# Patient Record
Sex: MALE | Race: WHITE | NOT HISPANIC OR LATINO | Employment: FULL TIME | URBAN - NONMETROPOLITAN AREA
[De-identification: names, ages, dates, MRNs, and addresses within clinical notes are randomized per-mention and may not be internally consistent; named-entity substitution may affect disease eponyms.]

---

## 2018-10-05 ENCOUNTER — HOSPITAL ENCOUNTER (OUTPATIENT)
Dept: ULTRASOUND IMAGING | Facility: HOSPITAL | Age: 48
Discharge: HOME | End: 2018-10-05
Admitting: FAMILY MEDICINE

## 2018-10-05 ENCOUNTER — TRANSCRIBE ORDERS (OUTPATIENT)
Dept: ADMINISTRATIVE | Facility: HOSPITAL | Age: 48
End: 2018-10-05

## 2018-10-05 DIAGNOSIS — R10.11 RUQ PAIN: Primary | ICD-10-CM

## 2018-10-05 DIAGNOSIS — R10.11 RUQ PAIN: ICD-10-CM

## 2018-10-05 PROCEDURE — 76700 US EXAM ABDOM COMPLETE: CPT

## 2019-07-01 ENCOUNTER — HOSPITAL ENCOUNTER (EMERGENCY)
Facility: HOSPITAL | Age: 49
Discharge: HOME | End: 2019-07-01
Attending: EMERGENCY MEDICINE | Admitting: EMERGENCY MEDICINE

## 2019-07-01 ENCOUNTER — APPOINTMENT (OUTPATIENT)
Dept: GENERAL RADIOLOGY | Facility: HOSPITAL | Age: 49
End: 2019-07-01

## 2019-07-01 VITALS
SYSTOLIC BLOOD PRESSURE: 124 MMHG | WEIGHT: 260 LBS | RESPIRATION RATE: 18 BRPM | OXYGEN SATURATION: 95 % | HEIGHT: 74 IN | BODY MASS INDEX: 33.37 KG/M2 | HEART RATE: 54 BPM | DIASTOLIC BLOOD PRESSURE: 81 MMHG | TEMPERATURE: 98.9 F

## 2019-07-01 DIAGNOSIS — R07.9 CHEST PAIN, UNSPECIFIED TYPE: Primary | ICD-10-CM

## 2019-07-01 LAB
ALBUMIN SERPL-MCNC: 4.5 G/DL (ref 3.5–5)
ALBUMIN/GLOB SERPL: 1.6 G/DL (ref 1–2)
ALP SERPL-CCNC: 101 U/L (ref 38–126)
ALT SERPL W P-5'-P-CCNC: 68 U/L (ref 13–69)
ANION GAP SERPL CALCULATED.3IONS-SCNC: 14.6 MMOL/L (ref 10–20)
AST SERPL-CCNC: 38 U/L (ref 15–46)
BASOPHILS # BLD AUTO: 0.06 10*3/MM3 (ref 0–0.2)
BASOPHILS NFR BLD AUTO: 0.7 % (ref 0–1.5)
BILIRUB SERPL-MCNC: 0.6 MG/DL (ref 0.2–1.3)
BUN BLD-MCNC: 9 MG/DL (ref 7–20)
BUN/CREAT SERPL: 9 (ref 6.3–21.9)
CALCIUM SPEC-SCNC: 8.7 MG/DL (ref 8.4–10.2)
CHLORIDE SERPL-SCNC: 99 MMOL/L (ref 98–107)
CO2 SERPL-SCNC: 28 MMOL/L (ref 26–30)
CREAT BLD-MCNC: 1 MG/DL (ref 0.6–1.3)
DEPRECATED RDW RBC AUTO: 42.9 FL (ref 37–54)
EOSINOPHIL # BLD AUTO: 0.17 10*3/MM3 (ref 0–0.4)
EOSINOPHIL NFR BLD AUTO: 1.9 % (ref 0.3–6.2)
ERYTHROCYTE [DISTWIDTH] IN BLOOD BY AUTOMATED COUNT: 12.4 % (ref 12.3–15.4)
GFR SERPL CREATININE-BSD FRML MDRD: 79 ML/MIN/1.73
GLOBULIN UR ELPH-MCNC: 2.8 GM/DL
GLUCOSE BLD-MCNC: 122 MG/DL (ref 74–98)
HCT VFR BLD AUTO: 47.9 % (ref 37.5–51)
HGB BLD-MCNC: 15.7 G/DL (ref 13–17.7)
HOLD SPECIMEN: NORMAL
IMM GRANULOCYTES # BLD AUTO: 0.02 10*3/MM3 (ref 0–0.05)
IMM GRANULOCYTES NFR BLD AUTO: 0.2 % (ref 0–0.5)
LYMPHOCYTES # BLD AUTO: 1.46 10*3/MM3 (ref 0.7–3.1)
LYMPHOCYTES NFR BLD AUTO: 15.9 % (ref 19.6–45.3)
MCH RBC QN AUTO: 31 PG (ref 26.6–33)
MCHC RBC AUTO-ENTMCNC: 32.8 G/DL (ref 31.5–35.7)
MCV RBC AUTO: 94.5 FL (ref 79–97)
MONOCYTES # BLD AUTO: 0.76 10*3/MM3 (ref 0.1–0.9)
MONOCYTES NFR BLD AUTO: 8.3 % (ref 5–12)
NEUTROPHILS # BLD AUTO: 6.69 10*3/MM3 (ref 1.7–7)
NEUTROPHILS NFR BLD AUTO: 73 % (ref 42.7–76)
NRBC BLD AUTO-RTO: 0 /100 WBC (ref 0–0.2)
PLATELET # BLD AUTO: 257 10*3/MM3 (ref 140–450)
PMV BLD AUTO: 9.7 FL (ref 6–12)
POTASSIUM BLD-SCNC: 3.6 MMOL/L (ref 3.5–5.1)
PROT SERPL-MCNC: 7.3 G/DL (ref 6.3–8.2)
RBC # BLD AUTO: 5.07 10*6/MM3 (ref 4.14–5.8)
SODIUM BLD-SCNC: 138 MMOL/L (ref 137–145)
TROPONIN I SERPL-MCNC: 0 NG/ML (ref 0–0.05)
TROPONIN I SERPL-MCNC: <0.012 NG/ML (ref 0–0.03)
TROPONIN I SERPL-MCNC: <0.012 NG/ML (ref 0–0.03)
WBC NRBC COR # BLD: 9.16 10*3/MM3 (ref 3.4–10.8)
WHOLE BLOOD HOLD SPECIMEN: NORMAL
WHOLE BLOOD HOLD SPECIMEN: NORMAL

## 2019-07-01 PROCEDURE — 84484 ASSAY OF TROPONIN QUANT: CPT

## 2019-07-01 PROCEDURE — 84484 ASSAY OF TROPONIN QUANT: CPT | Performed by: EMERGENCY MEDICINE

## 2019-07-01 PROCEDURE — 71045 X-RAY EXAM CHEST 1 VIEW: CPT

## 2019-07-01 PROCEDURE — 99284 EMERGENCY DEPT VISIT MOD MDM: CPT

## 2019-07-01 PROCEDURE — 93005 ELECTROCARDIOGRAM TRACING: CPT

## 2019-07-01 PROCEDURE — 85025 COMPLETE CBC W/AUTO DIFF WBC: CPT

## 2019-07-01 PROCEDURE — 80053 COMPREHEN METABOLIC PANEL: CPT

## 2019-07-01 RX ORDER — SODIUM CHLORIDE 0.9 % (FLUSH) 0.9 %
10 SYRINGE (ML) INJECTION AS NEEDED
Status: DISCONTINUED | OUTPATIENT
Start: 2019-07-01 | End: 2019-07-01 | Stop reason: HOSPADM

## 2019-07-01 RX ORDER — FLUOXETINE HYDROCHLORIDE 40 MG/1
40 CAPSULE ORAL DAILY
COMMUNITY

## 2019-07-01 RX ORDER — ASPIRIN 325 MG
325 TABLET ORAL ONCE
Status: COMPLETED | OUTPATIENT
Start: 2019-07-01 | End: 2019-07-01

## 2019-07-01 RX ORDER — HYDROXYZINE PAMOATE 50 MG/1
50 CAPSULE ORAL 3 TIMES DAILY PRN
Qty: 20 CAPSULE | Refills: 0 | Status: SHIPPED | OUTPATIENT
Start: 2019-07-01

## 2019-07-01 RX ADMIN — ASPIRIN 325 MG ORAL TABLET 325 MG: 325 PILL ORAL at 14:47

## 2019-07-01 NOTE — ED PROVIDER NOTES
"Subjective   49-year-old male presenting with lightheadedness.  He states that 2 hours ago he was at work, began to feel lightheaded, had some mild chest discomfort, tingling in his arms, felt like he may pass out.  He was very stressed at the time.  He feels better at this time.  He denies any nausea, vomiting, shortness of breath.  He notes a history of a \"mild heart attack\", this was about 15 years ago.  10 years ago he had left heart cath that was normal.  He does not smoke.  No family history of coronary disease.  He does note being under a significant amount of stress and thinks that may be the cause of his symptoms.            Review of Systems   Constitutional: Negative.    HENT: Negative.    Eyes: Negative.    Respiratory: Negative.    Cardiovascular: Positive for chest pain. Negative for palpitations.   Gastrointestinal: Negative.    Genitourinary: Negative.    Musculoskeletal: Negative.    Skin: Negative.    Neurological: Positive for light-headedness. Negative for weakness, numbness and headaches.   Psychiatric/Behavioral: Negative.        Past Medical History:   Diagnosis Date   • Myocardial infarction (CMS/[facility])        Allergies   Allergen Reactions   • Codeine Other (See Comments)     headache       History reviewed. No pertinent surgical history.    History reviewed. No pertinent family history.    Social History     Socioeconomic History   • Marital status: Single     Spouse name: Not on file   • Number of children: Not on file   • Years of education: Not on file   • Highest education level: Not on file   Tobacco Use   • Smoking status: Never Smoker   • Smokeless tobacco: Never Used   Substance and Sexual Activity   • Alcohol use: Yes     Comment: occasionally   • Drug use: No           Objective   Physical Exam   Constitutional: He is oriented to person, place, and time. He appears well-developed and well-nourished. No distress.   HENT:   Head: Normocephalic and atraumatic.   Right Ear: External ear " normal.   Left Ear: External ear normal.   Nose: Nose normal.   Mouth/Throat: Oropharynx is clear and moist.   Eyes: Conjunctivae and EOM are normal. Pupils are equal, round, and reactive to light.   Neck: Normal range of motion. Neck supple.   Cardiovascular: Normal rate, regular rhythm, normal heart sounds and intact distal pulses.   Pulmonary/Chest: Effort normal and breath sounds normal. No respiratory distress.   Abdominal: Soft. Bowel sounds are normal. He exhibits no distension. There is no tenderness. There is no rebound and no guarding.   Musculoskeletal: Normal range of motion. He exhibits no edema, tenderness or deformity.   Neurological: He is alert and oriented to person, place, and time.   Skin: Skin is warm and dry. No rash noted.   Psychiatric: He has a normal mood and affect. His behavior is normal.   Nursing note and vitals reviewed.      Procedures           ED Course                  MDM  Number of Diagnoses or Management Options  Chest pain, unspecified type:   Diagnosis management comments: 49-year-old male with lightheadedness, chest pain.  Well-developed, well-nourished man in no distress with exam as above.  He is normal vital signs and a largely unremarkable exam.  Will check labs, EKG and chest x-ray.  Will give aspirin.  Disposition pending work-up.    DDX: Anxiety, ACS, musculoskeletal pain    EKG interpreted by me: Sinus rhythm, normal rate, no acute ST/T changes, poor R wave progression, this is an abnormal EKG, no previous for comparison    Lab work is unremarkable, serial enzymes are negative.  Will discharge home with outpatient follow-up.       Amount and/or Complexity of Data Reviewed  Clinical lab tests: reviewed  Tests in the radiology section of CPT®: reviewed          Final diagnoses:   Chest pain, unspecified type            Marcos Pollack MD  07/01/19 2254

## 2019-08-07 ENCOUNTER — TELEPHONE (OUTPATIENT)
Dept: SURGERY | Facility: CLINIC | Age: 49
End: 2019-08-07

## 2019-08-07 NOTE — TELEPHONE ENCOUNTER
PRESCREENING FOR OPEN ACCESS SCHEDULING    Yamil Solitario, 1970  [id number]    08/07/19    If, the patient answers yes to any of the following questions the provider will be informed prior to scheduling open access for approval and documented in the chart.    [x]  Yes  [x] No    1. Have you ever had a colonoscopy in the past?      When:        Where:       Polyps or other:     [x]  Yes  [] No    2. Family history of colon cancer?      Relation: UNCLE    Age of onset:       Do you currently have any of the following?    []  Yes  [x] No  Rectal bleeding, if so, how long?     []  Yes  [x] No  Abdominal pain, if so, how long?    []  Yes  [x] No  Constipation, if so, how long?    []  Yes  [x] No  Diarrhea, if so, how long?    []  Yes  [x] No  Weight loss, is so, how much?    [] Yes  [x] No  Small caliber stool, if so, how long?      Have you ever had any of the following conditions?    [] Yes  [x] No  Heart attack?      When?       Last cardiac workup?     Blood thinners?    [] Yes  [x] No   Lung problems, asthma or COPD?  [] Yes  [x] No  Oxygen required?       [] Yes  [x] No  Stroke?     [] Yes  [x] No  Have you ever had a reaction to anesthesia?      BH 09/13/19  COLONOSCOPY

## 2019-08-16 ENCOUNTER — PREP FOR SURGERY (OUTPATIENT)
Dept: OTHER | Facility: HOSPITAL | Age: 49
End: 2019-08-16

## 2019-08-16 DIAGNOSIS — Z12.11 COLON CANCER SCREENING: Primary | ICD-10-CM

## 2019-08-20 PROBLEM — Z12.11 COLON CANCER SCREENING: Status: ACTIVE | Noted: 2019-08-20

## 2019-09-03 ENCOUNTER — TELEPHONE (OUTPATIENT)
Dept: SURGERY | Facility: CLINIC | Age: 49
End: 2019-09-03

## 2019-11-07 ENCOUNTER — TELEPHONE (OUTPATIENT)
Dept: SURGERY | Facility: CLINIC | Age: 49
End: 2019-11-07

## 2019-11-07 NOTE — TELEPHONE ENCOUNTER
Patient called saying he needs to cancel his colonoscopy that is scheduled 11/15/19 with Dr Gupta. Stated he will call back and reschedule at a later date.

## 2022-02-11 ENCOUNTER — APPOINTMENT (OUTPATIENT)
Dept: GENERAL RADIOLOGY | Facility: HOSPITAL | Age: 52
End: 2022-02-11

## 2022-02-11 ENCOUNTER — HOSPITAL ENCOUNTER (EMERGENCY)
Facility: HOSPITAL | Age: 52
Discharge: HOME | End: 2022-02-11
Attending: EMERGENCY MEDICINE | Admitting: EMERGENCY MEDICINE

## 2022-02-11 VITALS
OXYGEN SATURATION: 98 % | HEIGHT: 74 IN | BODY MASS INDEX: 35.29 KG/M2 | RESPIRATION RATE: 16 BRPM | DIASTOLIC BLOOD PRESSURE: 76 MMHG | SYSTOLIC BLOOD PRESSURE: 133 MMHG | HEART RATE: 60 BPM | WEIGHT: 275 LBS | TEMPERATURE: 98 F

## 2022-02-11 DIAGNOSIS — Z86.79 HISTORY OF HYPERTENSION: ICD-10-CM

## 2022-02-11 DIAGNOSIS — Z86.39 HISTORY OF DIABETES MELLITUS, TYPE II: ICD-10-CM

## 2022-02-11 DIAGNOSIS — R07.9 CHEST PAIN, UNSPECIFIED TYPE: Primary | ICD-10-CM

## 2022-02-11 LAB
ALBUMIN SERPL-MCNC: 4.3 G/DL (ref 3.5–5.2)
ALBUMIN/GLOB SERPL: 1.8 G/DL
ALP SERPL-CCNC: 89 U/L (ref 39–117)
ALT SERPL W P-5'-P-CCNC: 56 U/L (ref 1–41)
ANION GAP SERPL CALCULATED.3IONS-SCNC: 11.4 MMOL/L (ref 5–15)
AST SERPL-CCNC: 40 U/L (ref 1–40)
BASOPHILS # BLD AUTO: 0.03 10*3/MM3 (ref 0–0.2)
BASOPHILS NFR BLD AUTO: 0.3 % (ref 0–1.5)
BILIRUB SERPL-MCNC: 0.3 MG/DL (ref 0–1.2)
BUN SERPL-MCNC: 8 MG/DL (ref 6–20)
BUN/CREAT SERPL: 7 (ref 7–25)
CALCIUM SPEC-SCNC: 8.9 MG/DL (ref 8.6–10.5)
CHLORIDE SERPL-SCNC: 103 MMOL/L (ref 98–107)
CO2 SERPL-SCNC: 23.6 MMOL/L (ref 22–29)
CREAT SERPL-MCNC: 1.15 MG/DL (ref 0.76–1.27)
D DIMER PPP FEU-MCNC: 0.2 MCGFEU/ML (ref 0–0.57)
DEPRECATED RDW RBC AUTO: 44.7 FL (ref 37–54)
EOSINOPHIL # BLD AUTO: 0.1 10*3/MM3 (ref 0–0.4)
EOSINOPHIL NFR BLD AUTO: 1.1 % (ref 0.3–6.2)
ERYTHROCYTE [DISTWIDTH] IN BLOOD BY AUTOMATED COUNT: 13.2 % (ref 12.3–15.4)
GFR SERPL CREATININE-BSD FRML MDRD: 67 ML/MIN/1.73
GLOBULIN UR ELPH-MCNC: 2.4 GM/DL
GLUCOSE SERPL-MCNC: 125 MG/DL (ref 65–99)
HCT VFR BLD AUTO: 44.9 % (ref 37.5–51)
HGB BLD-MCNC: 15.1 G/DL (ref 13–17.7)
HOLD SPECIMEN: NORMAL
HOLD SPECIMEN: NORMAL
IMM GRANULOCYTES # BLD AUTO: 0.03 10*3/MM3 (ref 0–0.05)
IMM GRANULOCYTES NFR BLD AUTO: 0.3 % (ref 0–0.5)
LYMPHOCYTES # BLD AUTO: 1.63 10*3/MM3 (ref 0.7–3.1)
LYMPHOCYTES NFR BLD AUTO: 18.5 % (ref 19.6–45.3)
MCH RBC QN AUTO: 31.3 PG (ref 26.6–33)
MCHC RBC AUTO-ENTMCNC: 33.6 G/DL (ref 31.5–35.7)
MCV RBC AUTO: 93.2 FL (ref 79–97)
MONOCYTES # BLD AUTO: 0.73 10*3/MM3 (ref 0.1–0.9)
MONOCYTES NFR BLD AUTO: 8.3 % (ref 5–12)
NEUTROPHILS NFR BLD AUTO: 6.3 10*3/MM3 (ref 1.7–7)
NEUTROPHILS NFR BLD AUTO: 71.5 % (ref 42.7–76)
NRBC BLD AUTO-RTO: 0 /100 WBC (ref 0–0.2)
NT-PROBNP SERPL-MCNC: 20.6 PG/ML (ref 0–900)
PLATELET # BLD AUTO: 210 10*3/MM3 (ref 140–450)
PMV BLD AUTO: 10 FL (ref 6–12)
POTASSIUM SERPL-SCNC: 3.7 MMOL/L (ref 3.5–5.2)
PROT SERPL-MCNC: 6.7 G/DL (ref 6–8.5)
RBC # BLD AUTO: 4.82 10*6/MM3 (ref 4.14–5.8)
SODIUM SERPL-SCNC: 138 MMOL/L (ref 136–145)
TROPONIN T SERPL-MCNC: <0.01 NG/ML (ref 0–0.03)
TROPONIN T SERPL-MCNC: <0.01 NG/ML (ref 0–0.03)
WBC NRBC COR # BLD: 8.82 10*3/MM3 (ref 3.4–10.8)
WHOLE BLOOD HOLD SPECIMEN: NORMAL
WHOLE BLOOD HOLD SPECIMEN: NORMAL

## 2022-02-11 PROCEDURE — 83880 ASSAY OF NATRIURETIC PEPTIDE: CPT | Performed by: NURSE PRACTITIONER

## 2022-02-11 PROCEDURE — 84484 ASSAY OF TROPONIN QUANT: CPT | Performed by: NURSE PRACTITIONER

## 2022-02-11 PROCEDURE — 93005 ELECTROCARDIOGRAM TRACING: CPT | Performed by: EMERGENCY MEDICINE

## 2022-02-11 PROCEDURE — 71045 X-RAY EXAM CHEST 1 VIEW: CPT

## 2022-02-11 PROCEDURE — 99283 EMERGENCY DEPT VISIT LOW MDM: CPT

## 2022-02-11 PROCEDURE — 36415 COLL VENOUS BLD VENIPUNCTURE: CPT

## 2022-02-11 PROCEDURE — 85025 COMPLETE CBC W/AUTO DIFF WBC: CPT | Performed by: EMERGENCY MEDICINE

## 2022-02-11 PROCEDURE — 84484 ASSAY OF TROPONIN QUANT: CPT | Performed by: EMERGENCY MEDICINE

## 2022-02-11 PROCEDURE — 80053 COMPREHEN METABOLIC PANEL: CPT | Performed by: EMERGENCY MEDICINE

## 2022-02-11 PROCEDURE — 85379 FIBRIN DEGRADATION QUANT: CPT | Performed by: NURSE PRACTITIONER

## 2022-02-11 RX ORDER — SODIUM CHLORIDE 0.9 % (FLUSH) 0.9 %
10 SYRINGE (ML) INJECTION AS NEEDED
Status: DISCONTINUED | OUTPATIENT
Start: 2022-02-11 | End: 2022-02-11 | Stop reason: HOSPADM

## 2022-02-11 NOTE — ED PROVIDER NOTES
EMERGENCY DEPARTMENT ENCOUNTER    Pt Name: Dwayne Denver Short  MRN: [id number]  Pt :   1970  Room Number:    Date of encounter:  2022  PCP: Taniya Moise DO  ED Provider: AGUSTIN Manning    Historian: patient    HPI:  Chief Complaint: chest pain        Context: Dwayne Denver Short is a 51 y.o. male who presents to the ED c/o chest pain intermittently for 1 week and today, was associated with a brief pain in his right jaw.  Mr. Colunga states that he had a myocardial infarction 15 years ago.  He had a heart cath that was normal, to his understanding, about 8 years ago.  Today's pain is currently absent.  He states his highest pain today has been rated a 3 out of 10.  He experienced a moment of sweating without shortness of breath or nausea or syncope.  He can recount no variables that made his pain better nor worse.  His pain is not reproduced with cough or deep breathing.    He has a past medical history of hypertension without history of hyperlipidemia.  He is a type II diabetic who is a non-smoker.  No history of CVA.    Review of systems is negative for fever or chills or recent illness.  Negative for cough or shortness of breath or exertional dyspnea.  GI and  systems are negative.  No profound weakness, dizziness or syncope.  No neurosensory complaints or focal weakness      PAST MEDICAL HISTORY  Past Medical History:   Diagnosis Date   • Myocardial infarction (CMS/HCC)          PAST SURGICAL HISTORY  No past surgical history on file.      FAMILY HISTORY  No family history on file.      SOCIAL HISTORY  Social History     Socioeconomic History   • Marital status: Single   Tobacco Use   • Smoking status: Never Smoker   • Smokeless tobacco: Never Used   Substance and Sexual Activity   • Alcohol use: Yes     Comment: occasionally   • Drug use: No         ALLERGIES  Codeine        REVIEW OF SYSTEMS  Review of Systems     All systems reviewed and negative except for those discussed  Family notified: Dr. Mena to update pt's family in room.   in HPI.       PHYSICAL EXAM    I have reviewed the triage vital signs and nursing notes.    ED Triage Vitals [02/11/22 1515]   Temp Heart Rate Resp BP SpO2   98 °F (36.7 °C) 75 20 148/79 97 %      Temp src Heart Rate Source Patient Position BP Location FiO2 (%)   Oral Monitor Sitting Left arm --       Physical Exam  GENERAL:   Appears in no acute distress.  He is chest pain-free.  His vital signs are normal  HENT: Nares patent.  EYES: No scleral icterus.  CV: Regular rhythm, regular rate.  No murmur.  No peripheral edema.  RESPIRATORY: Normal effort.  No audible wheezes, rales or rhonchi.  ABDOMEN: Soft, nontender  MUSCULOSKELETAL: No deformities.   NEURO: Alert, moves all extremities, follows commands.  SKIN: Warm, dry, no rash visualized.        LAB RESULTS  No results found for this or any previous visit (from the past 24 hour(s)).    If labs were ordered, I independently reviewed the results.        RADIOLOGY  No Radiology Exams Resulted Within Past 24 Hours        PROCEDURES    Procedures    ECG 12 Lead    (Results Pending)       MEDICATIONS GIVEN IN ER    Medications - No data to display        ED Course as of 02/21/22 0739   Fri Feb 11, 2022   1536 EKG interpreted by me: Sinus rhythm, normal rate, right bundle branch block, no acute ST/T changes, this is an abnormal EKG [MP]   0808 I have conferred with Dr. Sood.  Patient has a heart score of 5.  He has had no chest pain throughout his ED course.  His vital signs have been normal.  He has had 2 normal troponin and normal D-dimer.  Dr. Sood requests him call the office on Monday for follow-up next week.  Mr. Solitario and I discussed in detail parameters for concern that would warrant return to the emergency department.  Patient understands and concurs with his outpatient plan of care and close follow-up.   [MS]      ED Course User Index  [MP] Marcos Pollack MD  [MS] Valentine Mullen, AGUSTIN             AS OF 07:39 EST VITALS:    BP - 133/76  HR -  60  TEMP - 98 °F (36.7 °C) (Oral)  O2 SATS - 98%                  DIAGNOSIS  Final diagnoses:   Chest pain, unspecified type   History of hypertension   History of diabetes mellitus, type II         DISPOSITION    DISCHARGE    Patient discharged in stable condition.    Reviewed implications of results, diagnosis, meds, responsibility to follow up, warning signs and symptoms of possible worsening, potential complications and reasons to return to ER.    Patient/Family voiced understanding of above instructions.    Discussed plan for discharge, as there is no emergent indication for admission.  Pt/family is agreeable and understands need for follow up and possible repeat testing.  Pt/family is aware that discharge does not mean that nothing is wrong but that it indicates no emergency is currently present that requires admission and they must continue care with follow-up as given below or with a physician of their choice.     FOLLOW-UP  Kyle Sood MD  [address] [address] [phone]               Medication List      No changes were made to your prescriptions during this visit.                  Valentine Mullen APRN  02/11/22 1949       Valentine Mullen APRN  02/21/22 0721

## 2022-02-11 NOTE — DISCHARGE INSTRUCTIONS
Home to rest.  Maintain your very best hydration and nutrition.  Continue daily aspirin.  Call the office of Dr. Sood on Monday.  He is expecting you and wants to see you next week.  In the meantime, return to the emergency department as needed for worsening symptoms or concerns.  Thank you

## 2022-07-23 ENCOUNTER — HOSPITAL ENCOUNTER (EMERGENCY)
Facility: HOSPITAL | Age: 52
Discharge: HOME | End: 2022-07-24
Attending: EMERGENCY MEDICINE | Admitting: EMERGENCY MEDICINE

## 2022-07-23 ENCOUNTER — APPOINTMENT (OUTPATIENT)
Dept: CT IMAGING | Facility: HOSPITAL | Age: 52
End: 2022-07-23

## 2022-07-23 VITALS
WEIGHT: 278.9 LBS | BODY MASS INDEX: 35.79 KG/M2 | TEMPERATURE: 99 F | RESPIRATION RATE: 18 BRPM | HEART RATE: 90 BPM | DIASTOLIC BLOOD PRESSURE: 78 MMHG | HEIGHT: 74 IN | SYSTOLIC BLOOD PRESSURE: 138 MMHG | OXYGEN SATURATION: 96 %

## 2022-07-23 DIAGNOSIS — Z12.11 COLON CANCER SCREENING: Primary | ICD-10-CM

## 2022-07-23 LAB
ALBUMIN SERPL-MCNC: 4.3 G/DL (ref 3.5–5.2)
ALBUMIN/GLOB SERPL: 2 G/DL
ALP SERPL-CCNC: 76 U/L (ref 39–117)
ALT SERPL W P-5'-P-CCNC: 36 U/L (ref 1–41)
ANION GAP SERPL CALCULATED.3IONS-SCNC: 12.5 MMOL/L (ref 5–15)
AST SERPL-CCNC: 18 U/L (ref 1–40)
BACTERIA UR QL AUTO: ABNORMAL /HPF
BASOPHILS # BLD AUTO: 0.05 10*3/MM3 (ref 0–0.2)
BASOPHILS NFR BLD AUTO: 0.4 % (ref 0–1.5)
BILIRUB SERPL-MCNC: 0.5 MG/DL (ref 0–1.2)
BILIRUB UR QL STRIP: NEGATIVE
BUN SERPL-MCNC: 9 MG/DL (ref 6–20)
BUN/CREAT SERPL: 8.5 (ref 7–25)
CALCIUM SPEC-SCNC: 9.2 MG/DL (ref 8.6–10.5)
CHLORIDE SERPL-SCNC: 99 MMOL/L (ref 98–107)
CLARITY UR: CLEAR
CO2 SERPL-SCNC: 24.5 MMOL/L (ref 22–29)
COLOR UR: YELLOW
CREAT SERPL-MCNC: 1.06 MG/DL (ref 0.76–1.27)
DEPRECATED RDW RBC AUTO: 40.4 FL (ref 37–54)
EGFRCR SERPLBLD CKD-EPI 2021: 83.9 ML/MIN/1.73
EOSINOPHIL # BLD AUTO: 0.13 10*3/MM3 (ref 0–0.4)
EOSINOPHIL NFR BLD AUTO: 0.9 % (ref 0.3–6.2)
ERYTHROCYTE [DISTWIDTH] IN BLOOD BY AUTOMATED COUNT: 12.3 % (ref 12.3–15.4)
GLOBULIN UR ELPH-MCNC: 2.1 GM/DL
GLUCOSE SERPL-MCNC: 155 MG/DL (ref 65–99)
GLUCOSE UR STRIP-MCNC: NEGATIVE MG/DL
HCT VFR BLD AUTO: 45.8 % (ref 37.5–51)
HGB BLD-MCNC: 15.7 G/DL (ref 13–17.7)
HGB UR QL STRIP.AUTO: NEGATIVE
HOLD SPECIMEN: NORMAL
HOLD SPECIMEN: NORMAL
HYALINE CASTS UR QL AUTO: ABNORMAL /LPF
IMM GRANULOCYTES # BLD AUTO: 0.04 10*3/MM3 (ref 0–0.05)
IMM GRANULOCYTES NFR BLD AUTO: 0.3 % (ref 0–0.5)
KETONES UR QL STRIP: NEGATIVE
LEUKOCYTE ESTERASE UR QL STRIP.AUTO: ABNORMAL
LIPASE SERPL-CCNC: 14 U/L (ref 13–60)
LYMPHOCYTES # BLD AUTO: 2.02 10*3/MM3 (ref 0.7–3.1)
LYMPHOCYTES NFR BLD AUTO: 14.7 % (ref 19.6–45.3)
MCH RBC QN AUTO: 30.9 PG (ref 26.6–33)
MCHC RBC AUTO-ENTMCNC: 34.3 G/DL (ref 31.5–35.7)
MCV RBC AUTO: 90.2 FL (ref 79–97)
MONOCYTES # BLD AUTO: 1.13 10*3/MM3 (ref 0.1–0.9)
MONOCYTES NFR BLD AUTO: 8.2 % (ref 5–12)
MUCOUS THREADS URNS QL MICRO: ABNORMAL /HPF
NEUTROPHILS NFR BLD AUTO: 10.36 10*3/MM3 (ref 1.7–7)
NEUTROPHILS NFR BLD AUTO: 75.5 % (ref 42.7–76)
NITRITE UR QL STRIP: NEGATIVE
NRBC BLD AUTO-RTO: 0 /100 WBC (ref 0–0.2)
PH UR STRIP.AUTO: 5.5 [PH] (ref 5–8)
PLATELET # BLD AUTO: 226 10*3/MM3 (ref 140–450)
PMV BLD AUTO: 9.8 FL (ref 6–12)
POTASSIUM SERPL-SCNC: 3.8 MMOL/L (ref 3.5–5.2)
PROT SERPL-MCNC: 6.4 G/DL (ref 6–8.5)
PROT UR QL STRIP: NEGATIVE
RBC # BLD AUTO: 5.08 10*6/MM3 (ref 4.14–5.8)
RBC # UR STRIP: ABNORMAL /HPF
REF LAB TEST METHOD: ABNORMAL
SODIUM SERPL-SCNC: 136 MMOL/L (ref 136–145)
SP GR UR STRIP: 1.01 (ref 1–1.03)
SQUAMOUS #/AREA URNS HPF: ABNORMAL /HPF
UROBILINOGEN UR QL STRIP: ABNORMAL
WBC # UR STRIP: ABNORMAL /HPF
WBC NRBC COR # BLD: 13.73 10*3/MM3 (ref 3.4–10.8)
WHOLE BLOOD HOLD COAG: NORMAL
WHOLE BLOOD HOLD SPECIMEN: NORMAL

## 2022-07-23 PROCEDURE — 80053 COMPREHEN METABOLIC PANEL: CPT | Performed by: EMERGENCY MEDICINE

## 2022-07-23 PROCEDURE — 83690 ASSAY OF LIPASE: CPT | Performed by: EMERGENCY MEDICINE

## 2022-07-23 PROCEDURE — 85025 COMPLETE CBC W/AUTO DIFF WBC: CPT | Performed by: EMERGENCY MEDICINE

## 2022-07-23 PROCEDURE — 74176 CT ABD & PELVIS W/O CONTRAST: CPT

## 2022-07-23 PROCEDURE — 81001 URINALYSIS AUTO W/SCOPE: CPT | Performed by: EMERGENCY MEDICINE

## 2022-07-23 PROCEDURE — 99283 EMERGENCY DEPT VISIT LOW MDM: CPT

## 2022-07-23 RX ORDER — SODIUM CHLORIDE 0.9 % (FLUSH) 0.9 %
10 SYRINGE (ML) INJECTION AS NEEDED
Status: DISCONTINUED | OUTPATIENT
Start: 2022-07-23 | End: 2022-07-24 | Stop reason: HOSPADM

## 2022-07-24 PROCEDURE — 25010000002 KETOROLAC TROMETHAMINE PER 15 MG: Performed by: EMERGENCY MEDICINE

## 2022-07-24 PROCEDURE — 96374 THER/PROPH/DIAG INJ IV PUSH: CPT

## 2022-07-24 RX ORDER — AMOXICILLIN AND CLAVULANATE POTASSIUM 875; 125 MG/1; MG/1
1 TABLET, FILM COATED ORAL ONCE
Status: COMPLETED | OUTPATIENT
Start: 2022-07-24 | End: 2022-07-24

## 2022-07-24 RX ORDER — TRAMADOL HYDROCHLORIDE 50 MG/1
50 TABLET ORAL EVERY 6 HOURS PRN
Qty: 12 TABLET | Refills: 0 | Status: SHIPPED | OUTPATIENT
Start: 2022-07-24

## 2022-07-24 RX ORDER — AMOXICILLIN AND CLAVULANATE POTASSIUM 875; 125 MG/1; MG/1
1 TABLET, FILM COATED ORAL 2 TIMES DAILY
Qty: 14 TABLET | Refills: 0 | Status: SHIPPED | OUTPATIENT
Start: 2022-07-24

## 2022-07-24 RX ORDER — DICYCLOMINE HYDROCHLORIDE 10 MG/1
20 CAPSULE ORAL ONCE
Status: COMPLETED | OUTPATIENT
Start: 2022-07-24 | End: 2022-07-24

## 2022-07-24 RX ORDER — KETOROLAC TROMETHAMINE 30 MG/ML
30 INJECTION, SOLUTION INTRAMUSCULAR; INTRAVENOUS ONCE
Status: COMPLETED | OUTPATIENT
Start: 2022-07-24 | End: 2022-07-24

## 2022-07-24 RX ADMIN — KETOROLAC TROMETHAMINE 30 MG: 30 INJECTION, SOLUTION INTRAMUSCULAR; INTRAVENOUS at 00:26

## 2022-07-24 RX ADMIN — DICYCLOMINE HYDROCHLORIDE 20 MG: 10 CAPSULE ORAL at 00:26

## 2022-07-24 RX ADMIN — AMOXICILLIN AND CLAVULANATE POTASSIUM 1 TABLET: 875; 125 TABLET, FILM COATED ORAL at 00:27

## 2022-07-24 NOTE — ED PROVIDER NOTES
"Subjective   History of Present Illness    Chief Complaint: Abdominal pain  History of Present Illness: 52-year-old male presents with left lower quadrant abdominal pain radiating into his groin.  And gradually over the last day or 2.  Worse tonight.  No history of same.  No GI bleeding no mucus in the stools, no diarrhea no travel  Onset: See above timeline  Duration: Persistent  Exacerbating / Alleviating factors: None  Associated symptoms: None      Nurses Notes reviewed and agree, including vitals, allergies, social history and prior medical history.     REVIEW OF SYSTEMS: All systems reviewed and not pertinent unless noted.    Positive for: Left lower quadrant abdominal pain    Negative for: Fever chills vomiting diarrhea GI bleeding constipation hematuria  Review of Systems    Past Medical History:   Diagnosis Date   • Myocardial infarction (HCC)        Allergies   Allergen Reactions   • Codeine Other (See Comments)     headache       History reviewed. No pertinent surgical history.    History reviewed. No pertinent family history.    Social History     Socioeconomic History   • Marital status: Single   Tobacco Use   • Smoking status: Never Smoker   • Smokeless tobacco: Never Used   Substance and Sexual Activity   • Alcohol use: Yes     Comment: occasionally   • Drug use: No           Objective   Physical Exam  /78   Pulse 90   Temp 99 °F (37.2 °C) (Oral)   Resp 18   Ht 188 cm (74\")   Wt 127 kg (278 lb 14.4 oz)   SpO2 96%   BMI 35.81 kg/m²     CONSTITUTIONAL: Well developed, nontoxic healthy-appearing 52-year-old  male,  in no acute distress.  VITAL SIGNS: per nursing, reviewed and noted  SKIN: exposed skin with no rashes, ulcerations or petechiae  EYES: Grossly EOMI, no icterus  ENT: Normal voice.  Patient maintained wearing a mask throughout patient encounter due to coronavirus pandemic  RESPIRATORY:  No increased work of breathing. No retractions.   CARDIOVASCULAR:  regular rate and " rhythm, no murmurs.  Good Peripheral pulses. Good cap refill to extremities.   GI: Abdomen soft, reproducible left lower quadrants palpation without rebound tenderness or guarding, normal bowel sounds. No hernia. No ascites.  MUSCULOSKELETAL:  No tenderness. Full ROM. Strength and tone grossly normal.  no spasms. no neck or back tenderness or spasm.   NEUROLOGIC: Alert, oriented x 3. No gross deficits. GCS 15.   PSYCH: appropriate affect.  : no bladder tenderness or distention, no CVA tenderness      Procedures     No attending physician procedures were performed on this patient.    ED Course      Lab Results (last 24 hours)     Procedure Component Value Units Date/Time    CBC & Differential [[id number]]  (Abnormal) Collected: 07/23/22 2144    Specimen: Blood Updated: 07/23/22 2150    Narrative:      The following orders were created for panel order CBC & Differential.  Procedure                               Abnormality         Status                     ---------                               -----------         ------                     CBC Auto Differential[[id number]]        Abnormal            Final result                 Please view results for these tests on the individual orders.    Comprehensive Metabolic Panel [[id number]]  (Abnormal) Collected: 07/23/22 2144    Specimen: Blood Updated: 07/23/22 2205     Glucose 155 mg/dL      BUN 9 mg/dL      Creatinine 1.06 mg/dL      Sodium 136 mmol/L      Potassium 3.8 mmol/L      Chloride 99 mmol/L      CO2 24.5 mmol/L      Calcium 9.2 mg/dL      Total Protein 6.4 g/dL      Albumin 4.30 g/dL      ALT (SGPT) 36 U/L      AST (SGOT) 18 U/L      Alkaline Phosphatase 76 U/L      Total Bilirubin 0.5 mg/dL      Globulin 2.1 gm/dL      A/G Ratio 2.0 g/dL      BUN/Creatinine Ratio 8.5     Anion Gap 12.5 mmol/L      eGFR 83.9 mL/min/1.73      Comment: National Kidney Foundation and American Society of Nephrology (ASN) Task Force recommended calculation based on the Chronic  Kidney Disease Epidemiology Collaboration (CKD-EPI) equation refit without adjustment for race.       Narrative:      GFR Normal >60  Chronic Kidney Disease <60  Kidney Failure <15      Lipase [[id number]]  (Normal) Collected: 07/23/22 2144    Specimen: Blood Updated: 07/23/22 2205     Lipase 14 U/L     CBC Auto Differential [[id number]]  (Abnormal) Collected: 07/23/22 2144    Specimen: Blood Updated: 07/23/22 2150     WBC 13.73 10*3/mm3      RBC 5.08 10*6/mm3      Hemoglobin 15.7 g/dL      Hematocrit 45.8 %      MCV 90.2 fL      MCH 30.9 pg      MCHC 34.3 g/dL      RDW 12.3 %      RDW-SD 40.4 fl      MPV 9.8 fL      Platelets 226 10*3/mm3      Neutrophil % 75.5 %      Lymphocyte % 14.7 %      Monocyte % 8.2 %      Eosinophil % 0.9 %      Basophil % 0.4 %      Immature Grans % 0.3 %      Neutrophils, Absolute 10.36 10*3/mm3      Lymphocytes, Absolute 2.02 10*3/mm3      Monocytes, Absolute 1.13 10*3/mm3      Eosinophils, Absolute 0.13 10*3/mm3      Basophils, Absolute 0.05 10*3/mm3      Immature Grans, Absolute 0.04 10*3/mm3      nRBC 0.0 /100 WBC     Urinalysis With Microscopic If Indicated (No Culture) - Urine, Clean Catch [[id number]]  (Abnormal) Collected: 07/23/22 2145    Specimen: Urine, Clean Catch Updated: 07/23/22 2203     Color, UA Yellow     Appearance, UA Clear     pH, UA 5.5     Specific Gravity, UA 1.012     Glucose, UA Negative     Ketones, UA Negative     Bilirubin, UA Negative     Blood, UA Negative     Protein, UA Negative     Leuk Esterase, UA Trace     Nitrite, UA Negative     Urobilinogen, UA 0.2 E.U./dL    Urinalysis, Microscopic Only - Urine, Clean Catch [[id number]]  (Abnormal) Collected: 07/23/22 2145    Specimen: Urine, Clean Catch Updated: 07/23/22 2203     RBC, UA None Seen /HPF      WBC, UA 0-2 /HPF      Bacteria, UA Trace /HPF      Squamous Epithelial Cells, UA 0-2 /HPF      Hyaline Casts, UA None Seen /LPF      Mucus, UA Trace /HPF      Methodology Manual Light Microscopy        CT Abdomen  Pelvis Without Contrast    Result Date: 7/23/2022  FINAL REPORT TECHNIQUE: Axial CT images were performed from the lung bases through the symphysis pubis without IV contrast.  This study was performed with techniques to keep radiation doses as low as reasonably achievable (ALARA). Individualized dose reduction techniques using automated exposure control or adjustment of mA and/or kV according to the patient's size were employed. CLINICAL HISTORY: LLQ pain FINDINGS: Lack of intravenous contrast limits evaluation of solid abdominal and pelvic organs.  LOWER CHEST: The heart is normal size.  The lung bases are clear.  ABDOMEN/PELVIS:  Liver, gallbladder and bile ducts: There is hepatic steatosis.  The gallbladder is unremarkable.  There is no definite biliary duct dilatation.  Adrenal glands: The adrenal glands are morphologically unremarkable without suspicious lesion. Kidneys, ureter and urinary bladder: No nephrolithiasis.  No hydronephrosis.  Urinary bladder is unremarkable.  Spleen: The spleen is normal size.  Pancreas: The pancreas is grossly unremarkable.  GI systems and mesentery: No evidence of bowel obstruction.  The appendix is visualized and unremarkable in appearance.  No significant mesenteric inflammation.  There is colonic diverticulosis with focal wall thickening and pericolonic inflammation involving the sigmoid colon.  Lymph nodes: No definite pathologically enlarged abdominal or pelvic lymph nodes present within the limits of this noncontrast enhanced exam.  Vessels: The abdominal aorta is normal in caliber.  The inferior vena cava is unremarkable.  Peritoneum: No free intraperitoneal fluid or pneumoperitoneum.  Pelvic viscera: No acute findings.  Body wall: No body wall contusion. Bones: No acute fracture.     Impression: Findings are compatible with acute uncomplicated sigmoid diverticulitis. Authenticated and Electronically Signed by Mateus Joseph MD on 07/23/2022 10:52:59 PM                                  ALBA reviewed by Tee Peoples,        MDM  52-year-old male presented with left lower quadrant pain.  Work-up consistent with acute uncomplicated sigmoid diverticulitis per CT results.  Hemodynamically stable.  Nontoxic.  Will discharge with Augmentin, tramadol, advised dietary restrictions, supportive care, outpatient follow return precaution discussed  Final diagnoses:   Colon cancer screening       ED Disposition  ED Disposition     ED Disposition   Discharge    Condition   Stable    Comment   --             Taniya Moise DO  [address] [facility] [id number] [phone]          [facility] Emergency Department  [address] [facility] [address] [phone]    As needed, If symptoms worsen         Medication List      New Prescriptions    amoxicillin-clavulanate 875-125 MG per tablet  Commonly known as: AUGMENTIN  Take 1 tablet by mouth 2 (Two) Times a Day.     traMADol 50 MG tablet  Commonly known as: ULTRAM  Take 1 tablet by mouth Every 6 (Six) Hours As Needed for Severe Pain .           Where to Get Your Medications      These medications were sent to [facility] [address] [place] [address] - [address] [facility] - [phone]  - [phone]   [address] [facility] [address]    Phone: [phone]   · amoxicillin-clavulanate 875-125 MG per tablet  · traMADol 50 MG tablet          Tee Peoples DO  07/24/22 0022

## 2022-11-28 NOTE — PROGRESS NOTES
Patient: Dwayne Denver Short    YOB: 1970    Date: 11/30/2022    Primary Care Provider: Taniya Moise DO    Chief Complaint   Patient presents with   • Colonoscopy     evaluation       SUBJECTIVE:    History of present illness:  I saw the patient in the office today as a consultation for evaluation and treatment of abdominal pain, diarrhea, and bloating.  He went to the ER in July and had a ct scan that showed acute uncomplicated sigmoid diverticulitis.  He has recently seen his PCP and has been treated with Flagyl and Cipro.  He states that he has ongoing digestive discomfort.  Patient developed a second episode recently, placed on antibiotics again.  He has been on probiotic Greek yogurt daily, has considerable diarrhea and bloating upper abdominal pain radiating to shoulder.    The following portions of the patient's history were reviewed and updated as appropriate: allergies, current medications, past family history, past medical history, past social history, past surgical history and problem list.    Review of Systems   Constitutional: Negative for appetite change, chills, diaphoresis, fatigue, fever and unexpected weight change.   HENT: Negative for congestion, dental problem, drooling, ear discharge, hearing loss, nosebleeds, postnasal drip, trouble swallowing and voice change.    Eyes: Negative for photophobia and visual disturbance.   Respiratory: Negative for apnea, cough, choking, chest tightness, shortness of breath and wheezing.    Cardiovascular: Negative for chest pain, palpitations and leg swelling.   Gastrointestinal: Positive for abdominal distention, abdominal pain and diarrhea. Negative for anal bleeding, blood in stool, constipation, nausea, rectal pain and vomiting.   Endocrine: Negative for cold intolerance and heat intolerance.   Genitourinary: Negative for difficulty urinating, dysuria, flank pain, frequency, hematuria, scrotal swelling and testicular pain.    Musculoskeletal: Negative for arthralgias, back pain, gait problem and joint swelling.   Skin: Negative for color change, rash and wound.   Neurological: Negative for dizziness, seizures, syncope, speech difficulty, weakness, light-headedness, numbness and headaches.   Hematological: Negative for adenopathy. Does not bruise/bleed easily.   Psychiatric/Behavioral: Negative for agitation, behavioral problems, confusion and hallucinations. The patient is not nervous/anxious.        History:  Past Medical History:   Diagnosis Date   • Myocardial infarction (HCC)        History reviewed. No pertinent surgical history.    No family history on file.    Social History     Tobacco Use   • Smoking status: Never   • Smokeless tobacco: Never   Substance Use Topics   • Alcohol use: Yes     Comment: occasionally   • Drug use: No       Medications:   Current Outpatient Medications:   •  amoxicillin-clavulanate (AUGMENTIN) 875-125 MG per tablet, Take 1 tablet by mouth 2 (Two) Times a Day., Disp: 14 tablet, Rfl: 0  •  FLUoxetine (PROzac) 40 MG capsule, Take 40 mg by mouth Daily., Disp: , Rfl:   •  glipizide (GLUCOTROL) 5 MG tablet, glipizide 5 mg tablet  TAKE 1/2 TABLET BY MOUTH TWO TIMES A DAY WITH MEALS FOR 90 DAYS, Disp: , Rfl:   •  hydrOXYzine pamoate (VISTARIL) 50 MG capsule, Take 1 capsule by mouth 3 (Three) Times a Day As Needed for Anxiety., Disp: 20 capsule, Rfl: 0  •  Lactobacillus Rhamnosus, GG, ( Probiotic Digestive Care) capsule, Culturelle 10 billion cell capsule  Take 1 capsule every day by oral route for 30 days., Disp: , Rfl:   •  losartan (COZAAR) 50 MG tablet, losartan 50 mg tablet  Take 1 tablet every day by oral route for 90 days., Disp: , Rfl:   •  traMADol (ULTRAM) 50 MG tablet, Take 1 tablet by mouth Every 6 (Six) Hours As Needed for Severe Pain ., Disp: 12 tablet, Rfl: 0       Allergies:   Allergies   Allergen Reactions   • Codeine Other (See Comments)     headache       OBJECTIVE:    Vital  "Signs:  Vitals:    11/30/22 0855   BP: (!) 108/7   Pulse: 76   Resp: 16   Temp: 97 °F (36.1 °C)   SpO2: 99%   Weight: 119 kg (263 lb)   Height: 188 cm (74\")       Physical Exam:   General Appearance:    Alert, cooperative, in no acute distress   Head:    Normocephalic, without obvious abnormality, atraumatic   Eyes:            Lids and lashes normal, conjunctivae and sclerae normal, no   icterus, no pallor, corneas clear, PERRL   Ears:    Ears appear intact with no abnormalities noted   Throat:   No oral lesions, no thrush, oral mucosa moist   Neck:   No adenopathy, supple, trachea midline, no thyromegaly,  no JVD   Lungs:     Clear to auscultation,respirations regular, even and                  unlabored    Heart:    Regular rhythm and normal rate, normal S1 and S2, no            murmur   Abdomen:     no masses, no organomegaly, soft non-tender, non-distended, no guarding.  Tender right upper quadrant with mild guarding and rebound.  Also tender left lower quadrant.  No abdominal wall hernias or masses.   Extremities:   Moves all extremities well, no edema, no cyanosis, no             redness   Pulses:   Pulses palpable and equal bilaterally   Skin:   No bleeding, bruising or rash   Lymph nodes:   No palpable adenopathy   Neurologic:   Cranial nerves 2 - 12 grossly intact, sensation intact  Psychiatric: No evidence of depression or anxiety   Results Review:   I reviewed the patient's new clinical results.    Review of Systems was reviewed and confirmed as accurate as documented by the MA.    ASSESSMENT/PLAN:    1. Pain of upper abdomen    2. Left lower quadrant abdominal pain    3. History of colon polyps    4. Diverticulitis        I recommend a colonoscopy for further evaluation. The procedure was explained as well as the risks which include but are not limited to bleeding, infection, perforation, abdominal pain etc. The patient understands these risks and the procedure and wishes to proceed.  Continue probiotic " Greek yogurt daily, avoid fatty foods.  Gallbladder ultrasound today was negative for gallstones.  Continue probiotics daily, avoid fatty foods.     Electronically signed by Vee Gupta MD  11/30/22  11:05 EST

## 2022-11-30 ENCOUNTER — OFFICE VISIT (OUTPATIENT)
Dept: SURGERY | Facility: CLINIC | Age: 52
End: 2022-11-30

## 2022-11-30 VITALS
HEIGHT: 74 IN | RESPIRATION RATE: 16 BRPM | OXYGEN SATURATION: 99 % | WEIGHT: 263 LBS | TEMPERATURE: 97 F | BODY MASS INDEX: 33.75 KG/M2 | DIASTOLIC BLOOD PRESSURE: 7 MMHG | HEART RATE: 76 BPM | SYSTOLIC BLOOD PRESSURE: 108 MMHG

## 2022-11-30 DIAGNOSIS — R10.32 LEFT LOWER QUADRANT ABDOMINAL PAIN: ICD-10-CM

## 2022-11-30 DIAGNOSIS — K57.92 DIVERTICULITIS: ICD-10-CM

## 2022-11-30 DIAGNOSIS — Z86.010 HISTORY OF COLON POLYPS: ICD-10-CM

## 2022-11-30 DIAGNOSIS — R10.10 PAIN OF UPPER ABDOMEN: Primary | ICD-10-CM

## 2022-11-30 PROCEDURE — 99204 OFFICE O/P NEW MOD 45 MIN: CPT | Performed by: SURGERY

## 2022-11-30 RX ORDER — LOSARTAN POTASSIUM 50 MG/1
TABLET ORAL
COMMUNITY

## 2022-11-30 RX ORDER — ALUMINUM HYDROXIDE, MAGNESIUM HYDROXIDE, DIMETHICONE 400; 400; 40 MG/5ML; MG/5ML; MG/5ML
LIQUID ORAL
COMMUNITY

## 2022-11-30 RX ORDER — GLIPIZIDE 5 MG/1
TABLET ORAL
COMMUNITY

## 2022-11-30 RX ORDER — POLYETHYLENE GLYCOL 3350 17 G/17G
POWDER, FOR SOLUTION ORAL
Qty: 238 G | Refills: 0 | Status: SHIPPED | OUTPATIENT
Start: 2022-11-30

## 2022-11-30 RX ORDER — BISACODYL 5 MG
TABLET, DELAYED RELEASE (ENTERIC COATED) ORAL
Qty: 4 TABLET | Refills: 0 | Status: SHIPPED | OUTPATIENT
Start: 2022-11-30

## 2022-12-01 ENCOUNTER — TELEPHONE (OUTPATIENT)
Dept: SURGERY | Facility: CLINIC | Age: 52
End: 2022-12-01

## 2022-12-06 ENCOUNTER — OUTSIDE FACILITY SERVICE (OUTPATIENT)
Dept: SURGERY | Facility: CLINIC | Age: 52
End: 2022-12-06

## 2022-12-06 PROCEDURE — 45384 COLONOSCOPY W/LESION REMOVAL: CPT | Performed by: SURGERY

## 2024-05-17 ENCOUNTER — APPOINTMENT (OUTPATIENT)
Dept: CT IMAGING | Facility: HOSPITAL | Age: 54
End: 2024-05-17
Payer: COMMERCIAL

## 2024-05-17 ENCOUNTER — HOSPITAL ENCOUNTER (EMERGENCY)
Facility: HOSPITAL | Age: 54
Discharge: HOME | End: 2024-05-17
Attending: STUDENT IN AN ORGANIZED HEALTH CARE EDUCATION/TRAINING PROGRAM
Payer: COMMERCIAL

## 2024-05-17 VITALS
HEART RATE: 67 BPM | WEIGHT: 265 LBS | BODY MASS INDEX: 35.12 KG/M2 | HEIGHT: 73 IN | SYSTOLIC BLOOD PRESSURE: 134 MMHG | TEMPERATURE: 97.7 F | DIASTOLIC BLOOD PRESSURE: 79 MMHG | RESPIRATION RATE: 14 BRPM | OXYGEN SATURATION: 96 %

## 2024-05-17 DIAGNOSIS — R10.9 ABDOMINAL PAIN, UNSPECIFIED ABDOMINAL LOCATION: Primary | ICD-10-CM

## 2024-05-17 LAB
ALBUMIN SERPL-MCNC: 4.2 G/DL (ref 3.5–5.2)
ALBUMIN/GLOB SERPL: 2 G/DL
ALP SERPL-CCNC: 74 U/L (ref 39–117)
ALT SERPL W P-5'-P-CCNC: 37 U/L (ref 1–41)
ANION GAP SERPL CALCULATED.3IONS-SCNC: 10.3 MMOL/L (ref 5–15)
AST SERPL-CCNC: 23 U/L (ref 1–40)
BASOPHILS # BLD AUTO: 0.04 10*3/MM3 (ref 0–0.2)
BASOPHILS NFR BLD AUTO: 0.5 % (ref 0–1.5)
BILIRUB SERPL-MCNC: 0.5 MG/DL (ref 0–1.2)
BILIRUB UR QL STRIP: NEGATIVE
BUN SERPL-MCNC: 9 MG/DL (ref 6–20)
BUN/CREAT SERPL: 8.6 (ref 7–25)
CALCIUM SPEC-SCNC: 9.1 MG/DL (ref 8.6–10.5)
CHLORIDE SERPL-SCNC: 104 MMOL/L (ref 98–107)
CLARITY UR: CLEAR
CO2 SERPL-SCNC: 23.7 MMOL/L (ref 22–29)
COLOR UR: YELLOW
CREAT SERPL-MCNC: 1.05 MG/DL (ref 0.76–1.27)
DEPRECATED RDW RBC AUTO: 40.8 FL (ref 37–54)
EGFRCR SERPLBLD CKD-EPI 2021: 83.8 ML/MIN/1.73
EOSINOPHIL # BLD AUTO: 0.14 10*3/MM3 (ref 0–0.4)
EOSINOPHIL NFR BLD AUTO: 1.8 % (ref 0.3–6.2)
ERYTHROCYTE [DISTWIDTH] IN BLOOD BY AUTOMATED COUNT: 12.5 % (ref 12.3–15.4)
GLOBULIN UR ELPH-MCNC: 2.1 GM/DL
GLUCOSE SERPL-MCNC: 212 MG/DL (ref 65–99)
GLUCOSE UR STRIP-MCNC: ABNORMAL MG/DL
HCT VFR BLD AUTO: 45.1 % (ref 37.5–51)
HGB BLD-MCNC: 15.4 G/DL (ref 13–17.7)
HGB UR QL STRIP.AUTO: NEGATIVE
HOLD SPECIMEN: NORMAL
HOLD SPECIMEN: NORMAL
IMM GRANULOCYTES # BLD AUTO: 0.03 10*3/MM3 (ref 0–0.05)
IMM GRANULOCYTES NFR BLD AUTO: 0.4 % (ref 0–0.5)
KETONES UR QL STRIP: NEGATIVE
LEUKOCYTE ESTERASE UR QL STRIP.AUTO: NEGATIVE
LIPASE SERPL-CCNC: 36 U/L (ref 13–60)
LYMPHOCYTES # BLD AUTO: 1.42 10*3/MM3 (ref 0.7–3.1)
LYMPHOCYTES NFR BLD AUTO: 18.7 % (ref 19.6–45.3)
MCH RBC QN AUTO: 30.6 PG (ref 26.6–33)
MCHC RBC AUTO-ENTMCNC: 34.1 G/DL (ref 31.5–35.7)
MCV RBC AUTO: 89.7 FL (ref 79–97)
MONOCYTES # BLD AUTO: 0.45 10*3/MM3 (ref 0.1–0.9)
MONOCYTES NFR BLD AUTO: 5.9 % (ref 5–12)
NEUTROPHILS NFR BLD AUTO: 5.5 10*3/MM3 (ref 1.7–7)
NEUTROPHILS NFR BLD AUTO: 72.7 % (ref 42.7–76)
NITRITE UR QL STRIP: NEGATIVE
NRBC BLD AUTO-RTO: 0 /100 WBC (ref 0–0.2)
PH UR STRIP.AUTO: 6.5 [PH] (ref 5–8)
PLATELET # BLD AUTO: 203 10*3/MM3 (ref 140–450)
PMV BLD AUTO: 10.3 FL (ref 6–12)
POTASSIUM SERPL-SCNC: 3.8 MMOL/L (ref 3.5–5.2)
PROT SERPL-MCNC: 6.3 G/DL (ref 6–8.5)
PROT UR QL STRIP: NEGATIVE
RBC # BLD AUTO: 5.03 10*6/MM3 (ref 4.14–5.8)
SODIUM SERPL-SCNC: 138 MMOL/L (ref 136–145)
SP GR UR STRIP: >=1.03 (ref 1–1.03)
TROPONIN T SERPL HS-MCNC: 6 NG/L
UROBILINOGEN UR QL STRIP: ABNORMAL
WBC NRBC COR # BLD AUTO: 7.58 10*3/MM3 (ref 3.4–10.8)
WHOLE BLOOD HOLD COAG: NORMAL
WHOLE BLOOD HOLD SPECIMEN: NORMAL

## 2024-05-17 PROCEDURE — 85025 COMPLETE CBC W/AUTO DIFF WBC: CPT | Performed by: STUDENT IN AN ORGANIZED HEALTH CARE EDUCATION/TRAINING PROGRAM

## 2024-05-17 PROCEDURE — 25810000003 SODIUM CHLORIDE 0.9 % SOLUTION: Performed by: NURSE PRACTITIONER

## 2024-05-17 PROCEDURE — 80053 COMPREHEN METABOLIC PANEL: CPT | Performed by: STUDENT IN AN ORGANIZED HEALTH CARE EDUCATION/TRAINING PROGRAM

## 2024-05-17 PROCEDURE — 74177 CT ABD & PELVIS W/CONTRAST: CPT

## 2024-05-17 PROCEDURE — 81003 URINALYSIS AUTO W/O SCOPE: CPT | Performed by: STUDENT IN AN ORGANIZED HEALTH CARE EDUCATION/TRAINING PROGRAM

## 2024-05-17 PROCEDURE — 93005 ELECTROCARDIOGRAM TRACING: CPT | Performed by: STUDENT IN AN ORGANIZED HEALTH CARE EDUCATION/TRAINING PROGRAM

## 2024-05-17 PROCEDURE — 99285 EMERGENCY DEPT VISIT HI MDM: CPT

## 2024-05-17 PROCEDURE — 25510000001 IOPAMIDOL 61 % SOLUTION: Performed by: STUDENT IN AN ORGANIZED HEALTH CARE EDUCATION/TRAINING PROGRAM

## 2024-05-17 PROCEDURE — 84484 ASSAY OF TROPONIN QUANT: CPT | Performed by: STUDENT IN AN ORGANIZED HEALTH CARE EDUCATION/TRAINING PROGRAM

## 2024-05-17 PROCEDURE — 83690 ASSAY OF LIPASE: CPT | Performed by: STUDENT IN AN ORGANIZED HEALTH CARE EDUCATION/TRAINING PROGRAM

## 2024-05-17 RX ORDER — SODIUM CHLORIDE 0.9 % (FLUSH) 0.9 %
10 SYRINGE (ML) INJECTION AS NEEDED
Status: DISCONTINUED | OUTPATIENT
Start: 2024-05-17 | End: 2024-05-17 | Stop reason: HOSPADM

## 2024-05-17 RX ADMIN — IOPAMIDOL 100 ML: 612 INJECTION, SOLUTION INTRAVENOUS at 09:20

## 2024-05-17 RX ADMIN — SODIUM CHLORIDE 1000 ML: 9 INJECTION, SOLUTION INTRAVENOUS at 10:01

## 2024-05-17 NOTE — ED PROVIDER NOTES
Pt Name: Dwayne Denver Short  MRN: [id number]  : 1970  Date of Encounter: 2024    PCP: Taniya Moise DO      Subjective    History of Present Illness:    Chief Complaint: Abdominal pain    History of Present Illness: Dwayne Denver Short is a 54 y.o. male who presents to the ER complaining of left upper quadrant abdominal pain patient states he has a history of diverticulitis, diverticulosis and has started having left upper quadrant abdominal pain, epigastric pain that started around 1 AM this morning is progressively worsened over the interval.  Patient states for over the last 4 months he has had some numbness in his extremities but states he has been seen by neurologist Dr. Waldron at [facility] and is being worked up for numbness and paresthesia.        Nurses Notes reviewed and agree, including vitals, allergies, social history and prior medical history.       Allergies:    Codeine    Past Medical History:   Diagnosis Date    Myocardial infarction        History reviewed. No pertinent surgical history.    Social History     Socioeconomic History    Marital status: Single   Tobacco Use    Smoking status: Never    Smokeless tobacco: Never   Substance and Sexual Activity    Alcohol use: Yes     Comment: occasionally    Drug use: No       History reviewed. No pertinent family history.    REVIEW OF SYSTEMS:     All systems reviewed and not pertinent unless noted.    Review of Systems   Gastrointestinal:  Positive for abdominal pain and nausea.   Neurological:  Positive for numbness.   All other systems reviewed and are negative.      Objective    Physical Exam  Vitals and nursing note reviewed.   Constitutional:       Appearance: Normal appearance.   HENT:      Head: Normocephalic and atraumatic.   Eyes:      Extraocular Movements: Extraocular movements intact.      Pupils: Pupils are equal, round, and reactive to light.   Cardiovascular:      Rate and Rhythm: Normal rate and regular rhythm.       Pulses: Normal pulses.      Heart sounds: Normal heart sounds.   Pulmonary:      Effort: Pulmonary effort is normal.      Breath sounds: Normal breath sounds.   Abdominal:      General: Bowel sounds are normal.      Palpations: Abdomen is soft.      Tenderness:  in the epigastric area, left upper quadrant and left lower quadrant   Musculoskeletal:         General: Normal range of motion.      Cervical back: Normal range of motion and neck supple.   Skin:     General: Skin is warm and dry.      Capillary Refill: Capillary refill takes less than 2 seconds.   Neurological:      Mental Status: He is alert.      GCS: GCS eye subscore is 4. GCS verbal subscore is 5. GCS motor subscore is 6.      Sensory: Sensation is intact.      Motor: Motor function is intact.   Psychiatric:         Attention and Perception: Attention and perception normal.         Mood and Affect: Mood and affect normal.         Speech: Speech normal.               Procedures    ED Course:         LAB Results:    Lab Results (last 24 hours)       Procedure Component Value Units Date/Time    CBC & Differential [[id number]]  (Abnormal) Collected: 05/17/24 0900    Specimen: Blood Updated: 05/17/24 0907    Narrative:      The following orders were created for panel order CBC & Differential.  Procedure                               Abnormality         Status                     ---------                               -----------         ------                     CBC Auto Differential[[id number]]        Abnormal            Final result                 Please view results for these tests on the individual orders.    Comprehensive Metabolic Panel [[id number]]  (Abnormal) Collected: 05/17/24 0900    Specimen: Blood Updated: 05/17/24 0933     Glucose 212 mg/dL      Comment: Glucose >180, Hemoglobin A1C recommended.        BUN 9 mg/dL      Creatinine 1.05 mg/dL      Sodium 138 mmol/L      Potassium 3.8 mmol/L      Chloride 104 mmol/L      CO2 23.7 mmol/L       Calcium 9.1 mg/dL      Total Protein 6.3 g/dL      Albumin 4.2 g/dL      ALT (SGPT) 37 U/L      AST (SGOT) 23 U/L      Alkaline Phosphatase 74 U/L      Total Bilirubin 0.5 mg/dL      Globulin 2.1 gm/dL      A/G Ratio 2.0 g/dL      BUN/Creatinine Ratio 8.6     Anion Gap 10.3 mmol/L      eGFR 83.8 mL/min/1.73     Narrative:      GFR Normal >60  Chronic Kidney Disease <60  Kidney Failure <15      Lipase [[id number]]  (Normal) Collected: 05/17/24 0900    Specimen: Blood Updated: 05/17/24 0933     Lipase 36 U/L     Single High Sensitivity Troponin T [[id number]]  (Normal) Collected: 05/17/24 0900    Specimen: Blood Updated: 05/17/24 0933     HS Troponin T 6 ng/L     Narrative:      High Sensitive Troponin T Reference Range:  <14.0 ng/L- Negative Female for AMI  <22.0 ng/L- Negative Male for AMI  >=14 - Abnormal Female indicating possible myocardial injury.  >=22 - Abnormal Male indicating possible myocardial injury.   Clinicians would have to utilize clinical acumen, EKG, Troponin, and serial changes to determine if it is an Acute Myocardial Infarction or myocardial injury due to an underlying chronic condition.         CBC Auto Differential [[id number]]  (Abnormal) Collected: 05/17/24 0900    Specimen: Blood Updated: 05/17/24 0907     WBC 7.58 10*3/mm3      RBC 5.03 10*6/mm3      Hemoglobin 15.4 g/dL      Hematocrit 45.1 %      MCV 89.7 fL      MCH 30.6 pg      MCHC 34.1 g/dL      RDW 12.5 %      RDW-SD 40.8 fl      MPV 10.3 fL      Platelets 203 10*3/mm3      Neutrophil % 72.7 %      Lymphocyte % 18.7 %      Monocyte % 5.9 %      Eosinophil % 1.8 %      Basophil % 0.5 %      Immature Grans % 0.4 %      Neutrophils, Absolute 5.50 10*3/mm3      Lymphocytes, Absolute 1.42 10*3/mm3      Monocytes, Absolute 0.45 10*3/mm3      Eosinophils, Absolute 0.14 10*3/mm3      Basophils, Absolute 0.04 10*3/mm3      Immature Grans, Absolute 0.03 10*3/mm3      nRBC 0.0 /100 WBC     Urinalysis With Microscopic If Indicated (No Culture) -  Urine, Clean Catch [[id number]]  (Abnormal) Collected: 05/17/24 0942    Specimen: Urine, Clean Catch Updated: 05/17/24 0957     Color, UA Yellow     Appearance, UA Clear     pH, UA 6.5     Specific Gravity, UA >=1.030     Glucose,  mg/dL (Trace)     Ketones, UA Negative     Bilirubin, UA Negative     Blood, UA Negative     Protein, UA Negative     Leuk Esterase, UA Negative     Nitrite, UA Negative     Urobilinogen, UA 0.2 E.U./dL    Narrative:      Urine microscopic not indicated.             If labs were ordered, I have independently reviewed the results and considered them in the diagnosis and treatment plan for the patient    RADIOLOGY    CT Abdomen Pelvis With Contrast    Result Date: 5/17/2024  PROCEDURE: CT ABDOMEN PELVIS W CONTRAST-  HISTORY:  Abdominal pain, left upper, epigastric  COMPARISON: June 2023.  TECHNIQUE: Multiple axial CT images were obtained from the lung bases through the pubic symphysis following the administration of Isovue 300 contrast.  FINDINGS:  ABDOMEN: The lung bases are clear. The heart is normal in size. The liver is fatty infiltrated. The spleen is unremarkable. No adrenal mass is present.  The pancreas is normal. The kidneys are normal. The aorta is normal in caliber. There is no free fluid or adenopathy. The abdominal portions of the GI tract are unremarkable with no evidence of obstruction.  PELVIS: The appendix is normal.  The urinary bladder is unremarkable. There is no significant free fluid or adenopathy.      Impression: No acute findings.   CTDI: 11.8 mGy DLP: 638.58 mGy.cm   This study was performed with techniques to keep radiation doses as low as reasonably achievable (ALARA). Individualized dose reduction techniques using automated exposure control or adjustment of mA and/or kV according to the patient size were employed.      Images were reviewed, interpreted, and dictated by Dr. William Tam MD Transcribed by Libby Maynard PA-C.        If I have ordered, I  have independently reviewed the above noted radiographic studies.  Please see the radiologist dictation for the official interpretation    Medications given to patient in the ER    Medications   sodium chloride 0.9 % flush 10 mL (has no administration in time range)   sodium chloride 0.9 % flush 10 mL (has no administration in time range)   sodium chloride 0.9 % bolus 1,000 mL (1,000 mL Intravenous New Bag 5/17/24 1001)   iopamidol (ISOVUE-300) 61 % injection 100 mL (100 mL Intravenous Given 5/17/24 0920)                 Shared Decision Making: After my consideration the clinical presentation and laboratory/radiology studies obtained, I discussed the findings with the patient/patient representative who is in agreement with the treatment plan and final disposition. Risks and benefits of discharge and/or observation admission were discussed.  Final disposition of the patient will be discharged home request patient follow-up with primary care provider in the next 7 days for reevaluation.  Patient also requested to continue his follow-up with his neurologist for the numbness and paresthesias that he has been experiencing.    Medications prescribed: No new medications were prescribed during this ER visit       Medical Decision Making  Dwayne Denver Short is a 54 y.o. male who presents to the ER complaining of left upper quadrant abdominal pain patient states he has a history of diverticulitis, diverticulosis and has started having left upper quadrant abdominal pain, epigastric pain that started around 1 AM this morning is progressively worsened over the interval.  Patient states for over the last 4 months he has had some numbness in his extremities but states he has been seen by neurologist Dr. Waldron at [facility] and is being worked up for numbness and paresthesia.    DDX: includes but is not limited to: Cholecystitis, appendicitis, diverticulitis, diverticulosis, colitis, gastroenteritis, other    Amount and/or  Complexity of Data Reviewed  External Data Reviewed:      Details: I have personally reviewed labs, radiology EKG and notes from patient's chart  Labs: ordered. Decision-making details documented in ED Course.     Details: I have personally reviewed and documented all results  Radiology: ordered. Decision-making details documented in ED Course.     Details: I have personally reviewed and documented all results  ECG/medicine tests: ordered. Decision-making details documented in ED Course.     Details: I have personally reviewed and documented all results  Discussion of management or test interpretation with external provider(s): Discussed assessment, treatment and plan with ER attending    Risk  Prescription drug management.  Risk Details: I have discussed with patient the finding of the test preformed today. Patient has been diagnosed with abdominal pain and will be discharged home.  Patient requested to follow-up with primary care provider within the next 7 days for reevaluation. Strict return precautions have been given and patient verbalizes understanding          Final diagnoses:   Abdominal pain, unspecified abdominal location         Please note that portions of this document were completed using voice recognition dictation software.       Last Heck, APRN  05/17/24 1018

## 2025-01-29 ENCOUNTER — HOSPITAL ENCOUNTER (INPATIENT)
Facility: HOSPITAL | Age: 55
LOS: 1 days | Discharge: HOME | End: 2025-01-30
Attending: STUDENT IN AN ORGANIZED HEALTH CARE EDUCATION/TRAINING PROGRAM | Admitting: INTERNAL MEDICINE
Payer: COMMERCIAL

## 2025-01-29 DIAGNOSIS — N17.9 ACUTE KIDNEY INJURY: Primary | ICD-10-CM

## 2025-01-29 DIAGNOSIS — K56.609 SMALL BOWEL OBSTRUCTION: ICD-10-CM

## 2025-01-29 DIAGNOSIS — R73.9 HYPERGLYCEMIA: ICD-10-CM

## 2025-01-29 PROCEDURE — 84484 ASSAY OF TROPONIN QUANT: CPT

## 2025-01-29 PROCEDURE — 99285 EMERGENCY DEPT VISIT HI MDM: CPT | Performed by: STUDENT IN AN ORGANIZED HEALTH CARE EDUCATION/TRAINING PROGRAM

## 2025-01-29 PROCEDURE — 86140 C-REACTIVE PROTEIN: CPT

## 2025-01-29 PROCEDURE — 93005 ELECTROCARDIOGRAM TRACING: CPT

## 2025-01-29 PROCEDURE — 83605 ASSAY OF LACTIC ACID: CPT | Performed by: STUDENT IN AN ORGANIZED HEALTH CARE EDUCATION/TRAINING PROGRAM

## 2025-01-29 PROCEDURE — 80053 COMPREHEN METABOLIC PANEL: CPT | Performed by: STUDENT IN AN ORGANIZED HEALTH CARE EDUCATION/TRAINING PROGRAM

## 2025-01-29 PROCEDURE — 36415 COLL VENOUS BLD VENIPUNCTURE: CPT

## 2025-01-29 PROCEDURE — 83690 ASSAY OF LIPASE: CPT | Performed by: STUDENT IN AN ORGANIZED HEALTH CARE EDUCATION/TRAINING PROGRAM

## 2025-01-29 PROCEDURE — 85025 COMPLETE CBC W/AUTO DIFF WBC: CPT | Performed by: STUDENT IN AN ORGANIZED HEALTH CARE EDUCATION/TRAINING PROGRAM

## 2025-01-29 RX ORDER — SODIUM CHLORIDE 0.9 % (FLUSH) 0.9 %
10 SYRINGE (ML) INJECTION AS NEEDED
Status: DISCONTINUED | OUTPATIENT
Start: 2025-01-29 | End: 2025-01-30 | Stop reason: HOSPADM

## 2025-01-29 RX ORDER — ONDANSETRON 2 MG/ML
4 INJECTION INTRAMUSCULAR; INTRAVENOUS ONCE
Status: COMPLETED | OUTPATIENT
Start: 2025-01-30 | End: 2025-01-30

## 2025-01-30 ENCOUNTER — APPOINTMENT (OUTPATIENT)
Dept: CT IMAGING | Facility: HOSPITAL | Age: 55
End: 2025-01-30
Payer: COMMERCIAL

## 2025-01-30 ENCOUNTER — APPOINTMENT (OUTPATIENT)
Dept: GENERAL RADIOLOGY | Facility: HOSPITAL | Age: 55
End: 2025-01-30
Payer: COMMERCIAL

## 2025-01-30 ENCOUNTER — APPOINTMENT (OUTPATIENT)
Dept: CT IMAGING | Facility: HOSPITAL | Age: 55
DRG: 390 | End: 2025-01-30
Payer: COMMERCIAL

## 2025-01-30 ENCOUNTER — HOSPITAL ENCOUNTER (INPATIENT)
Facility: HOSPITAL | Age: 55
LOS: 3 days | Discharge: HOME | DRG: 390 | End: 2025-02-03
Attending: EMERGENCY MEDICINE | Admitting: INTERNAL MEDICINE
Payer: COMMERCIAL

## 2025-01-30 VITALS
TEMPERATURE: 98.9 F | RESPIRATION RATE: 13 BRPM | HEIGHT: 73 IN | WEIGHT: 270 LBS | BODY MASS INDEX: 35.78 KG/M2 | OXYGEN SATURATION: 94 % | DIASTOLIC BLOOD PRESSURE: 73 MMHG | SYSTOLIC BLOOD PRESSURE: 130 MMHG | HEART RATE: 66 BPM

## 2025-01-30 DIAGNOSIS — K56.609 SMALL BOWEL OBSTRUCTION: Primary | ICD-10-CM

## 2025-01-30 PROBLEM — R79.89 PRERENAL AZOTEMIA: Status: ACTIVE | Noted: 2025-01-30

## 2025-01-30 PROBLEM — R65.10 SIRS (SYSTEMIC INFLAMMATORY RESPONSE SYNDROME): Status: ACTIVE | Noted: 2025-01-30

## 2025-01-30 PROBLEM — K56.600 PARTIAL SMALL BOWEL OBSTRUCTION: Status: ACTIVE | Noted: 2025-01-30

## 2025-01-30 LAB
ALBUMIN SERPL-MCNC: 4 G/DL (ref 3.5–5.2)
ALBUMIN SERPL-MCNC: 4.5 G/DL (ref 3.5–5.2)
ALBUMIN SERPL-MCNC: 4.5 G/DL (ref 3.5–5.2)
ALBUMIN/GLOB SERPL: 1.7 G/DL
ALBUMIN/GLOB SERPL: 1.7 G/DL
ALBUMIN/GLOB SERPL: 1.8 G/DL
ALP SERPL-CCNC: 70 U/L (ref 39–117)
ALP SERPL-CCNC: 76 U/L (ref 39–117)
ALP SERPL-CCNC: 78 U/L (ref 39–117)
ALT SERPL W P-5'-P-CCNC: 36 U/L (ref 1–41)
ALT SERPL W P-5'-P-CCNC: 37 U/L (ref 1–41)
ALT SERPL W P-5'-P-CCNC: 47 U/L (ref 1–41)
ANION GAP SERPL CALCULATED.3IONS-SCNC: 13.9 MMOL/L (ref 5–15)
ANION GAP SERPL CALCULATED.3IONS-SCNC: 14 MMOL/L (ref 5–15)
ANION GAP SERPL CALCULATED.3IONS-SCNC: 9.2 MMOL/L (ref 5–15)
AST SERPL-CCNC: 22 U/L (ref 1–40)
AST SERPL-CCNC: 22 U/L (ref 1–40)
AST SERPL-CCNC: 32 U/L (ref 1–40)
BASOPHILS # BLD AUTO: 0.04 10*3/MM3 (ref 0–0.2)
BASOPHILS # BLD AUTO: 0.06 10*3/MM3 (ref 0–0.2)
BASOPHILS NFR BLD AUTO: 0.3 % (ref 0–1.5)
BASOPHILS NFR BLD AUTO: 0.5 % (ref 0–1.5)
BILIRUB SERPL-MCNC: 0.5 MG/DL (ref 0–1.2)
BILIRUB SERPL-MCNC: 0.7 MG/DL (ref 0–1.2)
BILIRUB SERPL-MCNC: 0.9 MG/DL (ref 0–1.2)
BILIRUB UR QL STRIP: NEGATIVE
BUN SERPL-MCNC: 6 MG/DL (ref 6–20)
BUN SERPL-MCNC: 8 MG/DL (ref 6–20)
BUN SERPL-MCNC: 9 MG/DL (ref 6–20)
BUN/CREAT SERPL: 4.8 (ref 7–25)
BUN/CREAT SERPL: 6.4 (ref 7–25)
BUN/CREAT SERPL: 8.2 (ref 7–25)
CALCIUM SPEC-SCNC: 8.4 MG/DL (ref 8.6–10.5)
CALCIUM SPEC-SCNC: 9.6 MG/DL (ref 8.6–10.5)
CALCIUM SPEC-SCNC: 9.8 MG/DL (ref 8.6–10.5)
CHLORIDE SERPL-SCNC: 102 MMOL/L (ref 98–107)
CHLORIDE SERPL-SCNC: 104 MMOL/L (ref 98–107)
CHLORIDE SERPL-SCNC: 94 MMOL/L (ref 98–107)
CLARITY UR: CLEAR
CO2 SERPL-SCNC: 24.8 MMOL/L (ref 22–29)
CO2 SERPL-SCNC: 25.1 MMOL/L (ref 22–29)
CO2 SERPL-SCNC: 26 MMOL/L (ref 22–29)
COLOR UR: YELLOW
CREAT SERPL-MCNC: 0.97 MG/DL (ref 0.76–1.27)
CREAT SERPL-MCNC: 1.25 MG/DL (ref 0.76–1.27)
CREAT SERPL-MCNC: 1.4 MG/DL (ref 0.76–1.27)
CRP SERPL-MCNC: 0.5 MG/DL (ref 0–0.5)
D-LACTATE SERPL-SCNC: 1.2 MMOL/L (ref 0.5–2)
D-LACTATE SERPL-SCNC: 1.4 MMOL/L (ref 0.5–2)
D-LACTATE SERPL-SCNC: 2.6 MMOL/L (ref 0.5–2)
DEPRECATED RDW RBC AUTO: 40.9 FL (ref 37–54)
DEPRECATED RDW RBC AUTO: 40.9 FL (ref 37–54)
DEPRECATED RDW RBC AUTO: 42.5 FL (ref 37–54)
EGFRCR SERPLBLD CKD-EPI 2021: 59.4 ML/MIN/1.73
EGFRCR SERPLBLD CKD-EPI 2021: 68 ML/MIN/1.73
EGFRCR SERPLBLD CKD-EPI 2021: 92.2 ML/MIN/1.73
EOSINOPHIL # BLD AUTO: 0.03 10*3/MM3 (ref 0–0.4)
EOSINOPHIL # BLD AUTO: 0.12 10*3/MM3 (ref 0–0.4)
EOSINOPHIL NFR BLD AUTO: 0.2 % (ref 0.3–6.2)
EOSINOPHIL NFR BLD AUTO: 0.9 % (ref 0.3–6.2)
ERYTHROCYTE [DISTWIDTH] IN BLOOD BY AUTOMATED COUNT: 12.2 % (ref 12.3–15.4)
ERYTHROCYTE [DISTWIDTH] IN BLOOD BY AUTOMATED COUNT: 12.5 % (ref 12.3–15.4)
ERYTHROCYTE [DISTWIDTH] IN BLOOD BY AUTOMATED COUNT: 12.6 % (ref 12.3–15.4)
GEN 5 1HR TROPONIN T REFLEX: 7 NG/L
GLOBULIN UR ELPH-MCNC: 2.2 GM/DL
GLOBULIN UR ELPH-MCNC: 2.6 GM/DL
GLOBULIN UR ELPH-MCNC: 2.7 GM/DL
GLUCOSE BLDC GLUCOMTR-MCNC: 142 MG/DL (ref 70–130)
GLUCOSE BLDC GLUCOMTR-MCNC: 142 MG/DL (ref 70–130)
GLUCOSE SERPL-MCNC: 150 MG/DL (ref 65–99)
GLUCOSE SERPL-MCNC: 151 MG/DL (ref 65–99)
GLUCOSE SERPL-MCNC: 226 MG/DL (ref 65–99)
GLUCOSE UR STRIP-MCNC: ABNORMAL MG/DL
HCT VFR BLD AUTO: 43.9 % (ref 37.5–51)
HCT VFR BLD AUTO: 48.4 % (ref 37.5–51)
HCT VFR BLD AUTO: 49.7 % (ref 37.5–51)
HGB BLD-MCNC: 15.2 G/DL (ref 13–17.7)
HGB BLD-MCNC: 16.5 G/DL (ref 13–17.7)
HGB BLD-MCNC: 17 G/DL (ref 13–17.7)
HGB UR QL STRIP.AUTO: NEGATIVE
HOLD SPECIMEN: NORMAL
IMM GRANULOCYTES # BLD AUTO: 0.04 10*3/MM3 (ref 0–0.05)
IMM GRANULOCYTES # BLD AUTO: 0.05 10*3/MM3 (ref 0–0.05)
IMM GRANULOCYTES NFR BLD AUTO: 0.3 % (ref 0–0.5)
IMM GRANULOCYTES NFR BLD AUTO: 0.3 % (ref 0–0.5)
KETONES UR QL STRIP: ABNORMAL
LEUKOCYTE ESTERASE UR QL STRIP.AUTO: NEGATIVE
LIPASE SERPL-CCNC: 14 U/L (ref 13–60)
LIPASE SERPL-CCNC: 17 U/L (ref 13–60)
LYMPHOCYTES # BLD AUTO: 1.46 10*3/MM3 (ref 0.7–3.1)
LYMPHOCYTES # BLD AUTO: 1.98 10*3/MM3 (ref 0.7–3.1)
LYMPHOCYTES NFR BLD AUTO: 10 % (ref 19.6–45.3)
LYMPHOCYTES NFR BLD AUTO: 15.1 % (ref 19.6–45.3)
MCH RBC QN AUTO: 30.8 PG (ref 26.6–33)
MCH RBC QN AUTO: 31.4 PG (ref 26.6–33)
MCH RBC QN AUTO: 31.5 PG (ref 26.6–33)
MCHC RBC AUTO-ENTMCNC: 34.1 G/DL (ref 31.5–35.7)
MCHC RBC AUTO-ENTMCNC: 34.2 G/DL (ref 31.5–35.7)
MCHC RBC AUTO-ENTMCNC: 34.6 G/DL (ref 31.5–35.7)
MCV RBC AUTO: 90.3 FL (ref 79–97)
MCV RBC AUTO: 91.1 FL (ref 79–97)
MCV RBC AUTO: 91.9 FL (ref 79–97)
MONOCYTES # BLD AUTO: 0.86 10*3/MM3 (ref 0.1–0.9)
MONOCYTES # BLD AUTO: 0.89 10*3/MM3 (ref 0.1–0.9)
MONOCYTES NFR BLD AUTO: 6.1 % (ref 5–12)
MONOCYTES NFR BLD AUTO: 6.5 % (ref 5–12)
NEUTROPHILS NFR BLD AUTO: 10.07 10*3/MM3 (ref 1.7–7)
NEUTROPHILS NFR BLD AUTO: 12.06 10*3/MM3 (ref 1.7–7)
NEUTROPHILS NFR BLD AUTO: 76.7 % (ref 42.7–76)
NEUTROPHILS NFR BLD AUTO: 83.1 % (ref 42.7–76)
NITRITE UR QL STRIP: NEGATIVE
NRBC BLD AUTO-RTO: 0 /100 WBC (ref 0–0.2)
NRBC BLD AUTO-RTO: 0 /100 WBC (ref 0–0.2)
PH UR STRIP.AUTO: 6 [PH] (ref 5–8)
PLATELET # BLD AUTO: 216 10*3/MM3 (ref 140–450)
PLATELET # BLD AUTO: 252 10*3/MM3 (ref 140–450)
PLATELET # BLD AUTO: 259 10*3/MM3 (ref 140–450)
PMV BLD AUTO: 10.2 FL (ref 6–12)
PMV BLD AUTO: 10.2 FL (ref 6–12)
PMV BLD AUTO: 10.6 FL (ref 6–12)
POTASSIUM SERPL-SCNC: 3.4 MMOL/L (ref 3.5–5.2)
POTASSIUM SERPL-SCNC: 3.9 MMOL/L (ref 3.5–5.2)
POTASSIUM SERPL-SCNC: 3.9 MMOL/L (ref 3.5–5.2)
PROT SERPL-MCNC: 6.2 G/DL (ref 6–8.5)
PROT SERPL-MCNC: 7.1 G/DL (ref 6–8.5)
PROT SERPL-MCNC: 7.2 G/DL (ref 6–8.5)
PROT UR QL STRIP: NEGATIVE
RBC # BLD AUTO: 4.82 10*6/MM3 (ref 4.14–5.8)
RBC # BLD AUTO: 5.36 10*6/MM3 (ref 4.14–5.8)
RBC # BLD AUTO: 5.41 10*6/MM3 (ref 4.14–5.8)
SODIUM SERPL-SCNC: 133 MMOL/L (ref 136–145)
SODIUM SERPL-SCNC: 138 MMOL/L (ref 136–145)
SODIUM SERPL-SCNC: 142 MMOL/L (ref 136–145)
SP GR UR STRIP: >=1.03 (ref 1–1.03)
TROPONIN T NUMERIC DELTA: 0 NG/L
TROPONIN T SERPL HS-MCNC: 7 NG/L
UROBILINOGEN UR QL STRIP: ABNORMAL
WBC NRBC COR # BLD AUTO: 12.51 10*3/MM3 (ref 3.4–10.8)
WBC NRBC COR # BLD AUTO: 13.13 10*3/MM3 (ref 3.4–10.8)
WBC NRBC COR # BLD AUTO: 14.53 10*3/MM3 (ref 3.4–10.8)
WHOLE BLOOD HOLD COAG: NORMAL
WHOLE BLOOD HOLD COAG: NORMAL
WHOLE BLOOD HOLD SPECIMEN: NORMAL
WHOLE BLOOD HOLD SPECIMEN: NORMAL

## 2025-01-30 PROCEDURE — 83605 ASSAY OF LACTIC ACID: CPT | Performed by: STUDENT IN AN ORGANIZED HEALTH CARE EDUCATION/TRAINING PROGRAM

## 2025-01-30 PROCEDURE — 74177 CT ABD & PELVIS W/CONTRAST: CPT

## 2025-01-30 PROCEDURE — 25810000003 SODIUM CHLORIDE 0.9 % SOLUTION

## 2025-01-30 PROCEDURE — 25010000002 ENOXAPARIN PER 10 MG: Performed by: INTERNAL MEDICINE

## 2025-01-30 PROCEDURE — 85025 COMPLETE CBC W/AUTO DIFF WBC: CPT | Performed by: EMERGENCY MEDICINE

## 2025-01-30 PROCEDURE — 74018 RADEX ABDOMEN 1 VIEW: CPT

## 2025-01-30 PROCEDURE — 99285 EMERGENCY DEPT VISIT HI MDM: CPT | Performed by: EMERGENCY MEDICINE

## 2025-01-30 PROCEDURE — 25010000002 METRONIDAZOLE 500 MG/100ML SOLUTION: Performed by: INTERNAL MEDICINE

## 2025-01-30 PROCEDURE — 99222 1ST HOSP IP/OBS MODERATE 55: CPT | Performed by: SURGERY

## 2025-01-30 PROCEDURE — 85027 COMPLETE CBC AUTOMATED: CPT | Performed by: INTERNAL MEDICINE

## 2025-01-30 PROCEDURE — 82948 REAGENT STRIP/BLOOD GLUCOSE: CPT

## 2025-01-30 PROCEDURE — 80053 COMPREHEN METABOLIC PANEL: CPT | Performed by: INTERNAL MEDICINE

## 2025-01-30 PROCEDURE — 25810000003 SODIUM CHLORIDE 0.9 % SOLUTION: Performed by: EMERGENCY MEDICINE

## 2025-01-30 PROCEDURE — 99223 1ST HOSP IP/OBS HIGH 75: CPT | Performed by: INTERNAL MEDICINE

## 2025-01-30 PROCEDURE — 25010000002 HYDROMORPHONE 1 MG/ML SOLUTION: Performed by: STUDENT IN AN ORGANIZED HEALTH CARE EDUCATION/TRAINING PROGRAM

## 2025-01-30 PROCEDURE — 25010000002 MORPHINE PER 10 MG: Performed by: INTERNAL MEDICINE

## 2025-01-30 PROCEDURE — 81003 URINALYSIS AUTO W/O SCOPE: CPT | Performed by: INTERNAL MEDICINE

## 2025-01-30 PROCEDURE — 25010000002 ONDANSETRON PER 1 MG: Performed by: STUDENT IN AN ORGANIZED HEALTH CARE EDUCATION/TRAINING PROGRAM

## 2025-01-30 PROCEDURE — 83605 ASSAY OF LACTIC ACID: CPT | Performed by: EMERGENCY MEDICINE

## 2025-01-30 PROCEDURE — 25510000001 IOPAMIDOL 61 % SOLUTION: Performed by: STUDENT IN AN ORGANIZED HEALTH CARE EDUCATION/TRAINING PROGRAM

## 2025-01-30 PROCEDURE — 84484 ASSAY OF TROPONIN QUANT: CPT

## 2025-01-30 PROCEDURE — 25010000002 CEFTRIAXONE PER 250 MG: Performed by: INTERNAL MEDICINE

## 2025-01-30 PROCEDURE — 25010000002 ONDANSETRON PER 1 MG: Performed by: EMERGENCY MEDICINE

## 2025-01-30 PROCEDURE — 87040 BLOOD CULTURE FOR BACTERIA: CPT

## 2025-01-30 PROCEDURE — 99239 HOSP IP/OBS DSCHRG MGMT >30: CPT | Performed by: NURSE PRACTITIONER

## 2025-01-30 PROCEDURE — 80053 COMPREHEN METABOLIC PANEL: CPT | Performed by: EMERGENCY MEDICINE

## 2025-01-30 PROCEDURE — 25010000002 MORPHINE PER 10 MG: Performed by: EMERGENCY MEDICINE

## 2025-01-30 PROCEDURE — 25810000003 SODIUM CHLORIDE 0.9 % SOLUTION: Performed by: STUDENT IN AN ORGANIZED HEALTH CARE EDUCATION/TRAINING PROGRAM

## 2025-01-30 PROCEDURE — 63710000001 INSULIN REGULAR HUMAN PER 5 UNITS: Performed by: INTERNAL MEDICINE

## 2025-01-30 PROCEDURE — 83690 ASSAY OF LIPASE: CPT | Performed by: EMERGENCY MEDICINE

## 2025-01-30 PROCEDURE — 25010000002 POTASSIUM CHLORIDE PER 2 MEQ OF POTASSIUM: Performed by: INTERNAL MEDICINE

## 2025-01-30 RX ORDER — SODIUM CHLORIDE 0.9 % (FLUSH) 0.9 %
10 SYRINGE (ML) INJECTION AS NEEDED
Status: DISCONTINUED | OUTPATIENT
Start: 2025-01-30 | End: 2025-02-03 | Stop reason: HOSPADM

## 2025-01-30 RX ORDER — NICOTINE POLACRILEX 4 MG
15 LOZENGE BUCCAL
Status: DISCONTINUED | OUTPATIENT
Start: 2025-01-30 | End: 2025-01-30 | Stop reason: HOSPADM

## 2025-01-30 RX ORDER — DEXTROSE MONOHYDRATE 25 G/50ML
25 INJECTION, SOLUTION INTRAVENOUS
Status: DISCONTINUED | OUTPATIENT
Start: 2025-01-30 | End: 2025-01-30 | Stop reason: HOSPADM

## 2025-01-30 RX ORDER — ONDANSETRON 4 MG/1
4 TABLET, ORALLY DISINTEGRATING ORAL 4 TIMES DAILY PRN
Status: DISCONTINUED | OUTPATIENT
Start: 2025-01-30 | End: 2025-01-30 | Stop reason: HOSPADM

## 2025-01-30 RX ORDER — CALCIUM CARBONATE 500 MG/1
2 TABLET, CHEWABLE ORAL 2 TIMES DAILY PRN
Status: DISCONTINUED | OUTPATIENT
Start: 2025-01-30 | End: 2025-01-30 | Stop reason: HOSPADM

## 2025-01-30 RX ORDER — ACETAMINOPHEN 325 MG/1
650 TABLET ORAL EVERY 4 HOURS PRN
Status: DISCONTINUED | OUTPATIENT
Start: 2025-01-30 | End: 2025-01-30 | Stop reason: HOSPADM

## 2025-01-30 RX ORDER — METRONIDAZOLE 500 MG/100ML
500 INJECTION, SOLUTION INTRAVENOUS EVERY 8 HOURS
Status: DISCONTINUED | OUTPATIENT
Start: 2025-01-30 | End: 2025-01-30 | Stop reason: HOSPADM

## 2025-01-30 RX ORDER — NITROGLYCERIN 0.4 MG/1
0.4 TABLET SUBLINGUAL
Status: DISCONTINUED | OUTPATIENT
Start: 2025-01-30 | End: 2025-01-30 | Stop reason: HOSPADM

## 2025-01-30 RX ORDER — DEXTROSE MONOHYDRATE, SODIUM CHLORIDE, AND POTASSIUM CHLORIDE 50; 1.49; 4.5 G/1000ML; G/1000ML; G/1000ML
100 INJECTION, SOLUTION INTRAVENOUS CONTINUOUS
Status: DISCONTINUED | OUTPATIENT
Start: 2025-01-30 | End: 2025-01-30 | Stop reason: HOSPADM

## 2025-01-30 RX ORDER — SODIUM CHLORIDE 0.9 % (FLUSH) 0.9 %
10 SYRINGE (ML) INJECTION EVERY 12 HOURS SCHEDULED
Status: DISCONTINUED | OUTPATIENT
Start: 2025-01-30 | End: 2025-01-30 | Stop reason: HOSPADM

## 2025-01-30 RX ORDER — ONDANSETRON 2 MG/ML
8 INJECTION INTRAMUSCULAR; INTRAVENOUS ONCE
Status: COMPLETED | OUTPATIENT
Start: 2025-01-30 | End: 2025-01-30

## 2025-01-30 RX ORDER — SODIUM CHLORIDE 0.9 % (FLUSH) 0.9 %
10 SYRINGE (ML) INJECTION AS NEEDED
Status: DISCONTINUED | OUTPATIENT
Start: 2025-01-30 | End: 2025-01-30 | Stop reason: HOSPADM

## 2025-01-30 RX ORDER — MORPHINE SULFATE 2 MG/ML
1 INJECTION, SOLUTION INTRAMUSCULAR; INTRAVENOUS
Status: DISCONTINUED | OUTPATIENT
Start: 2025-01-30 | End: 2025-01-30 | Stop reason: HOSPADM

## 2025-01-30 RX ORDER — ONDANSETRON 4 MG/1
4 TABLET, ORALLY DISINTEGRATING ORAL 4 TIMES DAILY PRN
Qty: 20 TABLET | Refills: 0 | Status: SHIPPED | OUTPATIENT
Start: 2025-01-30

## 2025-01-30 RX ORDER — ONDANSETRON 2 MG/ML
4 INJECTION INTRAMUSCULAR; INTRAVENOUS EVERY 6 HOURS PRN
Status: DISCONTINUED | OUTPATIENT
Start: 2025-01-30 | End: 2025-01-30 | Stop reason: HOSPADM

## 2025-01-30 RX ORDER — ONDANSETRON 4 MG/1
4 TABLET, ORALLY DISINTEGRATING ORAL 4 TIMES DAILY PRN
Qty: 20 TABLET | Refills: 0 | Status: CANCELLED | OUTPATIENT
Start: 2025-01-30

## 2025-01-30 RX ORDER — IOPAMIDOL 612 MG/ML
100 INJECTION, SOLUTION INTRAVASCULAR
Status: COMPLETED | OUTPATIENT
Start: 2025-01-30 | End: 2025-01-30

## 2025-01-30 RX ORDER — ENOXAPARIN SODIUM 100 MG/ML
40 INJECTION SUBCUTANEOUS EVERY 24 HOURS
Status: DISCONTINUED | OUTPATIENT
Start: 2025-01-30 | End: 2025-01-30 | Stop reason: HOSPADM

## 2025-01-30 RX ORDER — ONDANSETRON 4 MG/1
4 TABLET, ORALLY DISINTEGRATING ORAL EVERY 6 HOURS PRN
Status: DISCONTINUED | OUTPATIENT
Start: 2025-01-30 | End: 2025-01-30 | Stop reason: HOSPADM

## 2025-01-30 RX ORDER — HYDROXYZINE PAMOATE 50 MG/1
50 CAPSULE ORAL 3 TIMES DAILY PRN
Status: DISCONTINUED | OUTPATIENT
Start: 2025-01-30 | End: 2025-01-30 | Stop reason: HOSPADM

## 2025-01-30 RX ORDER — SODIUM CHLORIDE 9 MG/ML
40 INJECTION, SOLUTION INTRAVENOUS AS NEEDED
Status: DISCONTINUED | OUTPATIENT
Start: 2025-01-30 | End: 2025-01-30 | Stop reason: HOSPADM

## 2025-01-30 RX ADMIN — Medication 10 ML: at 09:35

## 2025-01-30 RX ADMIN — SODIUM CHLORIDE 1000 ML: 9 INJECTION, SOLUTION INTRAVENOUS at 23:08

## 2025-01-30 RX ADMIN — DEXTROSE MONOHYDRATE AND SODIUM CHLORIDE 100 ML/HR: 5; .45 INJECTION, SOLUTION INTRAVENOUS at 03:48

## 2025-01-30 RX ADMIN — Medication 10 ML: at 02:58

## 2025-01-30 RX ADMIN — METRONIDAZOLE 500 MG: 5 INJECTION, SOLUTION INTRAVENOUS at 11:46

## 2025-01-30 RX ADMIN — IOPAMIDOL 100 ML: 612 INJECTION, SOLUTION INTRAVENOUS at 00:10

## 2025-01-30 RX ADMIN — SODIUM CHLORIDE 1400 ML: 9 INJECTION, SOLUTION INTRAVENOUS at 00:55

## 2025-01-30 RX ADMIN — ONDANSETRON 8 MG: 2 INJECTION INTRAMUSCULAR; INTRAVENOUS at 23:08

## 2025-01-30 RX ADMIN — HYDROMORPHONE HYDROCHLORIDE 1 MG: 1 INJECTION, SOLUTION INTRAMUSCULAR; INTRAVENOUS; SUBCUTANEOUS at 00:10

## 2025-01-30 RX ADMIN — ONDANSETRON 4 MG: 2 INJECTION INTRAMUSCULAR; INTRAVENOUS at 00:09

## 2025-01-30 RX ADMIN — MORPHINE SULFATE 1 MG: 2 INJECTION, SOLUTION INTRAMUSCULAR; INTRAVENOUS at 05:01

## 2025-01-30 RX ADMIN — ENOXAPARIN SODIUM 40 MG: 100 INJECTION SUBCUTANEOUS at 06:59

## 2025-01-30 RX ADMIN — SODIUM CHLORIDE 1000 ML: 9 INJECTION, SOLUTION INTRAVENOUS at 00:09

## 2025-01-30 RX ADMIN — MORPHINE SULFATE 4 MG: 4 INJECTION, SOLUTION INTRAMUSCULAR; INTRAVENOUS at 23:08

## 2025-01-30 RX ADMIN — INSULIN HUMAN 2 UNITS: 100 INJECTION, SOLUTION PARENTERAL at 12:08

## 2025-01-30 RX ADMIN — SODIUM CHLORIDE 2000 MG: 9 INJECTION, SOLUTION INTRAVENOUS at 03:49

## 2025-01-30 RX ADMIN — METRONIDAZOLE 500 MG: 5 INJECTION, SOLUTION INTRAVENOUS at 02:57

## 2025-01-30 NOTE — CASE MANAGEMENT/SOCIAL WORK
Case Management Discharge Note      Final Note: Patient DC home via private vehicle. No needs noted at this time.    Provided Post Acute Provider List?: N/A  N/A Provider List Comment: Plans to DC home; no new DME needs  Provided Post Acute Provider Quality & Resource List?: N/A  N/A Quality & Resource List Comment: Plans to DC home; no new DME needs    Selected Continued Care - Discharged on 1/30/2025 Admission date: 1/29/2025 - Discharge disposition: Home or Self Care      Destination    No services have been selected for the patient.                Durable Medical Equipment    No services have been selected for the patient.                Dialysis/Infusion    No services have been selected for the patient.                Home Medical Care    No services have been selected for the patient.                Therapy    No services have been selected for the patient.                Community Resources    No services have been selected for the patient.                Community & DME    No services have been selected for the patient.                    Transportation Services  Private: Car    Final Discharge Disposition Code: 01 - home or self-care

## 2025-01-30 NOTE — CONSULTS
Yamilne Denver Short    1970    Primary Care Provider: Taniya Moise DO    Chief Complaint   Patient presents with    Abdominal Pain          SUBJECTIVE:    History of present illness: I was asked to see this patient today via the emergency room physician and hospitalist service for evaluation and treatment of a several day history of gradually increasing abdominal discomfort associated with nausea and vomiting.  The patient has had a bowel movement last night and is passing flatus.  He is never felt like this in the past, no recent illnesses at home, he has never had a previous abdominal surgery.    The patient's history is significant and the fact that he recently took his second dose of GLP inhibitor for weight loss.    Review of Systems:  Constitutional:  Negative for chills, fever, and unexpected weight change.  HENT: Negative for trouble swallowing and voice change.  Eyes:  Negative for visual disturbance.  Respiratory:  Negative for apnea, cough, chest tightness, shortness of breath, and wheezing.  Cardiovascular:  Negative for chest pain, palpitations, and leg swelling.  Gastrointestinal: There is a history significant for nausea vomiting and abdominal distention  Musculoskeletal:  Negative for back pain, gait problem, and joint swelling.  Skin:  Negative for color change, rash, and wound  Neurological:  Negative for dizziness, syncope, speech difficulty, weakness, numbness, and headaches.  Hematological:  Negative for adenopathy.  Does not bruise/bleed easily.  Psychiatric/Behavioral:  Negative for confusion.  The patient is not nervous/anxious.        History:    Past Medical History:   Diagnosis Date    Diverticulitis     Myocardial infarction        History reviewed. No pertinent surgical history.    History reviewed. No pertinent family history.    Social History     Socioeconomic History    Marital status: Single   Tobacco Use    Smoking status: Never    Smokeless tobacco: Never   Vaping  Use    Vaping status: Never Used   Substance and Sexual Activity    Alcohol use: Yes     Comment: occasionally    Drug use: No    Sexual activity: Defer       Allergies:  Allergies   Allergen Reactions    Codeine Other (See Comments)     headache       Medications:    Current Facility-Administered Medications:     acetaminophen (TYLENOL) tablet 650 mg, 650 mg, Oral, Q4H PRN, Abelardo Tomas DO    calcium carbonate (TUMS) chewable tablet 500 mg (200 mg elemental), 2 tablet, Oral, BID PRN, Abelardo Tomas DO    Calcium Replacement - Follow Nurse / BPA Driven Protocol, , Not Applicable, PRN, Abelardo Tomas, DO    cefTRIAXone (ROCEPHIN) 2,000 mg in sodium chloride 0.9 % 100 mL IVPB-VTB, 2,000 mg, Intravenous, Q24H, Abelarod Tomas DO, Stopped at 01/30/25 0419    dextrose (D50W) (25 g/50 mL) IV injection 25 g, 25 g, Intravenous, Q15 Min PRN, Abelardo Tomas DO    dextrose (GLUTOSE) oral gel 15 g, 15 g, Oral, Q15 Min PRN, Abelardo Tomas DO    dextrose 5 % and sodium chloride 0.45 % with KCl 20 mEq/L infusion, 100 mL/hr, Intravenous, Continuous, Abelardo Tomas DO, Last Rate: 100 mL/hr at 01/30/25 0656, 100 mL/hr at 01/30/25 0656    Enoxaparin Sodium (LOVENOX) syringe 40 mg, 40 mg, Subcutaneous, Q24H, Abelardo Tomas DO, 40 mg at 01/30/25 0659    FLUoxetine (PROzac) capsule 40 mg, 40 mg, Oral, Daily, Abelardo Tomas DO    glucagon (GLUCAGEN) injection 1 mg, 1 mg, Intramuscular, Q15 Min PRN, Abelardo Tomas DO    hydrOXYzine pamoate (VISTARIL) capsule 50 mg, 50 mg, Oral, TID PRN, Abelardo Tomas DO    insulin regular (humuLIN R,novoLIN R) injection 2-9 Units, 2-9 Units, Subcutaneous, Q6H, Abelardo Tomas DO    Magnesium Standard Dose Replacement - Follow Nurse / BPA Driven Protocol, , Not Applicable, PRN, Genoveva, Abelardo Arsen, DO    melatonin tablet 5 mg, 5 mg, Oral, Nightly, Abelardo Tomas,     metroNIDAZOLE (FLAGYL) IVPB 500 mg, 500 mg, Intravenous, Q8H,  Abelardo Tomas DO, Stopped at 01/30/25 0327    Morphine sulfate (PF) injection 1 mg, 1 mg, Intravenous, Q2H PRN, Abelardo Tomas DO, 1 mg at 01/30/25 0501    nitroglycerin (NITROSTAT) SL tablet 0.4 mg, 0.4 mg, Sublingual, Q5 Min PRN, Abelardo Tomas DO    ondansetron ODT (ZOFRAN-ODT) disintegrating tablet 4 mg, 4 mg, Oral, Q6H PRN **OR** ondansetron (ZOFRAN) injection 4 mg, 4 mg, Intravenous, Q6H PRN, Abelardo Tomas DO    ondansetron ODT (ZOFRAN-ODT) disintegrating tablet 4 mg, 4 mg, Translingual, 4x Daily PRN, Abelardo Tomas DO    Pharmacy to Dose enoxaparin (LOVENOX), , Not Applicable, Continuous PRN, Abelardo Tomas DO    Phosphorus Replacement - Follow Nurse / BPA Driven Protocol, , Not Applicable, PRN, Abelardo Tomas,     Potassium Replacement - Follow Nurse / BPA Driven Protocol, , Not Applicable, PRN, Abelardo Tomas,     sodium chloride 0.9 % flush 10 mL, 10 mL, Intravenous, PRN, Abelardo Tomas,     sodium chloride 0.9 % flush 10 mL, 10 mL, Intravenous, Q12H, Abelardo Tomas DO, 10 mL at 01/30/25 0258    sodium chloride 0.9 % flush 10 mL, 10 mL, Intravenous, PRN, Abelardo Tomas DO    sodium chloride 0.9 % infusion 40 mL, 40 mL, Intravenous, PRN, Abelardo Tomas,     Current Outpatient Medications:     amoxicillin-clavulanate (AUGMENTIN) 875-125 MG per tablet, Take 1 tablet by mouth 2 (Two) Times a Day., Disp: 14 tablet, Rfl: 0    bisacodyl 5 MG EC tablet, Use as directed., Disp: 4 tablet, Rfl: 0    eszopiclone (Lunesta) 3 MG tablet, Take 1 tablet by mouth Daily As Needed for Sleep. Take immediately before bedtime, Disp: , Rfl:     FLUoxetine (PROzac) 40 MG capsule, Take 40 mg by mouth Daily., Disp: , Rfl:     glipizide (GLUCOTROL) 5 MG tablet, glipizide 5 mg tablet  TAKE 1/2 TABLET BY MOUTH TWO TIMES A DAY WITH MEALS FOR 90 DAYS, Disp: , Rfl:     hydrOXYzine pamoate (VISTARIL) 50 MG capsule, Take 1 capsule by mouth 3 (Three) Times a Day As  Needed for Anxiety., Disp: 20 capsule, Rfl: 0    ketorolac (TORADOL) 10 MG tablet, Take 1 tablet by mouth Every 6 (Six) Hours As Needed for Severe Pain., Disp: 15 tablet, Rfl: 0    Lactobacillus Rhamnosus, GG, ( Probiotic Digestive Care) capsule, Culturelle 10 billion cell capsule  Take 1 capsule every day by oral route for 30 days., Disp: , Rfl:     losartan (COZAAR) 50 MG tablet, losartan 50 mg tablet  Take 1 tablet every day by oral route for 90 days., Disp: , Rfl:     ondansetron ODT (ZOFRAN-ODT) 4 MG disintegrating tablet, Place 1 tablet on the tongue 4 (Four) Times a Day As Needed for Nausea., Disp: 20 tablet, Rfl: 0    polyethylene glycol (MIRALAX) 17 GM/SCOOP powder, Mix 238g powder with 64 oz of clear liquid. Use as directed., Disp: 238 g, Rfl: 0    traMADol (ULTRAM) 50 MG tablet, Take 1 tablet by mouth Every 6 (Six) Hours As Needed for Severe Pain ., Disp: 12 tablet, Rfl: 0    OBJECTIVE:    Vital Signs:   Vitals:    01/30/25 0542 01/30/25 0647 01/30/25 0654 01/30/25 0819   BP: 120/66 114/65  122/68   BP Location:  Left arm  Left arm   Patient Position:  Lying  Lying   Pulse: 80 90 69 78   Resp:  18  13   Temp:       TempSrc:       SpO2: 96% 96% 96% 95%   Weight:       Height:           Physical Exam:   General Appearance:    Alert, cooperative, in no acute distress   Head:    Normocephalic, without obvious abnormality, atraumatic   Eyes:            Lids and lashes normal, conjunctivae and sclerae normal, no   icterus, no pallor, corneas clear, PERRLA   Throat:   No oral lesions, no thrush, oral mucosa moist   Neck:   No adenopathy, supple, trachea midline, no thyromegaly, no   carotid bruit, no JVD   Lungs:     Clear to auscultation,respirations regular, even and                  unlabored    Heart:    Regular rhythm and normal rate, normal S1 and S2, no            murmur, no gallop, no rub, no click   Chest Wall:    No abnormalities observed   Abdomen:     Normal bowel sounds, no masses, no  organomegaly, soft        non-tender, non-distended, no guarding, no rebound                tenderness   Extremities:   Moves all extremities well, no edema, no cyanosis, no             redness   Pulses:   Pulses palpable and equal bilaterally   Skin:   No bleeding, bruising or rash   Lymph nodes:   No palpable adenopathy   Neurologic:   Cranial nerves 2 - 12 grossly intact, sensation intact, DTR       present and equal bilaterally   Results Review:    Lab Results (last 24 hours)       Procedure Component Value Units Date/Time    POC Glucose Once [[id number]]  (Abnormal) Collected: 01/30/25 0654    Specimen: Blood Updated: 01/30/25 0657     Glucose 142 mg/dL      Comment: Serial Number: [id number]:  [id number]       Urinalysis With Microscopic If Indicated (No Culture) - Urine, Clean Catch [[id number]]  (Abnormal) Collected: 01/30/25 0424    Specimen: Urine, Clean Catch Updated: 01/30/25 0430     Color, UA Yellow     Appearance, UA Clear     pH, UA 6.0     Specific Gravity, UA >=1.030     Glucose,  mg/dL (Trace)     Ketones, UA 15 mg/dL (1+)     Bilirubin, UA Negative     Blood, UA Negative     Protein, UA Negative     Leuk Esterase, UA Negative     Nitrite, UA Negative     Urobilinogen, UA 1.0 E.U./dL    Narrative:      Urine microscopic not indicated.    STAT Lactic Acid, Reflex [[id number]]  (Normal) Collected: 01/30/25 0240    Specimen: Blood Updated: 01/30/25 0259     Lactate 1.2 mmol/L     POC Glucose Once [[id number]]  (Abnormal) Collected: 01/30/25 0242    Specimen: Blood Updated: 01/30/25 0246     Glucose 142 mg/dL      Comment: Serial Number: [id number]:  [id number]       High Sensitivity Troponin T 1Hr [[id number]]  (Normal) Collected: 01/30/25 0046    Specimen: Blood Updated: 01/30/25 0119     HS Troponin T 7 ng/L      Troponin T Numeric Delta 0 ng/L     Narrative:      High Sensitive Troponin T Reference Range:  <14.0 ng/L- Negative Female for AMI  <22.0 ng/L- Negative Male for AMI  >=14 -  Abnormal Female indicating possible myocardial injury.  >=22 - Abnormal Male indicating possible myocardial injury.   Clinicians would have to utilize clinical acumen, EKG, Troponin, and serial changes to determine if it is an Acute Myocardial Infarction or myocardial injury due to an underlying chronic condition.         Blood Culture - Blood, Arm, Right [[id number]] Collected: 01/30/25 0048    Specimen: Blood from Arm, Right Updated: 01/30/25 0055    Blood Culture - Blood, Hand, Left [[id number]] Collected: 01/30/25 0046    Specimen: Blood from Hand, Left Updated: 01/30/25 0055    High Sensitivity Troponin T [[id number]]  (Normal) Collected: 01/29/25 2348    Specimen: Blood Updated: 01/30/25 0033     HS Troponin T 7 ng/L     Narrative:      High Sensitive Troponin T Reference Range:  <14.0 ng/L- Negative Female for AMI  <22.0 ng/L- Negative Male for AMI  >=14 - Abnormal Female indicating possible myocardial injury.  >=22 - Abnormal Male indicating possible myocardial injury.   Clinicians would have to utilize clinical acumen, EKG, Troponin, and serial changes to determine if it is an Acute Myocardial Infarction or myocardial injury due to an underlying chronic condition.         C-reactive Protein [[id number]]  (Normal) Collected: 01/29/25 2348    Specimen: Blood Updated: 01/30/25 0031     C-Reactive Protein 0.50 mg/dL     Lactic Acid, Plasma [[id number]]  (Abnormal) Collected: 01/29/25 2348    Specimen: Blood Updated: 01/30/25 0028     Lactate 2.6 mmol/L     CBC & Differential [[id number]]  (Abnormal) Collected: 01/29/25 2348    Specimen: Blood Updated: 01/30/25 0026    Narrative:      The following orders were created for panel order CBC & Differential.  Procedure                               Abnormality         Status                     ---------                               -----------         ------                     CBC Auto Differential[[id number]]        Abnormal            Final result                  Please view results for these tests on the individual orders.    CBC Auto Differential [[id number]]  (Abnormal) Collected: 01/29/25 2348    Specimen: Blood Updated: 01/30/25 0026     WBC 13.13 10*3/mm3      RBC 5.36 10*6/mm3      Hemoglobin 16.5 g/dL      Hematocrit 48.4 %      MCV 90.3 fL      MCH 30.8 pg      MCHC 34.1 g/dL      RDW 12.5 %      RDW-SD 40.9 fl      MPV 10.2 fL      Platelets 252 10*3/mm3      Neutrophil % 76.7 %      Lymphocyte % 15.1 %      Monocyte % 6.5 %      Eosinophil % 0.9 %      Basophil % 0.5 %      Immature Grans % 0.3 %      Neutrophils, Absolute 10.07 10*3/mm3      Lymphocytes, Absolute 1.98 10*3/mm3      Monocytes, Absolute 0.86 10*3/mm3      Eosinophils, Absolute 0.12 10*3/mm3      Basophils, Absolute 0.06 10*3/mm3      Immature Grans, Absolute 0.04 10*3/mm3      nRBC 0.0 /100 WBC     Comprehensive Metabolic Panel [[id number]]  (Abnormal) Collected: 01/29/25 2348    Specimen: Blood Updated: 01/30/25 0021     Glucose 226 mg/dL      Comment: Glucose >180, Hemoglobin A1C recommended.        BUN 9 mg/dL      Creatinine 1.40 mg/dL      Sodium 133 mmol/L      Potassium 3.4 mmol/L      Chloride 94 mmol/L      CO2 25.1 mmol/L      Calcium 9.8 mg/dL      Total Protein 7.1 g/dL      Albumin 4.5 g/dL      ALT (SGPT) 47 U/L      AST (SGOT) 32 U/L      Alkaline Phosphatase 78 U/L      Total Bilirubin 0.9 mg/dL      Globulin 2.6 gm/dL      A/G Ratio 1.7 g/dL      BUN/Creatinine Ratio 6.4     Anion Gap 13.9 mmol/L      eGFR 59.4 mL/min/1.73     Narrative:      GFR Categories in Chronic Kidney Disease (CKD)      GFR Category          GFR (mL/min/1.73)    Interpretation  G1                     90 or greater         Normal or high (1)  G2                      60-89                Mild decrease (1)  G3a                   45-59                Mild to moderate decrease  G3b                   30-44                Moderate to severe decrease  G4                    15-29                Severe decrease  G5                     14 or less           Kidney failure          (1)In the absence of evidence of kidney disease, neither GFR category G1 or G2 fulfill the criteria for CKD.    eGFR calculation 2021 CKD-EPI creatinine equation, which does not include race as a factor    Lipase [[id number]]  (Normal) Collected: 01/29/25 2348    Specimen: Blood Updated: 01/30/25 0021     Lipase 14 U/L     [place] Draw [[id number]] Collected: 01/29/25 2348    Specimen: Blood Updated: 01/30/25 0000    Narrative:      The following orders were created for panel order [place] Draw.  Procedure                               Abnormality         Status                     ---------                               -----------         ------                     Green Top (Gel)[[id number]]                                  Final result               Lavender Top[[id number]]                                     Final result               Gold Top - SST[[id number]]                                   Final result               Light Blue Top[[id number]]                                   Final result                 Please view results for these tests on the individual orders.    Green Top (Gel) [[id number]] Collected: 01/29/25 2348    Specimen: Blood Updated: 01/30/25 0000     Extra Tube Hold for add-ons.     Comment: Auto resulted.       Lavender Top [[id number]] Collected: 01/29/25 2348    Specimen: Blood Updated: 01/30/25 0000     Extra Tube hold for add-on     Comment: Auto resulted       Gold Top - SST [[id number]] Collected: 01/29/25 2348    Specimen: Blood Updated: 01/30/25 0000     Extra Tube Hold for add-ons.     Comment: Auto resulted.       Light Blue Top [[id number]] Collected: 01/29/25 2348    Specimen: Blood Updated: 01/30/25 0000     Extra Tube Hold for add-ons.     Comment: Auto resulted                 I reviewed the patient's new clinical results.  I reviewed the patient's new imaging results and agree with the interpretation.  I reviewed the  patient's other test results and agree with the interpretation    I did review the patient's old chart, looked at his CT scan and this does show a massively enlarged stomach there is air in the colon mildly dilated small bowel    ASSESSMENT PLAN:      Partial small bowel obstruction    SIRS (systemic inflammatory response syndrome)    Prerenal azotemia      Stable 54-year-old white male who I do not think he has a bowel obstruction.  He got markedly better after NG tube was placed and 2000 cc was removed from the stomach, I think he is having side effects from his GLP inhibitor for weight loss I do not think he has a bowel obstruction.  I think we need to discontinue the NG tube and allow him to ambulate and he most likely can go home today and needs to follow-up with his primary care provider especially with regards to continuation or not of his GLP inhibitor.    I discussed the patients findings and my recommendations with patient, primary care team, and consulting provider    David Vega MD  01/30/25  09:09 EST

## 2025-01-30 NOTE — ED NOTES
Pt again desatted to 86% on 6 LPM NC. Pt converted to 10LPM simple mask with improvement in oxygenation.

## 2025-01-30 NOTE — CASE MANAGEMENT/SOCIAL WORK
Discharge Planning Assessment   [place]     Patient Name: Dwayne Denver Short  MRN: [id number]  Today's Date: 1/30/2025    Admit Date: 1/29/2025    Plan: Plans to DC home; no new DME needs   Discharge Needs Assessment       Row Name 01/30/25 1329       Living Environment    People in Home alone    Current Living Arrangements apartment    Duration at Residence 4 years    Potentially Unsafe Housing Conditions none    In the past 12 months has the electric, gas, oil, or water company threatened to shut off services in your home? No    Primary Care Provided by self    Provides Primary Care For no one    Family Caregiver if Needed none    Quality of Family Relationships helpful;involved    Able to Return to Prior Arrangements yes       Resource/Environmental Concerns    Resource/Environmental Concerns none    Transportation Concerns none       Transportation Needs    In the past 12 months, has lack of transportation kept you from medical appointments or from getting medications? no    In the past 12 months, has lack of transportation kept you from meetings, work, or from getting things needed for daily living? No       Food Insecurity    Within the past 12 months, you worried that your food would run out before you got the money to buy more. Never true    Within the past 12 months, the food you bought just didn't last and you didn't have money to get more. Never true       Transition Planning    Patient/Family Anticipates Transition to home    Patient/Family Anticipated Services at Transition none    Transportation Anticipated family or friend will provide       Discharge Needs Assessment    Readmission Within the Last 30 Days no previous admission in last 30 days    Equipment Currently Used at Home cpap    Concerns to be Addressed denies needs/concerns at this time    Do you want help finding or keeping work or a job? I do not need or want help    Anticipated Changes Related to Illness none    Equipment Needed After  Discharge none    Provided Post Acute Provider List? N/A    N/A Provider List Comment Plans to DC home; no new DME needs    Provided Post Acute Provider Quality & Resource List? N/A    N/A Quality & Resource List Comment Plans to DC home; no new DME needs    Offered/Gave Vendor List no                   Discharge Plan       Row Name 01/30/25 6421       Plan    Plan Plans to DC home; no new DME needs    Patient/Family in Agreement with Plan yes    Provided Post Acute Provider List? N/A    N/A Provider List Comment Plans to DC home; no new DME needs    Provided Post Acute Provider Quality & Resource List? N/A    N/A Quality & Resource List Comment Plans to DC home; no new DME needs    Plan Comments Patient is awake and able to answer questions.  He is a current patient of Dr. Don and gets his medications from [facility]..  He elects to enroll in [facility].  He uses a c-pap from [facility].  He denies the need for additional DME or services at DC.  At the time of DC the patient plans to return home where he lives alone.  Questions and concerns were addressed at the time of this conversation.  Will provide additional resources and information upon request.    Final Note Plans to DC home; no new DME needs                  Continued Care and Services - Discharged on 1/30/2025 Admission date: 1/29/2025 - Discharge disposition: Home or Self Care   No active coordination exists for this encounter.       Expected Discharge Date and Time       Expected Discharge Date Expected Discharge Time    Jan 30, 2025            Demographic Summary       Row Name 01/30/25 1328       General Information    Admission Type inpatient    Arrived From emergency department    Referral Source admission list    Reason for Consult discharge planning    Preferred Language English       Contact Information    Permission Granted to Share Info With ;family/designee                   Functional Status       Row Name 01/30/25 1322        Functional Status    Usual Activity Tolerance excellent    Current Activity Tolerance moderate       Physical Activity    On average, how many days per week do you engage in moderate to strenuous exercise (like a brisk walk)? 0 days    On average, how many minutes do you engage in exercise at this level? 0 min    Number of minutes of exercise per week 0       Assessment of Health Literacy    How often do you have someone help you read hospital materials? Never    How often do you have problems learning about your medical condition because of difficulty understanding written information? Never    How often do you have a problem understanding what is told to you about your medical condition? Never    How confident are you filling out medical forms by yourself? Extremely    Health Literacy Excellent       Functional Status, IADL    Medications independent    Meal Preparation independent    Housekeeping independent    Laundry independent    Shopping independent    If for any reason you need help with day-to-day activities such as bathing, preparing meals, shopping, managing finances, etc., do you get the help you need? I don't need any help       Mental Status    General Appearance WDL WDL       Mental Status Summary    Recent Changes in Mental Status/Cognitive Functioning no changes       Employment/    Employment Status employed full-time    Shift Worked first shift    Current or Previous Occupation professional                   Psychosocial       Row Name 01/30/25 3546       Values/Beliefs    Spiritual, Cultural Beliefs, Jehovah's witness Practices, Values that Affect Care no       Behavior WDL    Behavior WDL WDL       Emotion Mood WDL    Emotion/Mood/Affect WDL WDL       Speech WDL    Speech WDL WDL       Perceptual State WDL    Perceptual State WDL WDL       Thought Process WDL    Thought Process WDL WDL       Intellectual Performance WDL    Intellectual Performance WDL WDL                   Abuse/Neglect        Row Name 01/30/25 1329       Personal Safety    Feels Unsafe at Home or Work/School no    Feels Threatened by Someone no    Does Anyone Try to Keep You From Having Contact with Others or Doing Things Outside Your Home? no    Physical Signs of Abuse Present no                   Legal       Row Name 01/30/25 1329       Financial Resource Strain    How hard is it for you to pay for the very basics like food, housing, medical care, and heating? Not hard                   Substance Abuse       Row Name 01/30/25 1329       Substance Use    Substance Use Status never used                   Patient Forms    No documentation.                     Cony Macias RN

## 2025-01-30 NOTE — PLAN OF CARE
Goal Outcome Evaluation:  Plan of Care Reviewed With: family, patient

## 2025-01-30 NOTE — ED PROVIDER NOTES
"Subjective  History of Present Illness:    Patient is a 54-year-old male presented for evaluation of diffuse abdominal pain, he is not endorsing a chest discomfort or shortness of breath, he is reporting abdominal pain, generalized in nature, mostly epigastric in nature.  No fevers, he denies any urinary symptoms, no testicular pain.  Sudden onset nausea vomiting abdominal pain several hours ago, he denies any traumatic injuries.  He is extremely uncomfortable appearing on arrival, tachycardic at a rate of 107.  No back or flank pain.  Normal bowel movements, no hematochezia no melena.  Patient is unable to describe the pain other than \"it hurts\".      Nurses Notes reviewed and agree, including vitals, allergies, social history and prior medical history.     REVIEW OF SYSTEMS: All systems reviewed and not pertinent unless noted.  Review of Systems   Constitutional:  Negative for fever.   Respiratory:  Negative for shortness of breath.    Cardiovascular:  Negative for chest pain.   Gastrointestinal:  Positive for abdominal pain, nausea and vomiting. Negative for diarrhea.   Musculoskeletal:  Negative for back pain.   All other systems reviewed and are negative.      Past Medical History:   Diagnosis Date    Diverticulitis     Myocardial infarction        Allergies:    Codeine      History reviewed. No pertinent surgical history.      Social History     Socioeconomic History    Marital status: Single   Tobacco Use    Smoking status: Never    Smokeless tobacco: Never   Vaping Use    Vaping status: Never Used   Substance and Sexual Activity    Alcohol use: Yes     Comment: occasionally    Drug use: No    Sexual activity: Defer         History reviewed. No pertinent family history.    Objective  Physical Exam:  /75   Pulse 77   Temp 98.9 °F (37.2 °C) (Oral)   Resp 20   Ht 185.4 cm (73\")   Wt 122 kg (270 lb)   SpO2 96%   BMI 35.62 kg/m²      Physical Exam  Vitals and nursing note reviewed.   Constitutional:  "      General: He is in acute distress.      Appearance: He is obese. He is ill-appearing. He is not toxic-appearing or diaphoretic.   HENT:      Head: Normocephalic and atraumatic.      Mouth/Throat:      Mouth: Mucous membranes are moist.      Pharynx: Oropharynx is clear.   Eyes:      Extraocular Movements: Extraocular movements intact.   Cardiovascular:      Rate and Rhythm: Regular rhythm. Tachycardia present.      Heart sounds: Normal heart sounds.   Pulmonary:      Effort: Pulmonary effort is normal. No respiratory distress.      Breath sounds: Normal breath sounds. No stridor. No wheezing, rhonchi or rales.   Abdominal:      General: Abdomen is protuberant. There is distension.      Palpations: Abdomen is soft.      Tenderness: There is generalized abdominal tenderness. There is guarding.      Hernia: No hernia is present.   Skin:     General: Skin is warm and dry.      Capillary Refill: Capillary refill takes less than 2 seconds.   Neurological:      General: No focal deficit present.      Mental Status: He is alert and oriented to person, place, and time.      Cranial Nerves: No cranial nerve deficit.   Psychiatric:         Mood and Affect: Mood is anxious.         Behavior: Behavior normal.               Procedures    ED Course:    ED Course as of 01/30/25 0150   Thu Jan 30, 2025   0026 WBC(!): 13.13 [JR]   0029 Lactate(!!): 2.6 [JR]   0031 Patient experienced acute desaturation after Dilaudid administration and placed on nasal cannula.  Lactic at 2.6, leukocytosis of 13, tachycardia, meeting SIRS criteria and blood cultures were obtained at this time, sepsis fluids ordered. [JR]      ED Course User Index  [JR] Casey Pool PA-C       Lab Results (last 24 hours)       Procedure Component Value Units Date/Time    CBC & Differential [[id number]]  (Abnormal) Collected: 01/29/25 2348    Specimen: Blood Updated: 01/30/25 0026    Narrative:      The following orders were created for panel order CBC &  Differential.  Procedure                               Abnormality         Status                     ---------                               -----------         ------                     CBC Auto Differential[[id number]]        Abnormal            Final result                 Please view results for these tests on the individual orders.    Comprehensive Metabolic Panel [[id number]]  (Abnormal) Collected: 01/29/25 2348    Specimen: Blood Updated: 01/30/25 0021     Glucose 226 mg/dL      Comment: Glucose >180, Hemoglobin A1C recommended.        BUN 9 mg/dL      Creatinine 1.40 mg/dL      Sodium 133 mmol/L      Potassium 3.4 mmol/L      Chloride 94 mmol/L      CO2 25.1 mmol/L      Calcium 9.8 mg/dL      Total Protein 7.1 g/dL      Albumin 4.5 g/dL      ALT (SGPT) 47 U/L      AST (SGOT) 32 U/L      Alkaline Phosphatase 78 U/L      Total Bilirubin 0.9 mg/dL      Globulin 2.6 gm/dL      A/G Ratio 1.7 g/dL      BUN/Creatinine Ratio 6.4     Anion Gap 13.9 mmol/L      eGFR 59.4 mL/min/1.73     Narrative:      GFR Categories in Chronic Kidney Disease (CKD)      GFR Category          GFR (mL/min/1.73)    Interpretation  G1                     90 or greater         Normal or high (1)  G2                      60-89                Mild decrease (1)  G3a                   45-59                Mild to moderate decrease  G3b                   30-44                Moderate to severe decrease  G4                    15-29                Severe decrease  G5                    14 or less           Kidney failure          (1)In the absence of evidence of kidney disease, neither GFR category G1 or G2 fulfill the criteria for CKD.    eGFR calculation 2021 CKD-EPI creatinine equation, which does not include race as a factor    Lipase [[id number]]  (Normal) Collected: 01/29/25 2348    Specimen: Blood Updated: 01/30/25 0021     Lipase 14 U/L     Lactic Acid, Plasma [[id number]]  (Abnormal) Collected: 01/29/25 2348    Specimen: Blood Updated:  01/30/25 0028     Lactate 2.6 mmol/L     CBC Auto Differential [[id number]]  (Abnormal) Collected: 01/29/25 2348    Specimen: Blood Updated: 01/30/25 0026     WBC 13.13 10*3/mm3      RBC 5.36 10*6/mm3      Hemoglobin 16.5 g/dL      Hematocrit 48.4 %      MCV 90.3 fL      MCH 30.8 pg      MCHC 34.1 g/dL      RDW 12.5 %      RDW-SD 40.9 fl      MPV 10.2 fL      Platelets 252 10*3/mm3      Neutrophil % 76.7 %      Lymphocyte % 15.1 %      Monocyte % 6.5 %      Eosinophil % 0.9 %      Basophil % 0.5 %      Immature Grans % 0.3 %      Neutrophils, Absolute 10.07 10*3/mm3      Lymphocytes, Absolute 1.98 10*3/mm3      Monocytes, Absolute 0.86 10*3/mm3      Eosinophils, Absolute 0.12 10*3/mm3      Basophils, Absolute 0.06 10*3/mm3      Immature Grans, Absolute 0.04 10*3/mm3      nRBC 0.0 /100 WBC     C-reactive Protein [[id number]]  (Normal) Collected: 01/29/25 2348    Specimen: Blood Updated: 01/30/25 0031     C-Reactive Protein 0.50 mg/dL     High Sensitivity Troponin T [[id number]]  (Normal) Collected: 01/29/25 2348    Specimen: Blood Updated: 01/30/25 0033     HS Troponin T 7 ng/L     Narrative:      High Sensitive Troponin T Reference Range:  <14.0 ng/L- Negative Female for AMI  <22.0 ng/L- Negative Male for AMI  >=14 - Abnormal Female indicating possible myocardial injury.  >=22 - Abnormal Male indicating possible myocardial injury.   Clinicians would have to utilize clinical acumen, EKG, Troponin, and serial changes to determine if it is an Acute Myocardial Infarction or myocardial injury due to an underlying chronic condition.         Blood Culture - Blood, Hand, Left [[id number]] Collected: 01/30/25 0046    Specimen: Blood from Hand, Left Updated: 01/30/25 0055    High Sensitivity Troponin T 1Hr [[id number]]  (Normal) Collected: 01/30/25 0046    Specimen: Blood Updated: 01/30/25 0119     HS Troponin T 7 ng/L      Troponin T Numeric Delta 0 ng/L     Narrative:      High Sensitive Troponin T Reference Range:  <14.0 ng/L-  Negative Female for AMI  <22.0 ng/L- Negative Male for AMI  >=14 - Abnormal Female indicating possible myocardial injury.  >=22 - Abnormal Male indicating possible myocardial injury.   Clinicians would have to utilize clinical acumen, EKG, Troponin, and serial changes to determine if it is an Acute Myocardial Infarction or myocardial injury due to an underlying chronic condition.         Blood Culture - Blood, Arm, Right [[id number]] Collected: 01/30/25 0048    Specimen: Blood from Arm, Right Updated: 01/30/25 0055             CT Abdomen Pelvis With Contrast    Addendum Date: 1/30/2025    ADDENDUM REPORT ADDENDUM: This report was discussed with Dr. Eduin Faria MD on Jan 30, 2025 00:58:00 EST. Authenticated and Electronically Signed by Johnnie Stevens MD on 01/30/2025 12:59:22 AM    Result Date: 1/30/2025  FINAL REPORT TECHNIQUE: null CLINICAL HISTORY: Epigastric and generalized abdominal pain, history of diverticulitis,, eval fo COMPARISON: null FINDINGS: CT abdomen and pelvis with contrast Comparison: None Findings: There is mild bibasilar atelectasis. There is mild hepatic steatosis. The gallbladder, pancreas, adrenal glands, and kidneys are unremarkable. There are calcified splenic granulomas. The stomach is mildly distended. The small bowel is mildly distended. There is transition to normal diameter small bowel within the right lower quadrant. The appendix is normal. There is mild colonic diverticulosis. There is no acute diverticulitis. There is no free fluid or free air. The aorta is normal in diameter. IVC is widely patent. There are no enlarged lymph nodes. There is no fracture or suspicious lytic or sclerotic lesion.     Impression: IMPRESSION: 1. Mildly distended stomach and small bowel with transition to normal diameter small bowel in the right lower quadrant consistent with partial small bowel obstruction, ileus, or gastroenteritis. 2. Mild colonic diverticulosis. 3. Mild hepatic steatosis.  Authenticated and Electronically Signed by Johnnie Stevens MD on 01/30/2025 12:55:13 AM        [facility]      Initial impression of presenting illness: 54-year-old male presented for evaluation of abdominal pain nausea vomiting sudden onset in nature.    DDX: includes but is not limited to: Appendicitis, gastritis, peptic ulcer disease, perforated diverticulitis, diverticulitis, sepsis, intra-abdominal infection, gallbladder abnormality, dehydration, viral GI illness, gastroenteritis, atypical ACS, bowel obstruction, among others    Patient arrives hemodynamically stable, afebrile, tachycardic at a rate of 107, mildly tachypneic, nonhypoxic with vitals interpreted by myself.     Pertinent features from physical exam: Abdomen is distended, patient has significant tenderness with guarding to the epigastric region and particularly but diffusely throughout the abdomen.  Negative CVA tenderness, lungs grossly clear, cardiac auscultation tachycardic rate regular rhythm..    Initial diagnostic plan: CBC CMP lactic acid lipase CRP troponin urinalysis EKG CT abdomen pelvis with contrast    Results from initial plan were reviewed and interpreted by me revealing CBC leukocytosis 13.3, lactic acidosis 2.6, troponin of 7, CRP normal.  CMP with hyperglycemia, creatinine of 1.4 concerning for mild acute kidney injury, sodium of 133, likely pseudohyponatremia.  Potassium 3.4.  Blood cultures x 2 pending, CT imaging concerning for partial small bowel obstruction versus ileus per radiology interpretation.  Blood cultures obtained and pending.  Troponin flat x 2, ACS not suspected.  CRP within normal limits.  KUB per my independent review reveals NG tube in appropriate position.  Radiology overread pending.    Diagnostic information from other sources: Record reviewed    Interventions / Re-evaluation:   Medications   sodium chloride 0.9 % flush 10 mL (has no administration in time range)   sodium chloride 0.9 % bolus 1,400 mL (1,400 mL  Intravenous New Bag 1/30/25 0055)   FLUoxetine (PROzac) capsule 40 mg (has no administration in time range)   hydrOXYzine pamoate (VISTARIL) capsule 50 mg (has no administration in time range)   ondansetron ODT (ZOFRAN-ODT) disintegrating tablet 4 mg (has no administration in time range)   sodium chloride 0.9 % flush 10 mL (has no administration in time range)   sodium chloride 0.9 % flush 10 mL (has no administration in time range)   sodium chloride 0.9 % infusion 40 mL (has no administration in time range)   acetaminophen (TYLENOL) tablet 650 mg (has no administration in time range)   calcium carbonate (TUMS) chewable tablet 500 mg (200 mg elemental) (has no administration in time range)   ondansetron ODT (ZOFRAN-ODT) disintegrating tablet 4 mg (has no administration in time range)     Or   ondansetron (ZOFRAN) injection 4 mg (has no administration in time range)   melatonin tablet 5 mg (has no administration in time range)   Pharmacy to Dose enoxaparin (LOVENOX) (has no administration in time range)   nitroglycerin (NITROSTAT) SL tablet 0.4 mg (has no administration in time range)   dextrose (GLUTOSE) oral gel 15 g (has no administration in time range)   dextrose (D50W) (25 g/50 mL) IV injection 25 g (has no administration in time range)   glucagon (GLUCAGEN) injection 1 mg (has no administration in time range)   insulin regular (humuLIN R,novoLIN R) injection 2-9 Units (has no administration in time range)   Morphine sulfate (PF) injection 1 mg (has no administration in time range)   Potassium Replacement - Follow Nurse / BPA Driven Protocol (has no administration in time range)   Magnesium Standard Dose Replacement - Follow Nurse / BPA Driven Protocol (has no administration in time range)   Phosphorus Replacement - Follow Nurse / BPA Driven Protocol (has no administration in time range)   Calcium Replacement - Follow Nurse / BPA Driven Protocol (has no administration in time range)   dextrose 5 % and sodium  chloride 0.45 % with KCl 20 mEq/L infusion (has no administration in time range)   cefTRIAXone (ROCEPHIN) 2,000 mg in sodium chloride 0.9 % 100 mL IVPB-VTB (has no administration in time range)   metroNIDAZOLE (FLAGYL) IVPB 500 mg (has no administration in time range)   HYDROmorphone (DILAUDID) injection 1 mg (1 mg Intravenous Given 1/30/25 0010)   ondansetron (ZOFRAN) injection 4 mg (4 mg Intravenous Given 1/30/25 0009)   sodium chloride 0.9 % bolus 1,000 mL (0 mL Intravenous Stopped 1/30/25 0054)   iopamidol (ISOVUE-300) 61 % injection 100 mL (100 mL Intravenous Given 1/30/25 0010)   NG tube placed by nursing staff, significant output obtained.    Results/clinical rationale were discussed with patient at bedside, he is agreeable to admission for further consultation and evaluation.    Consultations/Discussion of results with other physicians: Discussed with Dr. Vega of general surgery, recommended NG tube, admit to medicine, will consult in the a.m.,  Then discussed with hospitalist for admission, Dr. Tomas.    Disposition plan: Admit to medicine.  -----    Final diagnoses:   Acute kidney injury   Small bowel obstruction   Hyperglycemia          Casey Pool PA-C  01/30/25 0153

## 2025-01-30 NOTE — DISCHARGE INSTRUCTIONS
Patient to be discharged home.  Stop Mounjaro at this time.  Discuss further with PCP.  Take medications as previously taking.  Continue with full liquid diet at this time.  Can advance to soft foods over the next day or 2.  If worsening abdominal pain or nausea vomiting return to emergency department.   Please review pap, looks like it results this AM

## 2025-01-30 NOTE — PROGRESS NOTES
"Pharmacy Consult - Enoxaparin Dosing    Dwayne Denver Short is a 54 y.o. male who has been consulted to dose enoxaparin for VTE prophylaxis.     Allergies  Codeine    Relevant clinical data and objective history reviewed:   [Ht: 185.4 cm (73\"); Wt: 122 kg (270 lb)]  Body mass index is 35.62 kg/m².    Estimated Creatinine Clearance: 81.5 mL/min (A) (by C-G formula based on SCr of 1.4 mg/dL (H)).    Results from last 7 days   Lab Units 01/29/25  2348   HEMOGLOBIN g/dL 16.5   HEMATOCRIT % 48.4   PLATELETS 10*3/mm3 252   CREATININE mg/dL 1.40*       Asessment/Plan  Initiate Enoxaparin 40 mg SQ every 24 hours  Pharmacy will monitor Mr. Solitario's renal function and clinical status and adjust the enoxaparin dose and/or frequency as needed.    Thank you  Bonnie Taylor RPH,PharmD  1/30/2025  03:34 EST      "

## 2025-01-30 NOTE — PAYOR COMM NOTE
"TO:BC  FROM:THIEN MUHAMMAD, RN PHONE [phone]  CLINICALS REF# [id number]    Short, Dwayne Denver (54 y.o. Male)       Date of Birth   1970    Social Security Number       Address   [address] [place] [id number]    Home Phone   [phone]    MRN   [id number]       Rastafarian   Lutheran    Marital Status   Single                            Admission Date   1/29/25    Admission Type   Emergency    Admitting Provider   Abelardo Tomas DO    Attending Provider   Rizwana Francois DO    Department, Room/Bed   [facility] EMERGENCY DEPARTMENT, 01SF/01       Discharge Date       Discharge Disposition       Discharge Destination                                 Attending Provider: Rizwana Francois DO    Allergies: Codeine    Isolation: None   Infection: None   Code Status: CPR    Ht: 185.4 cm (73\")   Wt: 122 kg (270 lb)    Admission Cmt: None   Principal Problem: Partial small bowel obstruction [K56.600]                   Active Insurance as of 1/29/2025       Primary Coverage       Payor Plan Insurance Group Employer/Plan Group    [employer]HEM BLUE CROSS ANTHEM BLUE CROSS BLUE SHIELD PPO [id number]       Payor Plan Address Payor Plan Phone Number Payor Plan Fax Number Effective Dates    [address] [phone]  1/1/2025 - None Entered    [facility] [id number]         Subscriber Name Subscriber Birth Date Member ID       YAMIL NEAL 1970 [id number]               Secondary Coverage       Payor Plan Insurance Group Employer/Plan Group    [employer]HEM BLUE CROSS ANTHEM BLUE CROSS BLUE SHIELD PPO [id number]       Payor Plan Address Payor Plan Phone Number Payor Plan Fax Number Effective Dates    [address] [phone]  1/1/2022 - 1/29/2025    [facility] [address]         Subscriber Name Subscriber Birth Date Member ID       SHORT,DWAYNE DENVER 1970 [id number]                     Emergency Contacts        (Rel.) Home Phone Work Phone Mobile Phone    " Petrona Solitario (Mother) [phone] -- --                 History & Physical        GenovevaAbelardo  at 25 0226            [facility]   HISTORY AND PHYSICAL      Name:  Dwayne Denver Short   Age:  54 y.o.  Sex:  male  :  1970  MRN:  [id number]   Visit Number:  [id number]  Admission Date:  2025  Date Of Service:  25  Primary Care Physician:  Taniya Moise DO    Chief Complaint:     Abdominal pain    History Of Presenting Illness:      54-year-old male.  Past medical history: Diverticulitis, myocardial infarction, prediabetes.  Chief complaint vomiting.  Presents following a 2-hours history of abdominal tenderness associated with multiple episodes of vomiting.  Pain was sharp and mid abdominal. Was given Dilaudid in the ED and had desaturated his oxygen levels.  Was also noted to meet SIRS criteria.  Case was discussed with Dr. Vega who recommended medical admission and that he would see the patient in the morning. Reports is feeling better since starting on the NG tube.  Full code.    Pertinent findings: Highly sensitive troponin 7, sodium 133, potassium 3.4, creatinine 1.4 on a normal baseline, lactate 2.6, blood glucose 226, ALT 47, lipase 14, WBC 13.13, blood cultures pending, CT of the abdomen pelvis showing a mildly distended stomach and small bowel with a transition point in the small bowel on the right lower quadrant consistent with partial small bowel obstruction ileus or gastroenteritis, EKG shows a sinus rhythm with a right bundle branch block nonspecific T wave changes    ED Medications:    Medications   sodium chloride 0.9 % flush 10 mL (has no administration in time range)   sodium chloride 0.9 % bolus 1,400 mL (1,400 mL Intravenous New Bag 25 0055)   HYDROmorphone (DILAUDID) injection 1 mg (1 mg Intravenous Given 25 0010)   ondansetron (ZOFRAN) injection 4 mg (4 mg Intravenous Given 25 0009)   sodium chloride 0.9 % bolus  1,000 mL (0 mL Intravenous Stopped 1/30/25 0054)   iopamidol (ISOVUE-300) 61 % injection 100 mL (100 mL Intravenous Given 1/30/25 0010)         Edited by: Abelardo Tomas DO at 1/30/2025 0222     Review Of Systems:    All systems were reviewed and negative except as mentioned in history of presenting illness, assessment and plan.    Past Medical History: Patient  has a past medical history of Diverticulitis and Myocardial infarction.    Past Surgical History: Patient  has no past surgical history on file.    Social History: Patient  reports that he has never smoked. He has never used smokeless tobacco. He reports current alcohol use. He reports that he does not use drugs.    Family History:  Patient's family history has been reviewed and found to be noncontributory.     Allergies:      Codeine    Home Medications:    Prior to Admission Medications       Prescriptions Last Dose Informant Patient Reported? Taking?    amoxicillin-clavulanate (AUGMENTIN) 875-125 MG per tablet   No No    Take 1 tablet by mouth 2 (Two) Times a Day.    bisacodyl 5 MG EC tablet   No No    Use as directed.    eszopiclone (Lunesta) 3 MG tablet   Yes No    Take 1 tablet by mouth Daily As Needed for Sleep. Take immediately before bedtime    FLUoxetine (PROzac) 40 MG capsule   Yes No    Take 40 mg by mouth Daily.    glipizide (GLUCOTROL) 5 MG tablet   Yes No    glipizide 5 mg tablet   TAKE 1/2 TABLET BY MOUTH TWO TIMES A DAY WITH MEALS FOR 90 DAYS    hydrOXYzine pamoate (VISTARIL) 50 MG capsule   No No    Take 1 capsule by mouth 3 (Three) Times a Day As Needed for Anxiety.    ketorolac (TORADOL) 10 MG tablet   No No    Take 1 tablet by mouth Every 6 (Six) Hours As Needed for Severe Pain.    Lactobacillus Rhamnosus, GG, (RA Probiotic Digestive Care) capsule   Yes No    Culturelle 10 billion cell capsule   Take 1 capsule every day by oral route for 30 days.    losartan (COZAAR) 50 MG tablet   Yes No    losartan 50 mg tablet   Take 1 tablet  "every day by oral route for 90 days.    ondansetron ODT (ZOFRAN-ODT) 4 MG disintegrating tablet   No No    Place 1 tablet on the tongue 4 (Four) Times a Day As Needed for Nausea.    polyethylene glycol (MIRALAX) 17 GM/SCOOP powder   No No    Mix 238g powder with 64 oz of clear liquid. Use as directed.    traMADol (ULTRAM) 50 MG tablet   No No    Take 1 tablet by mouth Every 6 (Six) Hours As Needed for Severe Pain .              Vital Signs:  Temp:  [98.9 °F (37.2 °C)] 98.9 °F (37.2 °C)  Heart Rate:  [] 77  Resp:  [20-22] 20  BP: (119-139)/(68-81) 134/75        01/29/25  2321   Weight: 122 kg (270 lb)     Body mass index is 35.62 kg/m².    Physical Exam:     Most recent vital Signs: /75   Pulse 77   Temp 98.9 °F (37.2 °C) (Oral)   Resp 20   Ht 185.4 cm (73\")   Wt 122 kg (270 lb)   SpO2 96%   BMI 35.62 kg/m²     Constitutional: Awake, alert  Eyes: PERRLA, sclerae anicteric, no conjunctival injection  HENT: NCAT, mucous membranes moist  Neck: Supple, no thyromegaly, no lymphadenopathy, trachea midline  Respiratory: Clear to auscultation bilaterally, nonlabored respirations   Cardiovascular: RRR, no murmurs, rubs, or gallops, palpable pedal pulses bilaterally  Gastrointestinal: Positive bowel sounds, soft, diffuse abdominal tenderness, mildly distended, NG tube in place  Musculoskeletal: trace bilateral ankle edema, no clubbing or cyanosis to extremities  Psychiatric: Appropriate affect, cooperative  Neurologic: Oriented x 3, speech clear  Skin: No rashes  Edited by: Abelardo Tomas DO at 1/30/2025 9148      Laboratory data:    I have reviewed the labs done in the emergency room.    Results from last 7 days   Lab Units 01/29/25  2348   SODIUM mmol/L 133*   POTASSIUM mmol/L 3.4*   CHLORIDE mmol/L 94*   CO2 mmol/L 25.1   BUN mg/dL 9   CREATININE mg/dL 1.40*   CALCIUM mg/dL 9.8   BILIRUBIN mg/dL 0.9   ALK PHOS U/L 78   ALT (SGPT) U/L 47*   AST (SGOT) U/L 32   GLUCOSE mg/dL 226*     Results from last " "7 days   Lab Units 01/29/25  2348   WBC 10*3/mm3 13.13*   HEMOGLOBIN g/dL 16.5   HEMATOCRIT % 48.4   PLATELETS 10*3/mm3 252         Results from last 7 days   Lab Units 01/30/25  0046 01/29/25  2348   HSTROP T ng/L 7 7             Results from last 7 days   Lab Units 01/29/25  2348   LIPASE U/L 14               Invalid input(s): \"USDES\", \"NITRITITE\", \"BACT\", \"EP\"    Pain Management Panel           No data to display                EKG:      EKG shows a sinus rhythm with a right bundle branch block nonspecific T wave changes    Radiology:    CT Abdomen Pelvis With Contrast    Addendum Date: 1/30/2025    ADDENDUM REPORT ADDENDUM: This report was discussed with Dr. Eduin Faria MD on Jan 30, 2025 00:58:00 EST. Authenticated and Electronically Signed by Johnnie Stevens MD on 01/30/2025 12:59:22 AM    Result Date: 1/30/2025  FINAL REPORT TECHNIQUE: null CLINICAL HISTORY: Epigastric and generalized abdominal pain, history of diverticulitis,, eval fo COMPARISON: null FINDINGS: CT abdomen and pelvis with contrast Comparison: None Findings: There is mild bibasilar atelectasis. There is mild hepatic steatosis. The gallbladder, pancreas, adrenal glands, and kidneys are unremarkable. There are calcified splenic granulomas. The stomach is mildly distended. The small bowel is mildly distended. There is transition to normal diameter small bowel within the right lower quadrant. The appendix is normal. There is mild colonic diverticulosis. There is no acute diverticulitis. There is no free fluid or free air. The aorta is normal in diameter. IVC is widely patent. There are no enlarged lymph nodes. There is no fracture or suspicious lytic or sclerotic lesion.     IMPRESSION: 1. Mildly distended stomach and small bowel with transition to normal diameter small bowel in the right lower quadrant consistent with partial small bowel obstruction, ileus, or gastroenteritis. 2. Mild colonic diverticulosis. 3. Mild hepatic steatosis. " Authenticated and Electronically Signed by Johnnie Stevens MD on 01/30/2025 12:55:13 AM     Assessment/Plan:      Partial small bowel obstruction  -- CT reads with possible gastroenteritis versus ileus versus PSBO.  They discussed this with surgery Dr. Vega will see the patient in the morning.  -- Active Treatments: N.p.o., IV fluids, consider NG tube if vomiting not controlled, Zofran, surgery consult        SIRS (systemic inflammatory response syndrome)  --Suspect related to GI pathology  -- Active Treatments: Rocephin, Flagyl  -- Pending Results: Follow-up blood cultures      Prerenal azotemia  -- Related to GI pathology resulting in hypovolemia  -- Active Treatments: IV fluids  -- Pending Results: serial labs            DVT Prophylaxis: Lovenox  Code Status: Full  Diet: N.p.o.  Risk assessment: High anticipate greater than two night stay          Edited by: Abelardo Tomas DO at 1/30/2025 0129           Advance Care Planning  ACP discussion was held with the patient during this visit. Patient does not have an advance directive, declines further assistance.           Abelardo Tomas DO  01/30/25  02:26 EST    Dictated utilizing Dragon dictation.      Electronically signed by Abelardo Tomas DO at 01/30/25 0227          Emergency Department Notes        Lissy Neal RN at 01/30/25 0028          Pt again desatted to 86% on 6 LPM NC. Pt converted to 10LPM simple mask with improvement in oxygenation.     Electronically signed by Lissy Neal RN at 01/30/25 0031       Lissy Neal RN at 01/30/25 0023          Noted pt to desat to 66% on room air. Pt placed on 6LPM NC. Noted oxygen saturation to increase to 93%. Provider aware.     Electronically signed by Lissy Neal RN at 01/30/25 0030       Casey Pool PA-C at 01/29/25 3661       Attestation signed by Eduin Faria MD at 01/30/25 6082        I personally discussed management/interpretation of the data with: Casey  "PATRICE Pool.  Personally visualized imaging and agree with plan of care for partial bowel obstruction versus ileus.  Agree with plan and documentation as above.  Eduin Faria MD 1/30/2025 03:57 EST                         Subjective  History of Present Illness:    Patient is a 54-year-old male presented for evaluation of diffuse abdominal pain, he is not endorsing a chest discomfort or shortness of breath, he is reporting abdominal pain, generalized in nature, mostly epigastric in nature.  No fevers, he denies any urinary symptoms, no testicular pain.  Sudden onset nausea vomiting abdominal pain several hours ago, he denies any traumatic injuries.  He is extremely uncomfortable appearing on arrival, tachycardic at a rate of 107.  No back or flank pain.  Normal bowel movements, no hematochezia no melena.  Patient is unable to describe the pain other than \"it hurts\".      Nurses Notes reviewed and agree, including vitals, allergies, social history and prior medical history.     REVIEW OF SYSTEMS: All systems reviewed and not pertinent unless noted.  Review of Systems   Constitutional:  Negative for fever.   Respiratory:  Negative for shortness of breath.    Cardiovascular:  Negative for chest pain.   Gastrointestinal:  Positive for abdominal pain, nausea and vomiting. Negative for diarrhea.   Musculoskeletal:  Negative for back pain.   All other systems reviewed and are negative.      Past Medical History:   Diagnosis Date    Diverticulitis     Myocardial infarction        Allergies:    Codeine      History reviewed. No pertinent surgical history.      Social History     Socioeconomic History    Marital status: Single   Tobacco Use    Smoking status: Never    Smokeless tobacco: Never   Vaping Use    Vaping status: Never Used   Substance and Sexual Activity    Alcohol use: Yes     Comment: occasionally    Drug use: No    Sexual activity: Defer         History reviewed. No pertinent family " "history.    Objective  Physical Exam:  /75   Pulse 77   Temp 98.9 °F (37.2 °C) (Oral)   Resp 20   Ht 185.4 cm (73\")   Wt 122 kg (270 lb)   SpO2 96%   BMI 35.62 kg/m²      Physical Exam  Vitals and nursing note reviewed.   Constitutional:       General: He is in acute distress.      Appearance: He is obese. He is ill-appearing. He is not toxic-appearing or diaphoretic.   HENT:      Head: Normocephalic and atraumatic.      Mouth/Throat:      Mouth: Mucous membranes are moist.      Pharynx: Oropharynx is clear.   Eyes:      Extraocular Movements: Extraocular movements intact.   Cardiovascular:      Rate and Rhythm: Regular rhythm. Tachycardia present.      Heart sounds: Normal heart sounds.   Pulmonary:      Effort: Pulmonary effort is normal. No respiratory distress.      Breath sounds: Normal breath sounds. No stridor. No wheezing, rhonchi or rales.   Abdominal:      General: Abdomen is protuberant. There is distension.      Palpations: Abdomen is soft.      Tenderness: There is generalized abdominal tenderness. There is guarding.      Hernia: No hernia is present.   Skin:     General: Skin is warm and dry.      Capillary Refill: Capillary refill takes less than 2 seconds.   Neurological:      General: No focal deficit present.      Mental Status: He is alert and oriented to person, place, and time.      Cranial Nerves: No cranial nerve deficit.   Psychiatric:         Mood and Affect: Mood is anxious.         Behavior: Behavior normal.               Procedures    ED Course:    ED Course as of 01/30/25 0150   Thu Jan 30, 2025   0026 WBC(!): 13.13 [JR]   0029 Lactate(!!): 2.6 [JR]   0031 Patient experienced acute desaturation after Dilaudid administration and placed on nasal cannula.  Lactic at 2.6, leukocytosis of 13, tachycardia, meeting SIRS criteria and blood cultures were obtained at this time, sepsis fluids ordered. [JR]      ED Course User Index  [JR] Casey Pool PA-C       Lab Results (last " 24 hours)       Procedure Component Value Units Date/Time    CBC & Differential [[id number]]  (Abnormal) Collected: 01/29/25 2348    Specimen: Blood Updated: 01/30/25 0026    Narrative:      The following orders were created for panel order CBC & Differential.  Procedure                               Abnormality         Status                     ---------                               -----------         ------                     CBC Auto Differential[[id number]]        Abnormal            Final result                 Please view results for these tests on the individual orders.    Comprehensive Metabolic Panel [[id number]]  (Abnormal) Collected: 01/29/25 2348    Specimen: Blood Updated: 01/30/25 0021     Glucose 226 mg/dL      Comment: Glucose >180, Hemoglobin A1C recommended.        BUN 9 mg/dL      Creatinine 1.40 mg/dL      Sodium 133 mmol/L      Potassium 3.4 mmol/L      Chloride 94 mmol/L      CO2 25.1 mmol/L      Calcium 9.8 mg/dL      Total Protein 7.1 g/dL      Albumin 4.5 g/dL      ALT (SGPT) 47 U/L      AST (SGOT) 32 U/L      Alkaline Phosphatase 78 U/L      Total Bilirubin 0.9 mg/dL      Globulin 2.6 gm/dL      A/G Ratio 1.7 g/dL      BUN/Creatinine Ratio 6.4     Anion Gap 13.9 mmol/L      eGFR 59.4 mL/min/1.73     Narrative:      GFR Categories in Chronic Kidney Disease (CKD)      GFR Category          GFR (mL/min/1.73)    Interpretation  G1                     90 or greater         Normal or high (1)  G2                      60-89                Mild decrease (1)  G3a                   45-59                Mild to moderate decrease  G3b                   30-44                Moderate to severe decrease  G4                    15-29                Severe decrease  G5                    14 or less           Kidney failure          (1)In the absence of evidence of kidney disease, neither GFR category G1 or G2 fulfill the criteria for CKD.    eGFR calculation 2021 CKD-EPI creatinine equation, which does not  include race as a factor    Lipase [[id number]]  (Normal) Collected: 01/29/25 2348    Specimen: Blood Updated: 01/30/25 0021     Lipase 14 U/L     Lactic Acid, Plasma [[id number]]  (Abnormal) Collected: 01/29/25 2348    Specimen: Blood Updated: 01/30/25 0028     Lactate 2.6 mmol/L     CBC Auto Differential [[id number]]  (Abnormal) Collected: 01/29/25 2348    Specimen: Blood Updated: 01/30/25 0026     WBC 13.13 10*3/mm3      RBC 5.36 10*6/mm3      Hemoglobin 16.5 g/dL      Hematocrit 48.4 %      MCV 90.3 fL      MCH 30.8 pg      MCHC 34.1 g/dL      RDW 12.5 %      RDW-SD 40.9 fl      MPV 10.2 fL      Platelets 252 10*3/mm3      Neutrophil % 76.7 %      Lymphocyte % 15.1 %      Monocyte % 6.5 %      Eosinophil % 0.9 %      Basophil % 0.5 %      Immature Grans % 0.3 %      Neutrophils, Absolute 10.07 10*3/mm3      Lymphocytes, Absolute 1.98 10*3/mm3      Monocytes, Absolute 0.86 10*3/mm3      Eosinophils, Absolute 0.12 10*3/mm3      Basophils, Absolute 0.06 10*3/mm3      Immature Grans, Absolute 0.04 10*3/mm3      nRBC 0.0 /100 WBC     C-reactive Protein [[id number]]  (Normal) Collected: 01/29/25 2348    Specimen: Blood Updated: 01/30/25 0031     C-Reactive Protein 0.50 mg/dL     High Sensitivity Troponin T [[id number]]  (Normal) Collected: 01/29/25 2348    Specimen: Blood Updated: 01/30/25 0033     HS Troponin T 7 ng/L     Narrative:      High Sensitive Troponin T Reference Range:  <14.0 ng/L- Negative Female for AMI  <22.0 ng/L- Negative Male for AMI  >=14 - Abnormal Female indicating possible myocardial injury.  >=22 - Abnormal Male indicating possible myocardial injury.   Clinicians would have to utilize clinical acumen, EKG, Troponin, and serial changes to determine if it is an Acute Myocardial Infarction or myocardial injury due to an underlying chronic condition.         Blood Culture - Blood, Hand, Left [[id number]] Collected: 01/30/25 0046    Specimen: Blood from Hand, Left Updated: 01/30/25 0055    High Sensitivity  Troponin T 1Hr [[id number]]  (Normal) Collected: 01/30/25 0046    Specimen: Blood Updated: 01/30/25 0119     HS Troponin T 7 ng/L      Troponin T Numeric Delta 0 ng/L     Narrative:      High Sensitive Troponin T Reference Range:  <14.0 ng/L- Negative Female for AMI  <22.0 ng/L- Negative Male for AMI  >=14 - Abnormal Female indicating possible myocardial injury.  >=22 - Abnormal Male indicating possible myocardial injury.   Clinicians would have to utilize clinical acumen, EKG, Troponin, and serial changes to determine if it is an Acute Myocardial Infarction or myocardial injury due to an underlying chronic condition.         Blood Culture - Blood, Arm, Right [[id number]] Collected: 01/30/25 0048    Specimen: Blood from Arm, Right Updated: 01/30/25 0055             CT Abdomen Pelvis With Contrast    Addendum Date: 1/30/2025    ADDENDUM REPORT ADDENDUM: This report was discussed with Dr. Eduin Faria MD on Jan 30, 2025 00:58:00 EST. Authenticated and Electronically Signed by Johnnie Stevens MD on 01/30/2025 12:59:22 AM    Result Date: 1/30/2025  FINAL REPORT TECHNIQUE: null CLINICAL HISTORY: Epigastric and generalized abdominal pain, history of diverticulitis,, eval fo COMPARISON: null FINDINGS: CT abdomen and pelvis with contrast Comparison: None Findings: There is mild bibasilar atelectasis. There is mild hepatic steatosis. The gallbladder, pancreas, adrenal glands, and kidneys are unremarkable. There are calcified splenic granulomas. The stomach is mildly distended. The small bowel is mildly distended. There is transition to normal diameter small bowel within the right lower quadrant. The appendix is normal. There is mild colonic diverticulosis. There is no acute diverticulitis. There is no free fluid or free air. The aorta is normal in diameter. IVC is widely patent. There are no enlarged lymph nodes. There is no fracture or suspicious lytic or sclerotic lesion.     Impression: IMPRESSION: 1. Mildly distended  stomach and small bowel with transition to normal diameter small bowel in the right lower quadrant consistent with partial small bowel obstruction, ileus, or gastroenteritis. 2. Mild colonic diverticulosis. 3. Mild hepatic steatosis. Authenticated and Electronically Signed by Johnnie Stevens MD on 01/30/2025 12:55:13 AM        MDM      Initial impression of presenting illness: 54-year-old male presented for evaluation of abdominal pain nausea vomiting sudden onset in nature.    DDX: includes but is not limited to: Appendicitis, gastritis, peptic ulcer disease, perforated diverticulitis, diverticulitis, sepsis, intra-abdominal infection, gallbladder abnormality, dehydration, viral GI illness, gastroenteritis, atypical ACS, bowel obstruction, among others    Patient arrives hemodynamically stable, afebrile, tachycardic at a rate of 107, mildly tachypneic, nonhypoxic with vitals interpreted by myself.     Pertinent features from physical exam: Abdomen is distended, patient has significant tenderness with guarding to the epigastric region and particularly but diffusely throughout the abdomen.  Negative CVA tenderness, lungs grossly clear, cardiac auscultation tachycardic rate regular rhythm..    Initial diagnostic plan: CBC CMP lactic acid lipase CRP troponin urinalysis EKG CT abdomen pelvis with contrast    Results from initial plan were reviewed and interpreted by me revealing CBC leukocytosis 13.3, lactic acidosis 2.6, troponin of 7, CRP normal.  CMP with hyperglycemia, creatinine of 1.4 concerning for mild acute kidney injury, sodium of 133, likely pseudohyponatremia.  Potassium 3.4.  Blood cultures x 2 pending, CT imaging concerning for partial small bowel obstruction versus ileus per radiology interpretation.  Blood cultures obtained and pending.  Troponin flat x 2, ACS not suspected.  CRP within normal limits.  KUB per my independent review reveals NG tube in appropriate position.  Radiology overread  pending.    Diagnostic information from other sources: Record reviewed    Interventions / Re-evaluation:   Medications   sodium chloride 0.9 % flush 10 mL (has no administration in time range)   sodium chloride 0.9 % bolus 1,400 mL (1,400 mL Intravenous New Bag 1/30/25 0055)   FLUoxetine (PROzac) capsule 40 mg (has no administration in time range)   hydrOXYzine pamoate (VISTARIL) capsule 50 mg (has no administration in time range)   ondansetron ODT (ZOFRAN-ODT) disintegrating tablet 4 mg (has no administration in time range)   sodium chloride 0.9 % flush 10 mL (has no administration in time range)   sodium chloride 0.9 % flush 10 mL (has no administration in time range)   sodium chloride 0.9 % infusion 40 mL (has no administration in time range)   acetaminophen (TYLENOL) tablet 650 mg (has no administration in time range)   calcium carbonate (TUMS) chewable tablet 500 mg (200 mg elemental) (has no administration in time range)   ondansetron ODT (ZOFRAN-ODT) disintegrating tablet 4 mg (has no administration in time range)     Or   ondansetron (ZOFRAN) injection 4 mg (has no administration in time range)   melatonin tablet 5 mg (has no administration in time range)   Pharmacy to Dose enoxaparin (LOVENOX) (has no administration in time range)   nitroglycerin (NITROSTAT) SL tablet 0.4 mg (has no administration in time range)   dextrose (GLUTOSE) oral gel 15 g (has no administration in time range)   dextrose (D50W) (25 g/50 mL) IV injection 25 g (has no administration in time range)   glucagon (GLUCAGEN) injection 1 mg (has no administration in time range)   insulin regular (humuLIN R,novoLIN R) injection 2-9 Units (has no administration in time range)   Morphine sulfate (PF) injection 1 mg (has no administration in time range)   Potassium Replacement - Follow Nurse / BPA Driven Protocol (has no administration in time range)   Magnesium Standard Dose Replacement - Follow Nurse / BPA Driven Protocol (has no administration  in time range)   Phosphorus Replacement - Follow Nurse / BPA Driven Protocol (has no administration in time range)   Calcium Replacement - Follow Nurse / BPA Driven Protocol (has no administration in time range)   dextrose 5 % and sodium chloride 0.45 % with KCl 20 mEq/L infusion (has no administration in time range)   cefTRIAXone (ROCEPHIN) 2,000 mg in sodium chloride 0.9 % 100 mL IVPB-VTB (has no administration in time range)   metroNIDAZOLE (FLAGYL) IVPB 500 mg (has no administration in time range)   HYDROmorphone (DILAUDID) injection 1 mg (1 mg Intravenous Given 1/30/25 0010)   ondansetron (ZOFRAN) injection 4 mg (4 mg Intravenous Given 1/30/25 0009)   sodium chloride 0.9 % bolus 1,000 mL (0 mL Intravenous Stopped 1/30/25 0054)   iopamidol (ISOVUE-300) 61 % injection 100 mL (100 mL Intravenous Given 1/30/25 0010)   NG tube placed by nursing staff, significant output obtained.    Results/clinical rationale were discussed with patient at bedside, he is agreeable to admission for further consultation and evaluation.    Consultations/Discussion of results with other physicians: Discussed with Dr. Vega of general surgery, recommended NG tube, admit to medicine, will consult in the a.m.,  Then discussed with hospitalist for admission, Dr. Tomas.    Disposition plan: Admit to medicine.  -----    Final diagnoses:   Acute kidney injury   Small bowel obstruction   Hyperglycemia          Casey Pool PA-C  01/30/25 0153      Electronically signed by Eduin Faria MD at 01/30/25 0358       Vital Signs (last day)       Date/Time Temp Temp src Pulse Resp BP Patient Position SpO2    01/30/25 0542 -- -- 80 -- 120/66 -- 96    01/30/25 0442 -- -- 75 -- 129/69 -- 96    01/30/25 0342 -- -- 83 -- 131/70 -- 95    01/30/25 0246 -- -- 77 -- 123/74 -- 96    01/30/25 0216 -- -- 77 -- 123/74 -- 98    01/30/25 0144 -- -- 77 20 134/75 -- 96    01/30/25 0114 -- -- 80 -- 139/75 -- 97    01/30/25 0026 -- -- 75 -- -- -- 88    01/30/25  0022 -- -- 84 -- -- -- 66    01/30/25 0016 -- -- 76 -- 123/68 -- 84    01/29/25 2346 -- -- 85 -- 123/78 -- 97    01/29/25 2321 98.9 (37.2) Oral 107 22 119/81 Sitting 97          Current Facility-Administered Medications   Medication Dose Route Frequency Provider Last Rate Last Admin    acetaminophen (TYLENOL) tablet 650 mg  650 mg Oral Q4H PRN Abelardo Tomas DO        calcium carbonate (TUMS) chewable tablet 500 mg (200 mg elemental)  2 tablet Oral BID PRN Abelardo Tomas DO        Calcium Replacement - Follow Nurse / BPA Driven Protocol   Not Applicable PRN Abelardo Tomas DO        cefTRIAXone (ROCEPHIN) 2,000 mg in sodium chloride 0.9 % 100 mL IVPB-VTB  2,000 mg Intravenous Q24H Abelardo Tomas DO   Stopped at 01/30/25 0419    dextrose (D50W) (25 g/50 mL) IV injection 25 g  25 g Intravenous Q15 Min PRN Abelardo Tomas DO        dextrose (GLUTOSE) oral gel 15 g  15 g Oral Q15 Min PRN Abelardo Tomas,         dextrose 5 % and sodium chloride 0.45 % with KCl 20 mEq/L infusion  100 mL/hr Intravenous Continuous Abelardo Tomas  mL/hr at 01/30/25 0348 100 mL/hr at 01/30/25 0348    Enoxaparin Sodium (LOVENOX) syringe 40 mg  40 mg Subcutaneous Q24H Abelardo Tomas DO        FLUoxetine (PROzac) capsule 40 mg  40 mg Oral Daily Abelardo Tomas DO        glucagon (GLUCAGEN) injection 1 mg  1 mg Intramuscular Q15 Min PRN Abelardo Tomas DO        hydrOXYzine pamoate (VISTARIL) capsule 50 mg  50 mg Oral TID PRN Abelardo Tomas,         insulin regular (humuLIN R,novoLIN R) injection 2-9 Units  2-9 Units Subcutaneous Q6H Abelardo Tomas DO        Magnesium Standard Dose Replacement - Follow Nurse / BPA Driven Protocol   Not Applicable PRN Abelardo Tomas,         melatonin tablet 5 mg  5 mg Oral Nightly Genoveva, Abelardo Arsen, DO        metroNIDAZOLE (FLAGYL) IVPB 500 mg  500 mg Intravenous Q8H Abelardo Tomas,    Stopped at 01/30/25 0717    Morphine  sulfate (PF) injection 1 mg  1 mg Intravenous Q2H PRN Abelardo Tomas, DO   1 mg at 01/30/25 0501    nitroglycerin (NITROSTAT) SL tablet 0.4 mg  0.4 mg Sublingual Q5 Min PRN Abelardo Tomas,         ondansetron ODT (ZOFRAN-ODT) disintegrating tablet 4 mg  4 mg Oral Q6H PRN Abelardo Toams,         Or    ondansetron (ZOFRAN) injection 4 mg  4 mg Intravenous Q6H PRN Abelardo Tomas,         ondansetron ODT (ZOFRAN-ODT) disintegrating tablet 4 mg  4 mg Translingual 4x Daily PRN Abelardo Tomas,         Pharmacy to Dose enoxaparin (LOVENOX)   Not Applicable Continuous PRN Abelardo Tomas DO        Phosphorus Replacement - Follow Nurse / BPA Driven Protocol   Not Applicable PRN Abelardo Tomas,         Potassium Replacement - Follow Nurse / BPA Driven Protocol   Not Applicable PRN Abelardo Tomas,         sodium chloride 0.9 % flush 10 mL  10 mL Intravenous PRN Abelardo Tomas,         sodium chloride 0.9 % flush 10 mL  10 mL Intravenous Q12H Abelardo Tomas,    10 mL at 01/30/25 0258    sodium chloride 0.9 % flush 10 mL  10 mL Intravenous PRN Abelardo Tomas,         sodium chloride 0.9 % infusion 40 mL  40 mL Intravenous PRN Abelardo Tomas,          Current Outpatient Medications   Medication Sig Dispense Refill    amoxicillin-clavulanate (AUGMENTIN) 875-125 MG per tablet Take 1 tablet by mouth 2 (Two) Times a Day. 14 tablet 0    bisacodyl 5 MG EC tablet Use as directed. 4 tablet 0    eszopiclone (Lunesta) 3 MG tablet Take 1 tablet by mouth Daily As Needed for Sleep. Take immediately before bedtime      FLUoxetine (PROzac) 40 MG capsule Take 40 mg by mouth Daily.      glipizide (GLUCOTROL) 5 MG tablet glipizide 5 mg tablet   TAKE 1/2 TABLET BY MOUTH TWO TIMES A DAY WITH MEALS FOR 90 DAYS      hydrOXYzine pamoate (VISTARIL) 50 MG capsule Take 1 capsule by mouth 3 (Three) Times a Day As Needed for Anxiety. 20 capsule 0    ketorolac (TORADOL) 10 MG  tablet Take 1 tablet by mouth Every 6 (Six) Hours As Needed for Severe Pain. 15 tablet 0    Lactobacillus Rhamnosus, GG, ( Probiotic Digestive Care) capsule Culturelle 10 billion cell capsule   Take 1 capsule every day by oral route for 30 days.      losartan (COZAAR) 50 MG tablet losartan 50 mg tablet   Take 1 tablet every day by oral route for 90 days.      ondansetron ODT (ZOFRAN-ODT) 4 MG disintegrating tablet Place 1 tablet on the tongue 4 (Four) Times a Day As Needed for Nausea. 20 tablet 0    polyethylene glycol (MIRALAX) 17 GM/SCOOP powder Mix 238g powder with 64 oz of clear liquid. Use as directed. 238 g 0    traMADol (ULTRAM) 50 MG tablet Take 1 tablet by mouth Every 6 (Six) Hours As Needed for Severe Pain . 12 tablet 0     Lab Results (last 24 hours)       Procedure Component Value Units Date/Time    Urinalysis With Microscopic If Indicated (No Culture) - Urine, Clean Catch [[id number]]  (Abnormal) Collected: 01/30/25 0424    Specimen: Urine, Clean Catch Updated: 01/30/25 0430     Color, UA Yellow     Appearance, UA Clear     pH, UA 6.0     Specific Gravity, UA >=1.030     Glucose,  mg/dL (Trace)     Ketones, UA 15 mg/dL (1+)     Bilirubin, UA Negative     Blood, UA Negative     Protein, UA Negative     Leuk Esterase, UA Negative     Nitrite, UA Negative     Urobilinogen, UA 1.0 E.U./dL    Narrative:      Urine microscopic not indicated.    STAT Lactic Acid, Reflex [[id number]]  (Normal) Collected: 01/30/25 0240    Specimen: Blood Updated: 01/30/25 0259     Lactate 1.2 mmol/L     POC Glucose Once [[id number]]  (Abnormal) Collected: 01/30/25 0242    Specimen: Blood Updated: 01/30/25 0246     Glucose 142 mg/dL      Comment: Serial Number: [id number]:  [id number]       High Sensitivity Troponin T 1Hr [[id number]]  (Normal) Collected: 01/30/25 0046    Specimen: Blood Updated: 01/30/25 0119     HS Troponin T 7 ng/L      Troponin T Numeric Delta 0 ng/L     Narrative:      High Sensitive Troponin T  Reference Range:  <14.0 ng/L- Negative Female for AMI  <22.0 ng/L- Negative Male for AMI  >=14 - Abnormal Female indicating possible myocardial injury.  >=22 - Abnormal Male indicating possible myocardial injury.   Clinicians would have to utilize clinical acumen, EKG, Troponin, and serial changes to determine if it is an Acute Myocardial Infarction or myocardial injury due to an underlying chronic condition.         Blood Culture - Blood, Arm, Right [[id number]] Collected: 01/30/25 0048    Specimen: Blood from Arm, Right Updated: 01/30/25 0055    Blood Culture - Blood, Hand, Left [[id number]] Collected: 01/30/25 0046    Specimen: Blood from Hand, Left Updated: 01/30/25 0055    High Sensitivity Troponin T [[id number]]  (Normal) Collected: 01/29/25 2348    Specimen: Blood Updated: 01/30/25 0033     HS Troponin T 7 ng/L     Narrative:      High Sensitive Troponin T Reference Range:  <14.0 ng/L- Negative Female for AMI  <22.0 ng/L- Negative Male for AMI  >=14 - Abnormal Female indicating possible myocardial injury.  >=22 - Abnormal Male indicating possible myocardial injury.   Clinicians would have to utilize clinical acumen, EKG, Troponin, and serial changes to determine if it is an Acute Myocardial Infarction or myocardial injury due to an underlying chronic condition.         C-reactive Protein [[id number]]  (Normal) Collected: 01/29/25 2348    Specimen: Blood Updated: 01/30/25 0031     C-Reactive Protein 0.50 mg/dL     Lactic Acid, Plasma [[id number]]  (Abnormal) Collected: 01/29/25 2348    Specimen: Blood Updated: 01/30/25 0028     Lactate 2.6 mmol/L     CBC & Differential [[id number]]  (Abnormal) Collected: 01/29/25 2348    Specimen: Blood Updated: 01/30/25 0026    Narrative:      The following orders were created for panel order CBC & Differential.  Procedure                               Abnormality         Status                     ---------                               -----------         ------                      CBC Auto Differential[[id number]]        Abnormal            Final result                 Please view results for these tests on the individual orders.    CBC Auto Differential [[id number]]  (Abnormal) Collected: 01/29/25 2348    Specimen: Blood Updated: 01/30/25 0026     WBC 13.13 10*3/mm3      RBC 5.36 10*6/mm3      Hemoglobin 16.5 g/dL      Hematocrit 48.4 %      MCV 90.3 fL      MCH 30.8 pg      MCHC 34.1 g/dL      RDW 12.5 %      RDW-SD 40.9 fl      MPV 10.2 fL      Platelets 252 10*3/mm3      Neutrophil % 76.7 %      Lymphocyte % 15.1 %      Monocyte % 6.5 %      Eosinophil % 0.9 %      Basophil % 0.5 %      Immature Grans % 0.3 %      Neutrophils, Absolute 10.07 10*3/mm3      Lymphocytes, Absolute 1.98 10*3/mm3      Monocytes, Absolute 0.86 10*3/mm3      Eosinophils, Absolute 0.12 10*3/mm3      Basophils, Absolute 0.06 10*3/mm3      Immature Grans, Absolute 0.04 10*3/mm3      nRBC 0.0 /100 WBC     Comprehensive Metabolic Panel [[id number]]  (Abnormal) Collected: 01/29/25 2348    Specimen: Blood Updated: 01/30/25 0021     Glucose 226 mg/dL      Comment: Glucose >180, Hemoglobin A1C recommended.        BUN 9 mg/dL      Creatinine 1.40 mg/dL      Sodium 133 mmol/L      Potassium 3.4 mmol/L      Chloride 94 mmol/L      CO2 25.1 mmol/L      Calcium 9.8 mg/dL      Total Protein 7.1 g/dL      Albumin 4.5 g/dL      ALT (SGPT) 47 U/L      AST (SGOT) 32 U/L      Alkaline Phosphatase 78 U/L      Total Bilirubin 0.9 mg/dL      Globulin 2.6 gm/dL      A/G Ratio 1.7 g/dL      BUN/Creatinine Ratio 6.4     Anion Gap 13.9 mmol/L      eGFR 59.4 mL/min/1.73     Narrative:      GFR Categories in Chronic Kidney Disease (CKD)      GFR Category          GFR (mL/min/1.73)    Interpretation  G1                     90 or greater         Normal or high (1)  G2                      60-89                Mild decrease (1)  G3a                   45-59                Mild to moderate decrease  G3b                   30-44                 Moderate to severe decrease  G4                    15-29                Severe decrease  G5                    14 or less           Kidney failure          (1)In the absence of evidence of kidney disease, neither GFR category G1 or G2 fulfill the criteria for CKD.    eGFR calculation 2021 CKD-EPI creatinine equation, which does not include race as a factor    Lipase [[id number]]  (Normal) Collected: 01/29/25 2348    Specimen: Blood Updated: 01/30/25 0021     Lipase 14 U/L     [place] Draw [[id number]] Collected: 01/29/25 2348    Specimen: Blood Updated: 01/30/25 0000    Narrative:      The following orders were created for panel order [place] Draw.  Procedure                               Abnormality         Status                     ---------                               -----------         ------                     Green Top (Gel)[[id number]]                                  Final result               Lavender Top[[id number]]                                     Final result               Gold Top - SST[[id number]]                                   Final result               Light Blue Top[[id number]]                                   Final result                 Please view results for these tests on the individual orders.    Green Top (Gel) [[id number]] Collected: 01/29/25 2348    Specimen: Blood Updated: 01/30/25 0000     Extra Tube Hold for add-ons.     Comment: Auto resulted.       Lavender Top [[id number]] Collected: 01/29/25 2348    Specimen: Blood Updated: 01/30/25 0000     Extra Tube hold for add-on     Comment: Auto resulted       Gold Top - SST [[id number]] Collected: 01/29/25 2348    Specimen: Blood Updated: 01/30/25 0000     Extra Tube Hold for add-ons.     Comment: Auto resulted.       Light Blue Top [[id number]] Collected: 01/29/25 2348    Specimen: Blood Updated: 01/30/25 0000     Extra Tube Hold for add-ons.     Comment: Auto resulted             Imaging Results (Last 24 Hours)       Procedure Component  Value Units Date/Time    XR Abdomen 1 View [[id number]] Collected: 01/30/25 0234     Updated: 01/30/25 0236    Narrative:      FINAL REPORT    TECHNIQUE:  null    CLINICAL HISTORY:  NG tube inserted    COMPARISON:  null    FINDINGS:  1 view abdomen    Comparison: CT/SR - CT ABDOMEN PELVIS W CONTRAST - 1/30/25 00:07 EST    Findings:    NG tube is in the stomach.      Impression:      Impression:    NG tube is in the stomach.    Authenticated and Electronically Signed by Johnnie Stevens MD on  01/30/2025 02:34:52 AM    CT Abdomen Pelvis With Contrast [[id number]] Collected: 01/30/25 0055     Updated: 01/30/25 0101    Addenda:        ADDENDUM REPORT    ADDENDUM:  This report was discussed with Dr. Eduin Faria MD on Jan 30, 2025   00:58:00 EST.    Authenticated and Electronically Signed by Johnnie Stevens MD on  01/30/2025 12:59:22 AM  Signed: 01/30/25 0059 by Johnnie Stevens MD    Narrative:      FINAL REPORT    TECHNIQUE:  null    CLINICAL HISTORY:  Epigastric and generalized abdominal pain, history of  diverticulitis,, eval fo    COMPARISON:  null    FINDINGS:  CT abdomen and pelvis with contrast    Comparison: None    Findings:    There is mild bibasilar atelectasis.    There is mild hepatic steatosis. The gallbladder, pancreas, adrenal glands, and kidneys are unremarkable. There are calcified splenic granulomas.    The stomach is mildly distended. The small bowel is mildly distended. There is transition to normal diameter small bowel within the right lower quadrant. The appendix is normal. There is mild colonic diverticulosis. There is no acute diverticulitis.   There is no free fluid or free air.    The aorta is normal in diameter. IVC is widely patent. There are no enlarged lymph nodes. There is no fracture or suspicious lytic or sclerotic lesion.      Impression:      IMPRESSION:    1. Mildly distended stomach and small bowel with transition to normal diameter small bowel in the right lower quadrant  consistent with partial small bowel obstruction, ileus, or gastroenteritis.    2. Mild colonic diverticulosis.    3. Mild hepatic steatosis.    Authenticated and Electronically Signed by Johnnie Stevens MD on  01/30/2025 12:55:13 AM          Physician Progress Notes (last 24 hours)  Notes from 01/29/25 0642 through 01/30/25 0642   No notes of this type exist for this encounter.       Consult Notes (last 24 hours)  Notes from 01/29/25 0642 through 01/30/25 0642   No notes of this type exist for this encounter.

## 2025-01-30 NOTE — H&P
[facility]   HISTORY AND PHYSICAL      Name:  Dwayne Denver Short   Age:  54 y.o.  Sex:  male  :  1970  MRN:  [id number]   Visit Number:  [id number]  Admission Date:  2025  Date Of Service:  25  Primary Care Physician:  Taniya Moise DO    Chief Complaint:     Abdominal pain    History Of Presenting Illness:      54-year-old male.  Past medical history: Diverticulitis, myocardial infarction, prediabetes.  Chief complaint vomiting.  Presents following a 2-hours history of abdominal tenderness associated with multiple episodes of vomiting.  Pain was sharp and mid abdominal. Was given Dilaudid in the ED and had desaturated his oxygen levels.  Was also noted to meet SIRS criteria.  Case was discussed with Dr. Vega who recommended medical admission and that he would see the patient in the morning. Reports is feeling better since starting on the NG tube.  Full code.    Pertinent findings: Highly sensitive troponin 7, sodium 133, potassium 3.4, creatinine 1.4 on a normal baseline, lactate 2.6, blood glucose 226, ALT 47, lipase 14, WBC 13.13, blood cultures pending, CT of the abdomen pelvis showing a mildly distended stomach and small bowel with a transition point in the small bowel on the right lower quadrant consistent with partial small bowel obstruction ileus or gastroenteritis, EKG shows a sinus rhythm with a right bundle branch block nonspecific T wave changes    ED Medications:    Medications   sodium chloride 0.9 % flush 10 mL (has no administration in time range)   sodium chloride 0.9 % bolus 1,400 mL (1,400 mL Intravenous New Bag 25 0055)   HYDROmorphone (DILAUDID) injection 1 mg (1 mg Intravenous Given 25 0010)   ondansetron (ZOFRAN) injection 4 mg (4 mg Intravenous Given 25 0009)   sodium chloride 0.9 % bolus 1,000 mL (0 mL Intravenous Stopped 25 0054)   iopamidol (ISOVUE-300) 61 % injection 100 mL (100 mL Intravenous Given 25  0010)         Edited by: Abelardo Tomas DO at 1/30/2025 0222     Review Of Systems:    All systems were reviewed and negative except as mentioned in history of presenting illness, assessment and plan.    Past Medical History: Patient  has a past medical history of Diverticulitis and Myocardial infarction.    Past Surgical History: Patient  has no past surgical history on file.    Social History: Patient  reports that he has never smoked. He has never used smokeless tobacco. He reports current alcohol use. He reports that he does not use drugs.    Family History:  Patient's family history has been reviewed and found to be noncontributory.     Allergies:      Codeine    Home Medications:    Prior to Admission Medications       Prescriptions Last Dose Informant Patient Reported? Taking?    amoxicillin-clavulanate (AUGMENTIN) 875-125 MG per tablet   No No    Take 1 tablet by mouth 2 (Two) Times a Day.    bisacodyl 5 MG EC tablet   No No    Use as directed.    eszopiclone (Lunesta) 3 MG tablet   Yes No    Take 1 tablet by mouth Daily As Needed for Sleep. Take immediately before bedtime    FLUoxetine (PROzac) 40 MG capsule   Yes No    Take 40 mg by mouth Daily.    glipizide (GLUCOTROL) 5 MG tablet   Yes No    glipizide 5 mg tablet   TAKE 1/2 TABLET BY MOUTH TWO TIMES A DAY WITH MEALS FOR 90 DAYS    hydrOXYzine pamoate (VISTARIL) 50 MG capsule   No No    Take 1 capsule by mouth 3 (Three) Times a Day As Needed for Anxiety.    ketorolac (TORADOL) 10 MG tablet   No No    Take 1 tablet by mouth Every 6 (Six) Hours As Needed for Severe Pain.    Lactobacillus Rhamnosus, GG, ( Probiotic Digestive Care) capsule   Yes No    Culturelle 10 billion cell capsule   Take 1 capsule every day by oral route for 30 days.    losartan (COZAAR) 50 MG tablet   Yes No    losartan 50 mg tablet   Take 1 tablet every day by oral route for 90 days.    ondansetron ODT (ZOFRAN-ODT) 4 MG disintegrating tablet   No No    Place 1 tablet on the  "tongue 4 (Four) Times a Day As Needed for Nausea.    polyethylene glycol (MIRALAX) 17 GM/SCOOP powder   No No    Mix 238g powder with 64 oz of clear liquid. Use as directed.    traMADol (ULTRAM) 50 MG tablet   No No    Take 1 tablet by mouth Every 6 (Six) Hours As Needed for Severe Pain .              Vital Signs:  Temp:  [98.9 °F (37.2 °C)] 98.9 °F (37.2 °C)  Heart Rate:  [] 77  Resp:  [20-22] 20  BP: (119-139)/(68-81) 134/75        01/29/25  2321   Weight: 122 kg (270 lb)     Body mass index is 35.62 kg/m².    Physical Exam:     Most recent vital Signs: /75   Pulse 77   Temp 98.9 °F (37.2 °C) (Oral)   Resp 20   Ht 185.4 cm (73\")   Wt 122 kg (270 lb)   SpO2 96%   BMI 35.62 kg/m²     Constitutional: Awake, alert  Eyes: PERRLA, sclerae anicteric, no conjunctival injection  HENT: NCAT, mucous membranes moist  Neck: Supple, no thyromegaly, no lymphadenopathy, trachea midline  Respiratory: Clear to auscultation bilaterally, nonlabored respirations   Cardiovascular: RRR, no murmurs, rubs, or gallops, palpable pedal pulses bilaterally  Gastrointestinal: Positive bowel sounds, soft, diffuse abdominal tenderness, mildly distended, NG tube in place  Musculoskeletal: trace bilateral ankle edema, no clubbing or cyanosis to extremities  Psychiatric: Appropriate affect, cooperative  Neurologic: Oriented x 3, speech clear  Skin: No rashes  Edited by: Abelardo Tomas DO at 1/30/2025 0226      Laboratory data:    I have reviewed the labs done in the emergency room.    Results from last 7 days   Lab Units 01/29/25  2348   SODIUM mmol/L 133*   POTASSIUM mmol/L 3.4*   CHLORIDE mmol/L 94*   CO2 mmol/L 25.1   BUN mg/dL 9   CREATININE mg/dL 1.40*   CALCIUM mg/dL 9.8   BILIRUBIN mg/dL 0.9   ALK PHOS U/L 78   ALT (SGPT) U/L 47*   AST (SGOT) U/L 32   GLUCOSE mg/dL 226*     Results from last 7 days   Lab Units 01/29/25  2348   WBC 10*3/mm3 13.13*   HEMOGLOBIN g/dL 16.5   HEMATOCRIT % 48.4   PLATELETS 10*3/mm3 252      " "   Results from last 7 days   Lab Units 01/30/25  0046 01/29/25  2348   HSTROP T ng/L 7 7             Results from last 7 days   Lab Units 01/29/25  2348   LIPASE U/L 14               Invalid input(s): \"USDES\", \"NITRITITE\", \"BACT\", \"EP\"    Pain Management Panel           No data to display                EKG:      EKG shows a sinus rhythm with a right bundle branch block nonspecific T wave changes    Radiology:    CT Abdomen Pelvis With Contrast    Addendum Date: 1/30/2025    ADDENDUM REPORT ADDENDUM: This report was discussed with Dr. Eduin Faria MD on Jan 30, 2025 00:58:00 EST. Authenticated and Electronically Signed by Johnnie Stevens MD on 01/30/2025 12:59:22 AM    Result Date: 1/30/2025  FINAL REPORT TECHNIQUE: null CLINICAL HISTORY: Epigastric and generalized abdominal pain, history of diverticulitis,, eval fo COMPARISON: null FINDINGS: CT abdomen and pelvis with contrast Comparison: None Findings: There is mild bibasilar atelectasis. There is mild hepatic steatosis. The gallbladder, pancreas, adrenal glands, and kidneys are unremarkable. There are calcified splenic granulomas. The stomach is mildly distended. The small bowel is mildly distended. There is transition to normal diameter small bowel within the right lower quadrant. The appendix is normal. There is mild colonic diverticulosis. There is no acute diverticulitis. There is no free fluid or free air. The aorta is normal in diameter. IVC is widely patent. There are no enlarged lymph nodes. There is no fracture or suspicious lytic or sclerotic lesion.     IMPRESSION: 1. Mildly distended stomach and small bowel with transition to normal diameter small bowel in the right lower quadrant consistent with partial small bowel obstruction, ileus, or gastroenteritis. 2. Mild colonic diverticulosis. 3. Mild hepatic steatosis. Authenticated and Electronically Signed by Johnnie Stevens MD on 01/30/2025 12:55:13 AM     Assessment/Plan:      Partial small bowel " obstruction  -- CT reads with possible gastroenteritis versus ileus versus PSBO.  They discussed this with surgery Dr. Vega will see the patient in the morning.  -- Active Treatments: N.p.o., IV fluids, consider NG tube if vomiting not controlled, Zofran, surgery consult        SIRS (systemic inflammatory response syndrome)  --Suspect related to GI pathology  -- Active Treatments: Rocephin, Flagyl  -- Pending Results: Follow-up blood cultures      Prerenal azotemia  -- Related to GI pathology resulting in hypovolemia  -- Active Treatments: IV fluids  -- Pending Results: serial labs            DVT Prophylaxis: Lovenox  Code Status: Full  Diet: N.p.o.  Risk assessment: High anticipate greater than two night stay          Edited by: Abelardo Tomas DO at 1/30/2025 0129           Advance Care Planning   ACP discussion was held with the patient during this visit. Patient does not have an advance directive, declines further assistance.           Abelardo Tomas DO  01/30/25  02:26 EST    Dictated utilizing Dragon dictation.

## 2025-01-30 NOTE — DISCHARGE SUMMARY
[facility]   DISCHARGE SUMMARY      Name:  Dwayne Denver Short   Age:  54 y.o.  Sex:  male  :  1970  MRN:  [id number]   Visit Number:  [id number]    Admission Date:  2025  Date of Discharge:  2025  Primary Care Physician:  Taniya Moise, DO    Discharge Diagnoses:     Intractable nausea vomiting with partial small bowel obstruction, POA  Acute kidney injury, POA  Prediabetes  History of diverticulitis    Problem List:     Active Hospital Problems    Diagnosis  POA    **Partial small bowel obstruction [K56.600]  Yes    Prerenal azotemia [R79.89]  Yes      Resolved Hospital Problems   No resolved problems to display.     Presenting Problem:    Chief Complaint   Patient presents with    Abdominal Pain      Consults:     Consulting Physician(s)                     None              Procedures Performed:        History of presenting illness/Hospital Course:    Mr. Solitario is a pleasant 54-year-old male with pertinent past medical history of diverticulitis, MI, prediabetes who presented with a 2-hour history of abdominal tenderness with multiple episodes of vomiting.  Patient recently initiated on Mounjaro.  No prior abdominal surgeries.  Denied fever or chills.    Patient presented to emergency department, upon arrival slightly tachycardic shortly thereafter required oxygen due to pain medication administration.  Pertinent labs and imaging noted initial lactate 2.6 which resolved with repeat, creatinine 1.4, WBC 13, blood cultures obtained x 2, CT abdomen pelvis noting mildly distended stomach,consistent with partial small bowel obstruction ileus or gastroenteritis.  NG tube placed.  IV fluids initiated.  General surgeon Dr. Vega consulted.  Labs normalized.  Per Dr. Vega overall improvement noted after NG tube placed, likely side effect from recent GLP 1 inhibitor for weight loss, do not feel as if this is bowel obstruction, discontinue NG tube, ambulate,  advance diet as tolerated, discontinue Mounjaro at this time.  No surgical intervention necessary.  NG tube discontinued and diet advanced.  Patient tolerated well.  Advised to follow-up with PCP in 1 week.  Strict return precautions given.    Vital Signs:    Temp:  [98.9 °F (37.2 °C)] 98.9 °F (37.2 °C)  Heart Rate:  [] 66  Resp:  [13-22] 13  BP: (114-139)/(65-81) 130/73    Physical Exam:    General Appearance:  Alert and cooperative.  Well-appearing middle-age male.   Head:  Atraumatic and normocephalic.   Eyes: Conjunctivae and sclerae normal, no icterus. No pallor.   Ears:  Ears with no abnormalities noted.   Throat: No oral lesions, no thrush, oral mucosa moist.   Neck: Supple, trachea midline, no thyromegaly.   Back:   No kyphoscoliosis present. No tenderness to palpation.   Lungs:   Breath sounds heard bilaterally equally.  No crackles or wheezing. No Pleural rub or bronchial breathing.   Heart:  Normal S1 and S2, no murmur, no gallop, no rub. No JVD.   Abdomen:   Normal bowel sounds, no masses, no organomegaly. Soft, nontender, nondistended, no rebound tenderness.   Extremities: Supple, no edema, no cyanosis, no clubbing.   Pulses: Pulses palpable bilaterally.   Skin: No bleeding or rash.   Neurologic: Alert and oriented x 3. No facial asymmetry. Moves all four limbs. No tremors.     Pertinent Lab Results:     Results from last 7 days   Lab Units 01/30/25  0905 01/29/25  2348   SODIUM mmol/L 138 133*   POTASSIUM mmol/L 3.9 3.4*   CHLORIDE mmol/L 104 94*   CO2 mmol/L 24.8 25.1   BUN mg/dL 8 9   CREATININE mg/dL 0.97 1.40*   CALCIUM mg/dL 8.4* 9.8   BILIRUBIN mg/dL 0.5 0.9   ALK PHOS U/L 70 78   ALT (SGPT) U/L 37 47*   AST (SGOT) U/L 22 32   GLUCOSE mg/dL 151* 226*     Results from last 7 days   Lab Units 01/30/25  0905 01/29/25  2348   WBC 10*3/mm3 12.51* 13.13*   HEMOGLOBIN g/dL 15.2 16.5   HEMATOCRIT % 43.9 48.4   PLATELETS 10*3/mm3 216 252         Results from last 7 days   Lab Units 01/30/25  0046  01/29/25  2348   HSTROP T ng/L 7 7             Results from last 7 days   Lab Units 01/29/25  2348   LIPASE U/L 14               Pertinent Radiology Results:    Imaging Results (All)       Procedure Component Value Units Date/Time    XR Abdomen 1 View [[id number]] Collected: 01/30/25 0234     Updated: 01/30/25 0236    Narrative:      FINAL REPORT    TECHNIQUE:  null    CLINICAL HISTORY:  NG tube inserted    COMPARISON:  null    FINDINGS:  1 view abdomen    Comparison: CT/SR - CT ABDOMEN PELVIS W CONTRAST - 1/30/25 00:07 EST    Findings:    NG tube is in the stomach.      Impression:      Impression:    NG tube is in the stomach.    Authenticated and Electronically Signed by Johnnie Stevens MD on  01/30/2025 02:34:52 AM    CT Abdomen Pelvis With Contrast [[id number]] Collected: 01/30/25 0055     Updated: 01/30/25 0101    Addenda:        ADDENDUM REPORT    ADDENDUM:  This report was discussed with Dr. Eduin Faria MD on Jan 30, 2025   00:58:00 EST.    Authenticated and Electronically Signed by Johnnie Stevens MD on  01/30/2025 12:59:22 AM  Signed: 01/30/25 0059 by Johnnie Stevens MD    Narrative:      FINAL REPORT    TECHNIQUE:  null    CLINICAL HISTORY:  Epigastric and generalized abdominal pain, history of  diverticulitis,, eval fo    COMPARISON:  null    FINDINGS:  CT abdomen and pelvis with contrast    Comparison: None    Findings:    There is mild bibasilar atelectasis.    There is mild hepatic steatosis. The gallbladder, pancreas, adrenal glands, and kidneys are unremarkable. There are calcified splenic granulomas.    The stomach is mildly distended. The small bowel is mildly distended. There is transition to normal diameter small bowel within the right lower quadrant. The appendix is normal. There is mild colonic diverticulosis. There is no acute diverticulitis.   There is no free fluid or free air.    The aorta is normal in diameter. IVC is widely patent. There are no enlarged lymph nodes. There is no  fracture or suspicious lytic or sclerotic lesion.      Impression:      IMPRESSION:    1. Mildly distended stomach and small bowel with transition to normal diameter small bowel in the right lower quadrant consistent with partial small bowel obstruction, ileus, or gastroenteritis.    2. Mild colonic diverticulosis.    3. Mild hepatic steatosis.    Authenticated and Electronically Signed by Johnnie Stevens MD on  01/30/2025 12:55:13 AM            Echo:      Condition on Discharge:      Stable.    Code status during the hospital stay:    Code Status and Medical Interventions: CPR (Attempt to Resuscitate); Full Support   Ordered at: 01/30/25 0129     Code Status (Patient has no pulse and is not breathing):    CPR (Attempt to Resuscitate)     Medical Interventions (Patient has pulse or is breathing):    Full Support     Discharge Disposition:    Home or Self Care    Discharge Medications:       Discharge Medications        Continue These Medications        Instructions Start Date   ondansetron ODT 4 MG disintegrating tablet  Commonly known as: ZOFRAN-ODT   4 mg, Translingual, 4 Times Daily PRN             Stop These Medications      amoxicillin-clavulanate 875-125 MG per tablet  Commonly known as: AUGMENTIN     losartan 50 MG tablet  Commonly known as: COZAAR     Lunesta 3 MG tablet  Generic drug: eszopiclone            Discharge Diet:     Diet Instructions       Diet: Liquid Diets; Full Liquid; Thin (IDDSI 0)      Discharge Diet: Liquid Diets    Liquid Diet: Full Liquid    Fluid Consistency: Thin (IDDSI 0)    Advance as tolerated.          Activity at Discharge:     Activity Instructions       Activity as Tolerated            Follow-up Appointments:     Follow-up Information       Taniya Moise, DO Follow up in 4 day(s).    Specialty: Family Medicine  Contact information:  [address] [facility] [address] [phone]                           No future appointments.  Test Results Pending at  Discharge:    Pending Results       Procedure [Order ID] Specimen - Date/Time    Blood Culture - Blood, Arm, Right [[id number]] Collected: 01/30/25 0048    Specimen: Blood from Arm, Right Updated: 01/30/25 0055    Blood Culture - Blood, Hand, Left [[id number]] Collected: 01/30/25 0046    Specimen: Blood from Hand, Left Updated: 01/30/25 0055                 Rosa Maria Diego, AGUSTIN  01/30/25  15:33 EST    Time: I spent >30 minutes on this discharge activity which included: face-to-face encounter with the patient, reviewing the data in the system, coordination of the care with the nursing staff as well as consultants, documentation, and entering orders.     Dictated utilizing Dragon dictation.

## 2025-01-30 NOTE — ED NOTES
Noted pt to desat to 66% on room air. Pt placed on 6LPM NC. Noted oxygen saturation to increase to 93%. Provider aware.

## 2025-01-31 ENCOUNTER — APPOINTMENT (OUTPATIENT)
Dept: GENERAL RADIOLOGY | Facility: HOSPITAL | Age: 55
DRG: 390 | End: 2025-01-31
Payer: COMMERCIAL

## 2025-01-31 ENCOUNTER — APPOINTMENT (OUTPATIENT)
Dept: CT IMAGING | Facility: HOSPITAL | Age: 55
DRG: 390 | End: 2025-01-31
Payer: COMMERCIAL

## 2025-01-31 PROBLEM — K56.609 SBO (SMALL BOWEL OBSTRUCTION): Status: ACTIVE | Noted: 2025-01-30

## 2025-01-31 PROBLEM — K56.609 BOWEL OBSTRUCTION: Status: ACTIVE | Noted: 2025-01-31

## 2025-01-31 LAB
ALBUMIN SERPL-MCNC: 3.9 G/DL (ref 3.5–5.2)
ALBUMIN/GLOB SERPL: 1.9 G/DL
ALP SERPL-CCNC: 61 U/L (ref 39–117)
ALT SERPL W P-5'-P-CCNC: 27 U/L (ref 1–41)
ANION GAP SERPL CALCULATED.3IONS-SCNC: 8 MMOL/L (ref 5–15)
AST SERPL-CCNC: 16 U/L (ref 1–40)
BILIRUB SERPL-MCNC: 0.5 MG/DL (ref 0–1.2)
BILIRUB UR QL STRIP: NEGATIVE
BUN SERPL-MCNC: 5 MG/DL (ref 6–20)
BUN/CREAT SERPL: 4.5 (ref 7–25)
CALCIUM SPEC-SCNC: 8.5 MG/DL (ref 8.6–10.5)
CHLORIDE SERPL-SCNC: 105 MMOL/L (ref 98–107)
CLARITY UR: CLEAR
CO2 SERPL-SCNC: 28 MMOL/L (ref 22–29)
COLOR UR: YELLOW
CREAT SERPL-MCNC: 1.12 MG/DL (ref 0.76–1.27)
DEPRECATED RDW RBC AUTO: 43 FL (ref 37–54)
EGFRCR SERPLBLD CKD-EPI 2021: 77.6 ML/MIN/1.73
ERYTHROCYTE [DISTWIDTH] IN BLOOD BY AUTOMATED COUNT: 12.5 % (ref 12.3–15.4)
GLOBULIN UR ELPH-MCNC: 2.1 GM/DL
GLUCOSE SERPL-MCNC: 137 MG/DL (ref 65–99)
GLUCOSE UR STRIP-MCNC: NEGATIVE MG/DL
HCT VFR BLD AUTO: 44.1 % (ref 37.5–51)
HGB BLD-MCNC: 14.7 G/DL (ref 13–17.7)
HGB UR QL STRIP.AUTO: NEGATIVE
KETONES UR QL STRIP: ABNORMAL
LEUKOCYTE ESTERASE UR QL STRIP.AUTO: NEGATIVE
MCH RBC QN AUTO: 30.9 PG (ref 26.6–33)
MCHC RBC AUTO-ENTMCNC: 33.3 G/DL (ref 31.5–35.7)
MCV RBC AUTO: 92.8 FL (ref 79–97)
NITRITE UR QL STRIP: NEGATIVE
PH UR STRIP.AUTO: 6 [PH] (ref 5–8)
PLATELET # BLD AUTO: 208 10*3/MM3 (ref 140–450)
PMV BLD AUTO: 10 FL (ref 6–12)
POTASSIUM SERPL-SCNC: 4.4 MMOL/L (ref 3.5–5.2)
PROT SERPL-MCNC: 6 G/DL (ref 6–8.5)
PROT UR QL STRIP: NEGATIVE
RBC # BLD AUTO: 4.75 10*6/MM3 (ref 4.14–5.8)
SODIUM SERPL-SCNC: 141 MMOL/L (ref 136–145)
SP GR UR STRIP: 1.03 (ref 1–1.03)
UROBILINOGEN UR QL STRIP: ABNORMAL
WBC NRBC COR # BLD AUTO: 11.23 10*3/MM3 (ref 3.4–10.8)

## 2025-01-31 PROCEDURE — 85027 COMPLETE CBC AUTOMATED: CPT | Performed by: INTERNAL MEDICINE

## 2025-01-31 PROCEDURE — 99232 SBSQ HOSP IP/OBS MODERATE 35: CPT | Performed by: INTERNAL MEDICINE

## 2025-01-31 PROCEDURE — 25510000001 IOPAMIDOL 61 % SOLUTION: Performed by: EMERGENCY MEDICINE

## 2025-01-31 PROCEDURE — 25010000002 MORPHINE PER 10 MG: Performed by: EMERGENCY MEDICINE

## 2025-01-31 PROCEDURE — 74018 RADEX ABDOMEN 1 VIEW: CPT

## 2025-01-31 PROCEDURE — 74176 CT ABD & PELVIS W/O CONTRAST: CPT

## 2025-01-31 PROCEDURE — 25010000002 MORPHINE PER 10 MG: Performed by: INTERNAL MEDICINE

## 2025-01-31 PROCEDURE — 80053 COMPREHEN METABOLIC PANEL: CPT | Performed by: INTERNAL MEDICINE

## 2025-01-31 PROCEDURE — 81003 URINALYSIS AUTO W/O SCOPE: CPT | Performed by: INTERNAL MEDICINE

## 2025-01-31 PROCEDURE — 25010000002 POTASSIUM CHLORIDE PER 2 MEQ OF POTASSIUM: Performed by: INTERNAL MEDICINE

## 2025-01-31 PROCEDURE — 99233 SBSQ HOSP IP/OBS HIGH 50: CPT | Performed by: SURGERY

## 2025-01-31 PROCEDURE — 25010000002 ENOXAPARIN PER 10 MG: Performed by: INTERNAL MEDICINE

## 2025-01-31 RX ORDER — AMOXICILLIN 250 MG
2 CAPSULE ORAL 2 TIMES DAILY PRN
Status: DISCONTINUED | OUTPATIENT
Start: 2025-01-31 | End: 2025-02-03 | Stop reason: HOSPADM

## 2025-01-31 RX ORDER — MORPHINE SULFATE 2 MG/ML
2 INJECTION, SOLUTION INTRAMUSCULAR; INTRAVENOUS
Status: DISCONTINUED | OUTPATIENT
Start: 2025-01-31 | End: 2025-02-03 | Stop reason: HOSPADM

## 2025-01-31 RX ORDER — ONDANSETRON 4 MG/1
4 TABLET, ORALLY DISINTEGRATING ORAL EVERY 6 HOURS PRN
Status: DISCONTINUED | OUTPATIENT
Start: 2025-01-31 | End: 2025-02-03 | Stop reason: HOSPADM

## 2025-01-31 RX ORDER — SODIUM CHLORIDE 0.9 % (FLUSH) 0.9 %
10 SYRINGE (ML) INJECTION EVERY 12 HOURS SCHEDULED
Status: DISCONTINUED | OUTPATIENT
Start: 2025-01-31 | End: 2025-02-03 | Stop reason: HOSPADM

## 2025-01-31 RX ORDER — CALCIUM CARBONATE 500 MG/1
2 TABLET, CHEWABLE ORAL 2 TIMES DAILY PRN
Status: DISCONTINUED | OUTPATIENT
Start: 2025-01-31 | End: 2025-02-03 | Stop reason: HOSPADM

## 2025-01-31 RX ORDER — SODIUM CHLORIDE 0.9 % (FLUSH) 0.9 %
10 SYRINGE (ML) INJECTION AS NEEDED
Status: DISCONTINUED | OUTPATIENT
Start: 2025-01-31 | End: 2025-02-03 | Stop reason: HOSPADM

## 2025-01-31 RX ORDER — ACETAMINOPHEN 325 MG/1
650 TABLET ORAL EVERY 4 HOURS PRN
Status: DISCONTINUED | OUTPATIENT
Start: 2025-01-31 | End: 2025-02-03 | Stop reason: HOSPADM

## 2025-01-31 RX ORDER — SODIUM CHLORIDE 9 MG/ML
40 INJECTION, SOLUTION INTRAVENOUS AS NEEDED
Status: DISCONTINUED | OUTPATIENT
Start: 2025-01-31 | End: 2025-02-03 | Stop reason: HOSPADM

## 2025-01-31 RX ORDER — ONDANSETRON 4 MG/1
4 TABLET, ORALLY DISINTEGRATING ORAL 4 TIMES DAILY PRN
Status: DISCONTINUED | OUTPATIENT
Start: 2025-01-31 | End: 2025-02-03 | Stop reason: HOSPADM

## 2025-01-31 RX ORDER — BISACODYL 5 MG/1
5 TABLET, DELAYED RELEASE ORAL DAILY PRN
Status: DISCONTINUED | OUTPATIENT
Start: 2025-01-31 | End: 2025-02-03 | Stop reason: HOSPADM

## 2025-01-31 RX ORDER — ONDANSETRON 2 MG/ML
4 INJECTION INTRAMUSCULAR; INTRAVENOUS EVERY 6 HOURS PRN
Status: DISCONTINUED | OUTPATIENT
Start: 2025-01-31 | End: 2025-02-03 | Stop reason: HOSPADM

## 2025-01-31 RX ORDER — BISACODYL 10 MG
10 SUPPOSITORY, RECTAL RECTAL DAILY PRN
Status: DISCONTINUED | OUTPATIENT
Start: 2025-01-31 | End: 2025-02-03 | Stop reason: HOSPADM

## 2025-01-31 RX ORDER — DEXTROSE MONOHYDRATE, SODIUM CHLORIDE, AND POTASSIUM CHLORIDE 50; 1.49; 4.5 G/1000ML; G/1000ML; G/1000ML
100 INJECTION, SOLUTION INTRAVENOUS CONTINUOUS
Status: DISCONTINUED | OUTPATIENT
Start: 2025-01-31 | End: 2025-01-31

## 2025-01-31 RX ORDER — ENOXAPARIN SODIUM 100 MG/ML
40 INJECTION SUBCUTANEOUS EVERY 24 HOURS
Status: DISCONTINUED | OUTPATIENT
Start: 2025-01-31 | End: 2025-02-03 | Stop reason: HOSPADM

## 2025-01-31 RX ORDER — IOPAMIDOL 612 MG/ML
100 INJECTION, SOLUTION INTRAVASCULAR
Status: COMPLETED | OUTPATIENT
Start: 2025-01-31 | End: 2025-01-31

## 2025-01-31 RX ORDER — POLYETHYLENE GLYCOL 3350 17 G/17G
17 POWDER, FOR SOLUTION ORAL DAILY PRN
Status: DISCONTINUED | OUTPATIENT
Start: 2025-01-31 | End: 2025-02-03 | Stop reason: HOSPADM

## 2025-01-31 RX ADMIN — Medication 10 ML: at 20:43

## 2025-01-31 RX ADMIN — MORPHINE SULFATE 2 MG: 2 INJECTION, SOLUTION INTRAMUSCULAR; INTRAVENOUS at 05:57

## 2025-01-31 RX ADMIN — ENOXAPARIN SODIUM 40 MG: 100 INJECTION SUBCUTANEOUS at 05:58

## 2025-01-31 RX ADMIN — MORPHINE SULFATE 4 MG: 4 INJECTION, SOLUTION INTRAMUSCULAR; INTRAVENOUS at 01:41

## 2025-01-31 RX ADMIN — POTASSIUM CHLORIDE: 149 INJECTION, SOLUTION, CONCENTRATE INTRAVENOUS at 18:31

## 2025-01-31 RX ADMIN — Medication 10 ML: at 22:43

## 2025-01-31 RX ADMIN — MORPHINE SULFATE 2 MG: 2 INJECTION, SOLUTION INTRAMUSCULAR; INTRAVENOUS at 22:43

## 2025-01-31 RX ADMIN — IOPAMIDOL 100 ML: 612 INJECTION, SOLUTION INTRAVENOUS at 00:48

## 2025-01-31 RX ADMIN — Medication 10 ML: at 09:17

## 2025-01-31 RX ADMIN — POTASSIUM CHLORIDE: 149 INJECTION, SOLUTION, CONCENTRATE INTRAVENOUS at 05:57

## 2025-01-31 NOTE — ED PROVIDER NOTES
EMERGENCY DEPARTMENT ENCOUNTER    Pt Name: Dwayne Denver Short  MRN: [id number]  Pt :   1970  Room Number:    Date of encounter:  2025  PCP: Taniya Moise DO  ED Provider: Edmundo Tinajero MD    Historian: Patient      HPI:  Chief Complaint: Abdominal pain, nausea        Context: Dwayne Denver Short is a 54 y.o. male who presents to the ED c/o abdominal pain and nausea.  Patient with past history significant for diabetes, heart disease and diverticulitis.  Patient hospitalized this morning for gastroenteritis versus ileus versus small bowel obstruction.  Patient had NG tube placed.  Patient symptoms thought to be secondary to Mounjaro side effects.  NG tube discontinued and patient discharged with instruction to discontinue Mounjaro as well.  Instructed to follow-up with PCP within 1 week.    Patient returns to the emerged part this evening stating that he is having worsening midepigastric abdominal pain that radiates to the right lower quadrant.  He says he is feeling nauseous and has been belching.  He denies vomiting.  He says his last bowel movement was this evening but it was very small and has been passing flatus.    PAST MEDICAL HISTORY  Past Medical History:   Diagnosis Date    Diverticulitis     Myocardial infarction          PAST SURGICAL HISTORY  No past surgical history on file.      FAMILY HISTORY  No family history on file.      SOCIAL HISTORY  Social History     Socioeconomic History    Marital status: Single   Tobacco Use    Smoking status: Never    Smokeless tobacco: Never   Vaping Use    Vaping status: Never Used   Substance and Sexual Activity    Alcohol use: Yes     Comment: occasionally    Drug use: No    Sexual activity: Defer         ALLERGIES  Codeine        REVIEW OF SYSTEMS    All systems reviewed and negative except for those discussed in HPI.       PHYSICAL EXAM    I have reviewed the triage vital signs and nursing notes.    ED Triage Vitals [25 2207]   Temp  Heart Rate Resp BP SpO2   98.2 °F (36.8 °C) 83 20 137/81 97 %      Temp src Heart Rate Source Patient Position BP Location FiO2 (%)   Oral Monitor Sitting Left arm --         General: Moderate acute distress  Skin: normal color, warm and dry  Head: normocephalic, atraumatic  Eyes: Pupils equally round and reactive to light.  Nose: normal nasal mucosa, no visible deformity.  Mouth: dry mucous membranes.  Neck: supple.  Chest: no retractions, no visible deformity  Cardiovascular: Regular rate and rhythm.  Lungs: clear to auscultation bilaterally.  Abdomen: soft, tender to palpation in the midepigastrium and right lower quadrant, moderately-distended. No rebound tenderness, no guarding.  No peritonitis.  Reducible umbilical hernia.  Neuro:  alert and oriented x3, no focal neurological deficits.  Psych:  appropriate mood and behavior.        LAB RESULTS  Recent Results (from the past 24 hours)   POC Glucose Once    Collection Time: 01/30/25  6:54 AM    Specimen: Blood   Result Value Ref Range    Glucose 142 (H) 70 - 130 mg/dL   CBC (No Diff)    Collection Time: 01/30/25  9:05 AM    Specimen: Blood   Result Value Ref Range    WBC 12.51 (H) 3.40 - 10.80 10*3/mm3    RBC 4.82 4.14 - 5.80 10*6/mm3    Hemoglobin 15.2 13.0 - 17.7 g/dL    Hematocrit 43.9 37.5 - 51.0 %    MCV 91.1 79.0 - 97.0 fL    MCH 31.5 26.6 - 33.0 pg    MCHC 34.6 31.5 - 35.7 g/dL    RDW 12.2 (L) 12.3 - 15.4 %    RDW-SD 40.9 37.0 - 54.0 fl    MPV 10.2 6.0 - 12.0 fL    Platelets 216 140 - 450 10*3/mm3   Comprehensive Metabolic Panel    Collection Time: 01/30/25  9:05 AM    Specimen: Blood   Result Value Ref Range    Glucose 151 (H) 65 - 99 mg/dL    BUN 8 6 - 20 mg/dL    Creatinine 0.97 0.76 - 1.27 mg/dL    Sodium 138 136 - 145 mmol/L    Potassium 3.9 3.5 - 5.2 mmol/L    Chloride 104 98 - 107 mmol/L    CO2 24.8 22.0 - 29.0 mmol/L    Calcium 8.4 (L) 8.6 - 10.5 mg/dL    Total Protein 6.2 6.0 - 8.5 g/dL    Albumin 4.0 3.5 - 5.2 g/dL    ALT (SGPT) 37 1 - 41 U/L     AST (SGOT) 22 1 - 40 U/L    Alkaline Phosphatase 70 39 - 117 U/L    Total Bilirubin 0.5 0.0 - 1.2 mg/dL    Globulin 2.2 gm/dL    A/G Ratio 1.8 g/dL    BUN/Creatinine Ratio 8.2 7.0 - 25.0    Anion Gap 9.2 5.0 - 15.0 mmol/L    eGFR 92.2 >60.0 mL/min/1.73   Green Top (Gel)    Collection Time: 01/30/25 10:23 PM   Result Value Ref Range    Extra Tube Hold for add-ons.    Lavender Top    Collection Time: 01/30/25 10:23 PM   Result Value Ref Range    Extra Tube hold for add-on    Gold Top - SST    Collection Time: 01/30/25 10:23 PM   Result Value Ref Range    Extra Tube Hold for add-ons.    Light Blue Top    Collection Time: 01/30/25 10:23 PM   Result Value Ref Range    Extra Tube Hold for add-ons.    Comprehensive Metabolic Panel    Collection Time: 01/30/25 10:23 PM    Specimen: Blood   Result Value Ref Range    Glucose 150 (H) 65 - 99 mg/dL    BUN 6 6 - 20 mg/dL    Creatinine 1.25 0.76 - 1.27 mg/dL    Sodium 142 136 - 145 mmol/L    Potassium 3.9 3.5 - 5.2 mmol/L    Chloride 102 98 - 107 mmol/L    CO2 26.0 22.0 - 29.0 mmol/L    Calcium 9.6 8.6 - 10.5 mg/dL    Total Protein 7.2 6.0 - 8.5 g/dL    Albumin 4.5 3.5 - 5.2 g/dL    ALT (SGPT) 36 1 - 41 U/L    AST (SGOT) 22 1 - 40 U/L    Alkaline Phosphatase 76 39 - 117 U/L    Total Bilirubin 0.7 0.0 - 1.2 mg/dL    Globulin 2.7 gm/dL    A/G Ratio 1.7 g/dL    BUN/Creatinine Ratio 4.8 (L) 7.0 - 25.0    Anion Gap 14.0 5.0 - 15.0 mmol/L    eGFR 68.0 >60.0 mL/min/1.73   Lipase    Collection Time: 01/30/25 10:23 PM    Specimen: Blood   Result Value Ref Range    Lipase 17 13 - 60 U/L   CBC Auto Differential    Collection Time: 01/30/25 10:23 PM    Specimen: Blood   Result Value Ref Range    WBC 14.53 (H) 3.40 - 10.80 10*3/mm3    RBC 5.41 4.14 - 5.80 10*6/mm3    Hemoglobin 17.0 13.0 - 17.7 g/dL    Hematocrit 49.7 37.5 - 51.0 %    MCV 91.9 79.0 - 97.0 fL    MCH 31.4 26.6 - 33.0 pg    MCHC 34.2 31.5 - 35.7 g/dL    RDW 12.6 12.3 - 15.4 %    RDW-SD 42.5 37.0 - 54.0 fl    MPV 10.6 6.0 - 12.0  fL    Platelets 259 140 - 450 10*3/mm3    Neutrophil % 83.1 (H) 42.7 - 76.0 %    Lymphocyte % 10.0 (L) 19.6 - 45.3 %    Monocyte % 6.1 5.0 - 12.0 %    Eosinophil % 0.2 (L) 0.3 - 6.2 %    Basophil % 0.3 0.0 - 1.5 %    Immature Grans % 0.3 0.0 - 0.5 %    Neutrophils, Absolute 12.06 (H) 1.70 - 7.00 10*3/mm3    Lymphocytes, Absolute 1.46 0.70 - 3.10 10*3/mm3    Monocytes, Absolute 0.89 0.10 - 0.90 10*3/mm3    Eosinophils, Absolute 0.03 0.00 - 0.40 10*3/mm3    Basophils, Absolute 0.04 0.00 - 0.20 10*3/mm3    Immature Grans, Absolute 0.05 0.00 - 0.05 10*3/mm3    nRBC 0.0 0.0 - 0.2 /100 WBC   Lactic Acid, Plasma    Collection Time: 01/30/25 10:32 PM    Specimen: Blood   Result Value Ref Range    Lactate 1.4 0.5 - 2.0 mmol/L       If labs were ordered, I independently reviewed the results and considered them in treating the patient.  See medical decision making discussion section for my interpretation of lab results.        RADIOLOGY  XR Abdomen 1 View    Result Date: 1/31/2025  FINAL REPORT TECHNIQUE: null CLINICAL HISTORY: ng placement COMPARISON: null FINDINGS: Abdomen 1 view Comparison: 01/30/2025. Findings: Supine view of portion of abdomen submitted. Pelvis not included. Nasogastric tube tip projects over left upper quadrant of abdomen in the region of the stomach. Distended small bowel increased.     Impression: 1. NG tube tip projects over stomach. 2. Increased small bowel distention. Authenticated and Electronically Signed by Emerson Escobedo MD on 01/31/2025 05:28:10 AM    CT Abdomen Pelvis With Contrast    Addendum Date: 1/31/2025    ADDENDUM REPORT ADDENDUM: This report was discussed with Edmundo Tinajero MD on Jan 31, 2025 01:15:00 EST. Authenticated and Electronically Signed by Johnnie Stevens MD on 01/31/2025 01:16:37 AM    Result Date: 1/31/2025  FINAL REPORT TECHNIQUE: null CLINICAL HISTORY: Mid-epigastric abd pain, abd distention, possible SBO on CT from last night COMPARISON: null FINDINGS: CT abdomen and  pelvis with contrast Comparison: CR - XR ABDOMEN 1 VW - 1/30/25 01:34 EST CT/SR - CT ABDOMEN PELVIS W CONTRAST - 1/30/25 00:07 EST Findings: There is increased bibasilar atelectasis. There is mild hepatic steatosis. New faint hyperdensity layering within the gallbladder is consistent with vicarious excretion of contrast. Gallbladder is otherwise unremarkable. The pancreas, adrenal glands, and kidneys are unremarkable. There are calcified splenic granulomas. NG tube is been removed. There is slightly increased distention of small bowel in the mid and lower abdomen. There is no completely decompressed small bowel in the right lower quadrant with more abrupt transition consistent with small-bowel obstruction. There is mild mesenteric edema adjacent to dilated small bowel loops. There is mild colonic diverticulosis. The aorta is normal in diameter. IVC is widely patent. There are no enlarged lymph nodes. There is a small fat containing periumbilical hernia. There is no fracture or suspicious lytic or sclerotic lesion.     IMPRESSION: 1. Findings consistent with small-bowel obstruction with transition point in the right lower quadrant and slightly increased distention of small bowel. 2. Chronic findings as above. Authenticated and Electronically Signed by Johnnie Stevens MD on 01/31/2025 01:13:37 AM     I ordered and independently reviewed the above noted radiographic studies.  See radiologist's dictation for official interpretation.            PROCEDURES    Procedures    No orders to display       MEDICATIONS GIVEN IN ER    Medications   sodium chloride 0.9 % flush 10 mL (has no administration in time range)   sodium chloride 0.9 % flush 10 mL (has no administration in time range)   ondansetron ODT (ZOFRAN-ODT) disintegrating tablet 4 mg (has no administration in time range)   sodium chloride 0.9 % flush 10 mL (has no administration in time range)   sodium chloride 0.9 % flush 10 mL (has no administration in time range)    sodium chloride 0.9 % infusion 40 mL (has no administration in time range)   acetaminophen (TYLENOL) tablet 650 mg (has no administration in time range)   calcium carbonate (TUMS) chewable tablet 500 mg (200 mg elemental) (has no administration in time range)   sennosides-docusate (PERICOLACE) 8.6-50 MG per tablet 2 tablet (has no administration in time range)     And   polyethylene glycol (MIRALAX) packet 17 g (has no administration in time range)     And   bisacodyl (DULCOLAX) EC tablet 5 mg (has no administration in time range)     And   bisacodyl (DULCOLAX) suppository 10 mg (has no administration in time range)   ondansetron ODT (ZOFRAN-ODT) disintegrating tablet 4 mg (has no administration in time range)     Or   ondansetron (ZOFRAN) injection 4 mg (has no administration in time range)   melatonin tablet 5 mg (5 mg Oral Not Given 1/31/25 0549)   Pharmacy to Dose enoxaparin (LOVENOX) (has no administration in time range)   Potassium Replacement - Follow Nurse / BPA Driven Protocol (has no administration in time range)   Magnesium Standard Dose Replacement - Follow Nurse / BPA Driven Protocol (has no administration in time range)   Phosphorus Replacement - Follow Nurse / BPA Driven Protocol (has no administration in time range)   Calcium Replacement - Follow Nurse / BPA Driven Protocol (has no administration in time range)   Enoxaparin Sodium (LOVENOX) syringe 40 mg (40 mg Subcutaneous Given 1/31/25 0558)   Morphine sulfate (PF) injection 2 mg (2 mg Intravenous Given 1/31/25 0557)   dextrose 5 % and sodium chloride 0.45 % 990 mL with potassium chloride 20 mEq infusion ( Intravenous New Bag 1/31/25 0557)   sodium chloride 0.9 % bolus 1,000 mL (0 mL Intravenous Stopped 1/31/25 0117)   ondansetron (ZOFRAN) injection 8 mg (8 mg Intravenous Given 1/30/25 2308)   morphine injection 4 mg (4 mg Intravenous Given 1/30/25 2308)   iopamidol (ISOVUE-300) 61 % injection 100 mL (100 mL Intravenous Given 1/31/25 0048)    morphine injection 4 mg (4 mg Intravenous Given 1/31/25 0141)         MEDICAL DECISION MAKING, PROGRESS, and CONSULTS    All labs, if obtained, have been independently reviewed by me.  All radiology studies, if obtained, have been reviewed by me and the radiologist dictating the report.  All EKG's, if obtained, have been independently viewed and interpreted by me/my attending physician.      Discussion below represents my analysis of pertinent findings related to patient's condition, differential diagnosis, treatment plan and final disposition.                         Differential diagnosis:    Differential diagnosis for this patient includes hepatitis, cholangitis, cholecystitis, pancreatitis, gastritis, enteritis, colitis, gastroenteritis, appendicitis, volvulus, obstruction, ischemia, torsion, cystitis, pyelonephritis, nephrolithiasis, uretolithiasis, other acute emergency.    Medical Decision Making Discussion:    Vitals reviewed and are normal.    Labs reviewed which show leukocytosis.  Labs otherwise unremarkable.    CT abdomen pelvis with IV and p.o. contrast performed and per radiology is consistent with small bowel obstruction.    NG placed.    Case discussed with Dr. Tomas who accepted the patient for hospitalization.    Additional sources:    - External (non-ED) record review: History and physical from today at 0226 documenting significant past medical history of diverticulitis, heart disease and diabetes.  Presented with abdominal pain and vomiting.  Hospitalized with CT imaging concerning for gastroenteritis versus ileus versus partial small bowel obstruction.    Reviewed discharge summary from today and patient diagnosed with intractable nausea and vomiting with partial small bowel obstruction, acute kidney injury.  NG tube had been placed.  Patient given IV fluids.  General surgery consulted.  Symptoms thought to be secondary to side effect from GLP-1 inhibitor.  Did not think patient had a bowel  obstruction and NG tube discontinued.  Instructed to discontinue Mounjaro.  Patient tolerated p.o.  Discharged with instruction to follow-up outpatient with PCP within 1 week    Shared Decision Making:  After my consideration of clinical presentation and any laboratory/radiology studies obtained, I discussed the findings with the patient/patient representative who is in agreement with the treatment plan and the final disposition.   Risks and benefits of discharge and/or observation/admission were discussed.    Orders placed during this visit:  Orders Placed This Encounter   Procedures    CT Abdomen Pelvis With Contrast    XR Abdomen 1 View    [place] Draw    Comprehensive Metabolic Panel    Lipase    Urinalysis With Microscopic If Indicated (No Culture) - Urine, Clean Catch    Lactic Acid, Plasma    CBC Auto Differential    CBC (No Diff)    Comprehensive Metabolic Panel    NPO Diet NPO Type: Strict NPO, Sips with Meds, Ice Chips    Nasogastric tube insertion    Vital Signs    Intake & Output    Weigh Patient    Oral Care    Saline Lock & Maintain IV Access    Code Status and Medical Interventions: CPR (Attempt to Resuscitate); Full Support    Inpatient General Surgery Consult    Insert peripheral IV    Insert Peripheral IV    Insert Peripheral IV    Initiate Observation Status    ED Bed Request    Green Top (Gel)    Lavender Top    Gold Top - SST    Light Blue Top    CBC & Differential     AS OF 06:16 EST VITALS:    BP - 117/71  HR - 83  TEMP - 98.2 °F (36.8 °C) (Oral)  O2 SATS - 97%                  DIAGNOSIS  Final diagnoses:   Small bowel obstruction         DISPOSITION  Admit      Please note that portions of this document were completed with voice recognition software.        Edmundo Tinajero MD  01/31/25 0651

## 2025-01-31 NOTE — PAYOR COMM NOTE
"  D/C SUMMARY BUT THEN READMITTED ON SAME DAY OF 1/30/25  UR MANAGER; WOLF JIM -[phone] AND [phone]     NPI:  [id number]  TAX ID:  [id number]          Short, Dwayne Denver (54 y.o. Male)       Date of Birth   1970    Social Security Number       Address   [address] [place] [address]    Home Phone   [phone]    MRN   [id number]       Hindu   Zoroastrianism    Marital Status   Single                            Admission Date   1/29/25    Admission Type   Emergency    Admitting Provider   Abelardo Tomas DO    Attending Provider       Department, Room/Bed   [facility] [place]       Discharge Date   1/30/2025    Discharge Disposition   Home or Self Care    Discharge Destination   Home                              Attending Provider: (none)   Allergies: Codeine    Isolation: None   Infection: None   Code Status: CPR    Ht: 185.4 cm (73\")   Wt: 122 kg (270 lb)    Admission Cmt: None   Principal Problem: SBO (small bowel obstruction) [K56.609]                   Active Insurance as of 1/29/2025       Primary Coverage       Payor Plan Insurance Group Employer/Plan Group    [employer]HE[employer] BLUE CROSS ANTHEM BLUE CROSS BLUE SHIELD PPO [id number]       Payor Plan Address Payor Plan Phone Number Payor Plan Fax Number Effective Dates    [address] [phone]  1/1/2025 - None Entered    [place] [address]         Subscriber Name Subscriber Birth Date Member ID       SHORT,DWAYNE DENVER 1970 [id number]                     Emergency Contacts        (Rel.) Home Phone Work Phone Mobile Phone    Petrona Neal (Mother) [phone] -- --              Physician Progress Notes (last 24 hours)  Notes from 01/30/25 0922 through 01/31/25 0922   No notes of this type exist for this encounter.          Discharge Summary        Rosa Maria Diego APRN at 01/30/25 1331       Attestation signed by Rizwana Francois DO at 01/31/25 0848    I " have reviewed this documentation and agree.                      [facility]   DISCHARGE SUMMARY      Name:  Dwayne Denver Short   Age:  54 y.o.  Sex:  male  :  1970  MRN:  [id number]   Visit Number:  [id number]    Admission Date:  2025  Date of Discharge:  2025  Primary Care Physician:  Taniya Moise, DO    Discharge Diagnoses:     Intractable nausea vomiting with partial small bowel obstruction, POA  Acute kidney injury, POA  Prediabetes  History of diverticulitis    Problem List:     Active Hospital Problems    Diagnosis  POA    **Partial small bowel obstruction [K56.600]  Yes    Prerenal azotemia [R79.89]  Yes      Resolved Hospital Problems   No resolved problems to display.     Presenting Problem:    Chief Complaint   Patient presents with    Abdominal Pain      Consults:     Consulting Physician(s)                     None              Procedures Performed:        History of presenting illness/Hospital Course:    Mr. Solitario is a pleasant 54-year-old male with pertinent past medical history of diverticulitis, MI, prediabetes who presented with a 2-hour history of abdominal tenderness with multiple episodes of vomiting.  Patient recently initiated on Mounjaro.  No prior abdominal surgeries.  Denied fever or chills.    Patient presented to emergency department, upon arrival slightly tachycardic shortly thereafter required oxygen due to pain medication administration.  Pertinent labs and imaging noted initial lactate 2.6 which resolved with repeat, creatinine 1.4, WBC 13, blood cultures obtained x 2, CT abdomen pelvis noting mildly distended stomach,consistent with partial small bowel obstruction ileus or gastroenteritis.  NG tube placed.  IV fluids initiated.  General surgeon Dr. Vega consulted.  Labs normalized.  Per Dr. Vega overall improvement noted after NG tube placed, likely side effect from recent GLP 1 inhibitor for weight loss, do not feel as if  this is bowel obstruction, discontinue NG tube, ambulate, advance diet as tolerated, discontinue Mounjaro at this time.  No surgical intervention necessary.  NG tube discontinued and diet advanced.  Patient tolerated well.  Advised to follow-up with PCP in 1 week.  Strict return precautions given.    Vital Signs:    Temp:  [98.9 °F (37.2 °C)] 98.9 °F (37.2 °C)  Heart Rate:  [] 66  Resp:  [13-22] 13  BP: (114-139)/(65-81) 130/73    Physical Exam:    General Appearance:  Alert and cooperative.  Well-appearing middle-age male.   Head:  Atraumatic and normocephalic.   Eyes: Conjunctivae and sclerae normal, no icterus. No pallor.   Ears:  Ears with no abnormalities noted.   Throat: No oral lesions, no thrush, oral mucosa moist.   Neck: Supple, trachea midline, no thyromegaly.   Back:   No kyphoscoliosis present. No tenderness to palpation.   Lungs:   Breath sounds heard bilaterally equally.  No crackles or wheezing. No Pleural rub or bronchial breathing.   Heart:  Normal S1 and S2, no murmur, no gallop, no rub. No JVD.   Abdomen:   Normal bowel sounds, no masses, no organomegaly. Soft, nontender, nondistended, no rebound tenderness.   Extremities: Supple, no edema, no cyanosis, no clubbing.   Pulses: Pulses palpable bilaterally.   Skin: No bleeding or rash.   Neurologic: Alert and oriented x 3. No facial asymmetry. Moves all four limbs. No tremors.     Pertinent Lab Results:     Results from last 7 days   Lab Units 01/30/25  0905 01/29/25  2348   SODIUM mmol/L 138 133*   POTASSIUM mmol/L 3.9 3.4*   CHLORIDE mmol/L 104 94*   CO2 mmol/L 24.8 25.1   BUN mg/dL 8 9   CREATININE mg/dL 0.97 1.40*   CALCIUM mg/dL 8.4* 9.8   BILIRUBIN mg/dL 0.5 0.9   ALK PHOS U/L 70 78   ALT (SGPT) U/L 37 47*   AST (SGOT) U/L 22 32   GLUCOSE mg/dL 151* 226*     Results from last 7 days   Lab Units 01/30/25  0905 01/29/25  2348   WBC 10*3/mm3 12.51* 13.13*   HEMOGLOBIN g/dL 15.2 16.5   HEMATOCRIT % 43.9 48.4   PLATELETS 10*3/mm3 216 252          Results from last 7 days   Lab Units 01/30/25  0046 01/29/25  2348   HSTROP T ng/L 7 7             Results from last 7 days   Lab Units 01/29/25  2348   LIPASE U/L 14               Pertinent Radiology Results:    Imaging Results (All)       Procedure Component Value Units Date/Time    XR Abdomen 1 View [[id number]] Collected: 01/30/25 0234     Updated: 01/30/25 0236    Narrative:      FINAL REPORT    TECHNIQUE:  null    CLINICAL HISTORY:  NG tube inserted    COMPARISON:  null    FINDINGS:  1 view abdomen    Comparison: CT/SR - CT ABDOMEN PELVIS W CONTRAST - 1/30/25 00:07 EST    Findings:    NG tube is in the stomach.      Impression:      Impression:    NG tube is in the stomach.    Authenticated and Electronically Signed by Johnnie Stevens MD on  01/30/2025 02:34:52 AM    CT Abdomen Pelvis With Contrast [[id number]] Collected: 01/30/25 0055     Updated: 01/30/25 0101    Addenda:        ADDENDUM REPORT    ADDENDUM:  This report was discussed with Dr. Eduin Faria MD on Jan 30, 2025   00:58:00 EST.    Authenticated and Electronically Signed by Johnnie Stevens MD on  01/30/2025 12:59:22 AM  Signed: 01/30/25 0059 by Johnnie Stevens MD    Narrative:      FINAL REPORT    TECHNIQUE:  null    CLINICAL HISTORY:  Epigastric and generalized abdominal pain, history of  diverticulitis,, eval fo    COMPARISON:  null    FINDINGS:  CT abdomen and pelvis with contrast    Comparison: None    Findings:    There is mild bibasilar atelectasis.    There is mild hepatic steatosis. The gallbladder, pancreas, adrenal glands, and kidneys are unremarkable. There are calcified splenic granulomas.    The stomach is mildly distended. The small bowel is mildly distended. There is transition to normal diameter small bowel within the right lower quadrant. The appendix is normal. There is mild colonic diverticulosis. There is no acute diverticulitis.   There is no free fluid or free air.    The aorta is normal in diameter. IVC is widely  patent. There are no enlarged lymph nodes. There is no fracture or suspicious lytic or sclerotic lesion.      Impression:      IMPRESSION:    1. Mildly distended stomach and small bowel with transition to normal diameter small bowel in the right lower quadrant consistent with partial small bowel obstruction, ileus, or gastroenteritis.    2. Mild colonic diverticulosis.    3. Mild hepatic steatosis.    Authenticated and Electronically Signed by Johnnie Stevens MD on  01/30/2025 12:55:13 AM            Echo:      Condition on Discharge:      Stable.    Code status during the hospital stay:    Code Status and Medical Interventions: CPR (Attempt to Resuscitate); Full Support   Ordered at: 01/30/25 0129     Code Status (Patient has no pulse and is not breathing):    CPR (Attempt to Resuscitate)     Medical Interventions (Patient has pulse or is breathing):    Full Support     Discharge Disposition:    Home or Self Care    Discharge Medications:       Discharge Medications        Continue These Medications        Instructions Start Date   ondansetron ODT 4 MG disintegrating tablet  Commonly known as: ZOFRAN-ODT   4 mg, Translingual, 4 Times Daily PRN             Stop These Medications      amoxicillin-clavulanate 875-125 MG per tablet  Commonly known as: AUGMENTIN     losartan 50 MG tablet  Commonly known as: COZAAR     Lunesta 3 MG tablet  Generic drug: eszopiclone            Discharge Diet:     Diet Instructions       Diet: Liquid Diets; Full Liquid; Thin (IDDSI 0)      Discharge Diet: Liquid Diets    Liquid Diet: Full Liquid    Fluid Consistency: Thin (IDDSI 0)    Advance as tolerated.          Activity at Discharge:     Activity Instructions       Activity as Tolerated            Follow-up Appointments:     Follow-up Information       Taniya Moise, DO Follow up in 4 day(s).    Specialty: Family Medicine  Contact information:  [address] [facility] [address] [phone]                           No  future appointments.  Test Results Pending at Discharge:    Pending Results       Procedure [Order ID] Specimen - Date/Time    Blood Culture - Blood, Arm, Right [[id number]] Collected: 01/30/25 0048    Specimen: Blood from Arm, Right Updated: 01/30/25 0055    Blood Culture - Blood, Hand, Left [[id number]] Collected: 01/30/25 0046    Specimen: Blood from Hand, Left Updated: 01/30/25 0055                 AGUSTIN Mohamud  01/30/25  15:33 EST    Time: I spent >30 minutes on this discharge activity which included: face-to-face encounter with the patient, reviewing the data in the system, coordination of the care with the nursing staff as well as consultants, documentation, and entering orders.     Dictated utilizing Dragon dictation.      Electronically signed by Rizwana Francois DO at 01/31/25 0808

## 2025-01-31 NOTE — CASE MANAGEMENT/SOCIAL WORK
Discharge Planning Assessment   Chad     Patient Name: Dwayne Denver Short  MRN: [id number]  Today's Date: 1/31/2025    Admit Date: 1/30/2025    Plan: Patient plans to return home where he lives alone; no new needs at this time   Discharge Needs Assessment       Row Name 01/31/25 0954       Living Environment    People in Home alone    Current Living Arrangements apartment    Duration at Residence 4 years    Potentially Unsafe Housing Conditions none    In the past 12 months has the electric, gas, oil, or water company threatened to shut off services in your home? No    Primary Care Provided by self    Provides Primary Care For no one    Family Caregiver if Needed none    Quality of Family Relationships helpful;involved;supportive    Able to Return to Prior Arrangements yes       Resource/Environmental Concerns    Resource/Environmental Concerns none    Transportation Concerns none       Transportation Needs    In the past 12 months, has lack of transportation kept you from medical appointments or from getting medications? no    In the past 12 months, has lack of transportation kept you from meetings, work, or from getting things needed for daily living? No       Food Insecurity    Within the past 12 months, you worried that your food would run out before you got the money to buy more. Never true    Within the past 12 months, the food you bought just didn't last and you didn't have money to get more. Never true       Transition Planning    Patient/Family Anticipates Transition to home    Patient/Family Anticipated Services at Transition none    Transportation Anticipated family or friend will provide       Discharge Needs Assessment    Readmission Within the Last 30 Days previous discharge plan unsuccessful    Equipment Currently Used at Home cpap    Concerns to be Addressed denies needs/concerns at this time    Do you want help finding or keeping work or a job? I do not need or want help    Do you want help  with school or training? For example, starting or completing job training or getting a high school diploma, GED or equivalent No    Anticipated Changes Related to Illness none    Equipment Needed After Discharge none    Provided Post Acute Provider List? N/A    N/A Provider List Comment Patient to return home; no new needs    Provided Post Acute Provider Quality & Resource List? N/A    N/A Quality & Resource List Comment Patient to return home; no new needs    Offered/Gave Vendor List no                   Discharge Plan       Row Name 01/31/25 0956       Plan    Plan Patient plans to return home where he lives alone; no new needs at this time    Patient/Family in Agreement with Plan yes    Provided Post Acute Provider List? N/A    N/A Provider List Comment Patient to return home; no new needs    Provided Post Acute Provider Quality & Resource List? N/A    N/A Quality & Resource List Comment Patient to return home; no new needs    Plan Comments Patient is awake and able to answer questions.  He is a current patient of Dr. Don and gets his medications from [facility].  He elects to enroll in [facility].  He uses a c-pap from [employer].  He denies the need for additional DME or services at [place].  At the time of DC the patient plans to return home where he lives alone.  Questions and concerns were addressed, 30 day readmission assessment completed at the time of this conversation.  Will provide additional resources and information upon request.    Final Note Patient to return home; no new needs                  Continued Care and Services - Admitted Since 1/30/2025    No active coordination exists for this encounter.          Demographic Summary       Row Name 01/31/25 0953       General Information    Admission Type inpatient    Arrived From emergency department    Referral Source admission list    Reason for Consult discharge planning    Preferred Language English       Contact Information    Permission Granted to  Share Info With ;family/designee                   Functional Status       Row Name 01/31/25 0953       Functional Status    Usual Activity Tolerance excellent    Current Activity Tolerance moderate       Physical Activity    On average, how many days per week do you engage in moderate to strenuous exercise (like a brisk walk)? 0 days    On average, how many minutes do you engage in exercise at this level? 0 min    Number of minutes of exercise per week 0       Assessment of Health Literacy    How often do you have someone help you read hospital materials? Never    How often do you have problems learning about your medical condition because of difficulty understanding written information? Never    How often do you have a problem understanding what is told to you about your medical condition? Never    How confident are you filling out medical forms by yourself? Extremely    Health Literacy Excellent       Functional Status, IADL    Medications independent    Meal Preparation independent    Housekeeping independent    Laundry independent    Shopping independent    If for any reason you need help with day-to-day activities such as bathing, preparing meals, shopping, managing finances, etc., do you get the help you need? I don't need any help       Mental Status    General Appearance WDL WDL       Mental Status Summary    Recent Changes in Mental Status/Cognitive Functioning no changes       Employment/    Employment Status employed full-time    Shift Worked first shift    Current or Previous Occupation professional                   Psychosocial       Row Name 01/31/25 0953       Values/Beliefs    Spiritual, Cultural Beliefs, Mormon Practices, Values that Affect Care no       Behavior WDL    Behavior WDL WDL       Emotion Mood WDL    Emotion/Mood/Affect WDL WDL       Speech WDL    Speech WDL WDL       Perceptual State WDL    Perceptual State WDL WDL       Thought Process WDL    Thought Process  WDL WDL       Intellectual Performance WDL    Intellectual Performance WDL WDL                   Abuse/Neglect       Row Name 01/31/25 0953       Personal Safety    Feels Unsafe at Home or Work/School no    Feels Threatened by Someone no    Does Anyone Try to Keep You From Having Contact with Others or Doing Things Outside Your Home? no    Physical Signs of Abuse Present no                   Legal       Row Name 01/31/25 0953       Financial Resource Strain    How hard is it for you to pay for the very basics like food, housing, medical care, and heating? Not hard       Financial/Legal    Source of Income salary/wages                   Substance Abuse       Row Name 01/31/25 0953       Substance Use    Substance Use Status never used                   Patient Forms    No documentation.                     Cony Macias RN

## 2025-01-31 NOTE — H&P
[facility]   HISTORY AND PHYSICAL      Name:  Dwayne Denver Short   Age:  54 y.o.  Sex:  male  :  1970  MRN:  [id number]   Visit Number:  [id number]  Admission Date:  2025  Date Of Service:  25  Primary Care Physician:  Taniya Moise DO    Chief Complaint:     Abdominal discomfort    History Of Presenting Illness:        54-year-old male past medical history: Diverticulitis, myocardial infarction, prediabetes.  Recently admitted for concern for ileus versus PSBO.  Was discharged yesterday afternoon after Dr. Vega's outpatient did not think he was having bowel obstruction thought his symptoms were attributable to Mounjaro.  Patient returned with worsening abdominal pain and distention.  Repeat imaging concern for SBO.  Full code.    Pertinent findings: Creatinine 1.25, lactate 1.4, lipase 17, WBC 14.53, CT abdomen suggestive of SBO, increased distention of the small bowel.  Urinalysis 100 mg/dL glucose 15 mg/dL ketones    ED Medications:    Medications   sodium chloride 0.9 % flush 10 mL (has no administration in time range)   sodium chloride 0.9 % flush 10 mL (has no administration in time range)   sodium chloride 0.9 % bolus 1,000 mL (0 mL Intravenous Stopped 25 0117)   ondansetron (ZOFRAN) injection 8 mg (8 mg Intravenous Given 25 2308)   morphine injection 4 mg (4 mg Intravenous Given 25 2308)   iopamidol (ISOVUE-300) 61 % injection 100 mL (100 mL Intravenous Given 25 0048)   morphine injection 4 mg (4 mg Intravenous Given 25 0141)       Edited by: Abelardo Tomas DO at 2025 0514     Review Of Systems:    All systems were reviewed and negative except as mentioned in history of presenting illness, assessment and plan.    Past Medical History: Patient  has a past medical history of Diverticulitis and Myocardial infarction.    Past Surgical History: Patient  has no past surgical history on file.    Social History: Patient   "reports that he has never smoked. He has never used smokeless tobacco. He reports current alcohol use. He reports that he does not use drugs.    Family History:  Patient's family history has been reviewed and found to be noncontributory.     Allergies:      Codeine    Home Medications:    Prior to Admission Medications       Prescriptions Last Dose Informant Patient Reported? Taking?    ondansetron ODT (ZOFRAN-ODT) 4 MG disintegrating tablet   No No    Place 1 tablet on the tongue 4 (Four) Times a Day As Needed for Nausea.              Vital Signs:  Temp:  [98.2 °F (36.8 °C)] 98.2 °F (36.8 °C)  Heart Rate:  [66-90] 83  Resp:  [13-20] 20  BP: (114-137)/(65-81) 117/71        01/30/25 2207   Weight: 120 kg (265 lb)     Body mass index is 34.96 kg/m².    Physical Exam:     Most recent vital Signs: /71   Pulse 83   Temp 98.2 °F (36.8 °C) (Oral)   Resp 20   Ht 185.4 cm (73\")   Wt 120 kg (265 lb)   SpO2 97%   BMI 34.96 kg/m²     Constitutional: Awake, alert  Eyes: PERRLA, sclerae anicteric, no conjunctival injection  HENT: NCAT, mucous membranes moist  Neck: Supple, no thyromegaly, no lymphadenopathy, trachea midline  Respiratory: Clear to auscultation bilaterally, nonlabored respirations   Cardiovascular: RRR, no murmurs, rubs, or gallops, palpable pedal pulses bilaterally  Gastrointestinal: Positive bowel sounds, soft, moderately tender, mildly distended, NGT in place  Musculoskeletal: No bilateral ankle edema, no clubbing or cyanosis to extremities  Psychiatric: Appropriate affect, cooperative  Neurologic: Oriented x 3, speech clear  Skin: No rashes  Edited by: Abelardo Tomas DO at 1/31/2025 0618      Laboratory data:    I have reviewed the labs done in the emergency room.    Results from last 7 days   Lab Units 01/30/25 2223 01/30/25  0905 01/29/25  2348   SODIUM mmol/L 142 138 133*   POTASSIUM mmol/L 3.9 3.9 3.4*   CHLORIDE mmol/L 102 104 94*   CO2 mmol/L 26.0 24.8 25.1   BUN mg/dL 6 8 9 "   CREATININE mg/dL 1.25 0.97 1.40*   CALCIUM mg/dL 9.6 8.4* 9.8   BILIRUBIN mg/dL 0.7 0.5 0.9   ALK PHOS U/L 76 70 78   ALT (SGPT) U/L 36 37 47*   AST (SGOT) U/L 22 22 32   GLUCOSE mg/dL 150* 151* 226*     Results from last 7 days   Lab Units 01/30/25  2223 01/30/25  0905 01/29/25  2348   WBC 10*3/mm3 14.53* 12.51* 13.13*   HEMOGLOBIN g/dL 17.0 15.2 16.5   HEMATOCRIT % 49.7 43.9 48.4   PLATELETS 10*3/mm3 259 216 252         Results from last 7 days   Lab Units 01/30/25  0046 01/29/25  2348   HSTROP T ng/L 7 7             Results from last 7 days   Lab Units 01/30/25  2223   LIPASE U/L 17         Results from last 7 days   Lab Units 01/30/25  0424   COLOR UA  Yellow   GLUCOSE UA  100 mg/dL (Trace)*   KETONES UA  15 mg/dL (1+)*   BLOOD UA  Negative   LEUKOCYTES UA  Negative   PH, URINE  6.0   BILIRUBIN UA  Negative   UROBILINOGEN UA  1.0 E.U./dL       Pain Management Panel           No data to display                EKG:      NSR RBBB stable    Radiology:    XR Abdomen 1 View    Result Date: 1/31/2025  FINAL REPORT TECHNIQUE: null CLINICAL HISTORY: ng placement COMPARISON: null FINDINGS: Abdomen 1 view Comparison: 01/30/2025. Findings: Supine view of portion of abdomen submitted. Pelvis not included. Nasogastric tube tip projects over left upper quadrant of abdomen in the region of the stomach. Distended small bowel increased.     Impression: 1. NG tube tip projects over stomach. 2. Increased small bowel distention. Authenticated and Electronically Signed by Emerson Escobedo MD on 01/31/2025 05:28:10 AM    CT Abdomen Pelvis With Contrast    Addendum Date: 1/31/2025    ADDENDUM REPORT ADDENDUM: This report was discussed with Edmundo Tinajero MD on Jan 31, 2025 01:15:00 EST. Authenticated and Electronically Signed by Johnnie Stevens MD on 01/31/2025 01:16:37 AM    Result Date: 1/31/2025  FINAL REPORT TECHNIQUE: null CLINICAL HISTORY: Mid-epigastric abd pain, abd distention, possible SBO on CT from last night COMPARISON: null  FINDINGS: CT abdomen and pelvis with contrast Comparison: CR - XR ABDOMEN 1 VW - 1/30/25 01:34 EST CT/SR - CT ABDOMEN PELVIS W CONTRAST - 1/30/25 00:07 EST Findings: There is increased bibasilar atelectasis. There is mild hepatic steatosis. New faint hyperdensity layering within the gallbladder is consistent with vicarious excretion of contrast. Gallbladder is otherwise unremarkable. The pancreas, adrenal glands, and kidneys are unremarkable. There are calcified splenic granulomas. NG tube is been removed. There is slightly increased distention of small bowel in the mid and lower abdomen. There is no completely decompressed small bowel in the right lower quadrant with more abrupt transition consistent with small-bowel obstruction. There is mild mesenteric edema adjacent to dilated small bowel loops. There is mild colonic diverticulosis. The aorta is normal in diameter. IVC is widely patent. There are no enlarged lymph nodes. There is a small fat containing periumbilical hernia. There is no fracture or suspicious lytic or sclerotic lesion.     IMPRESSION: 1. Findings consistent with small-bowel obstruction with transition point in the right lower quadrant and slightly increased distention of small bowel. 2. Chronic findings as above. Authenticated and Electronically Signed by Johnnie Stevens MD on 01/31/2025 01:13:37 AM    XR Abdomen 1 View    Result Date: 1/30/2025  FINAL REPORT TECHNIQUE: null CLINICAL HISTORY: NG tube inserted COMPARISON: null FINDINGS: 1 view abdomen Comparison: CT/SR - CT ABDOMEN PELVIS W CONTRAST - 1/30/25 00:07 EST Findings: NG tube is in the stomach.     Impression: NG tube is in the stomach. Authenticated and Electronically Signed by Johnnie Stevens MD on 01/30/2025 02:34:52 AM    CT Abdomen Pelvis With Contrast    Addendum Date: 1/30/2025    ADDENDUM REPORT ADDENDUM: This report was discussed with Dr. Eduin Faria MD on Jan 30, 2025 00:58:00 EST. Authenticated and Electronically Signed  by Johnnie Stevens MD on 01/30/2025 12:59:22 AM    Result Date: 1/30/2025  FINAL REPORT TECHNIQUE: null CLINICAL HISTORY: Epigastric and generalized abdominal pain, history of diverticulitis,, eval fo COMPARISON: null FINDINGS: CT abdomen and pelvis with contrast Comparison: None Findings: There is mild bibasilar atelectasis. There is mild hepatic steatosis. The gallbladder, pancreas, adrenal glands, and kidneys are unremarkable. There are calcified splenic granulomas. The stomach is mildly distended. The small bowel is mildly distended. There is transition to normal diameter small bowel within the right lower quadrant. The appendix is normal. There is mild colonic diverticulosis. There is no acute diverticulitis. There is no free fluid or free air. The aorta is normal in diameter. IVC is widely patent. There are no enlarged lymph nodes. There is no fracture or suspicious lytic or sclerotic lesion.     IMPRESSION: 1. Mildly distended stomach and small bowel with transition to normal diameter small bowel in the right lower quadrant consistent with partial small bowel obstruction, ileus, or gastroenteritis. 2. Mild colonic diverticulosis. 3. Mild hepatic steatosis. Authenticated and Electronically Signed by Johnnie Stevens MD on 01/30/2025 12:55:13 AM     Assessment/Plan:      SBO (small bowel obstruction)  -- Patient unfortunately with return of symptoms including abdominal distention and nausea after discharge.  Concern was that obstruction may have been caused by GLP-1 use.  -- Active Treatments: N.p.o., IV fluids, antiemetics, NG tube placed in ED, morphine for pain control  -- Pending Results: A.m. labs, consult to Dr. Vega            DVT Prophylaxis: Lovenox  Code Status: Full code  Diet: N.p.o.  Risk assessment: Moderate anticipate less than two night stay            Edited by: Abelardo Tomas DO at 1/31/2025 0618           Advance Care Planning   ACP discussion was held with the patient during this  visit. Patient does not have an advance directive, declines further assistance.           Abelardo Tomas DO  01/31/25  06:18 EST    Dictated utilizing Dragon dictation.

## 2025-01-31 NOTE — PROGRESS NOTES
LOS: 0 days   Patient Care Team:  Taniya Moise DO as PCP - General (Family Medicine)      Chief Complaint: patient states that he is feeling better, he is passing flatus, he had a bowel movement last night at 5 pm      Interval History:     Patient Complaints: See Above, passing flatus, passing stool    History taken from: patient family RN    Vital Signs  Temp:  [98.2 °F (36.8 °C)-98.6 °F (37 °C)] 98.6 °F (37 °C)  Heart Rate:  [83] 83  Resp:  [12-20] 12  BP: (117-137)/(70-81) 128/70    Physical Exam:     General Appearance:    Alert, cooperative, in no acute distress   Head:    Normocephalic, without obvious abnormality, atraumatic   Lungs:     Clear to auscultation,respirations regular, even and                  unlabored    Heart:    Regular rhythm and normal rate, normal S1 and S2, no            murmur, no gallop, no rub, no click   Abdomen:     Normal bowel sounds, no masses, no organomegaly, soft        non-tender, non-distended, no guarding, no rebound                tenderness   Extremities:   Moves all extremities well, no edema, no cyanosis, no             redness   Pulses:   Pulses palpable and equal bilaterally   Skin:   No bleeding, bruising or rash        Results Review:       Lab Results (last 24 hours)       Procedure Component Value Units Date/Time    Comprehensive Metabolic Panel [[id number]]  (Abnormal) Collected: 01/31/25 0707    Specimen: Blood Updated: 01/31/25 0734     Glucose 137 mg/dL      BUN 5 mg/dL      Creatinine 1.12 mg/dL      Sodium 141 mmol/L      Potassium 4.4 mmol/L      Chloride 105 mmol/L      CO2 28.0 mmol/L      Calcium 8.5 mg/dL      Total Protein 6.0 g/dL      Albumin 3.9 g/dL      ALT (SGPT) 27 U/L      AST (SGOT) 16 U/L      Alkaline Phosphatase 61 U/L      Total Bilirubin 0.5 mg/dL      Globulin 2.1 gm/dL      A/G Ratio 1.9 g/dL      BUN/Creatinine Ratio 4.5     Anion Gap 8.0 mmol/L      eGFR 77.6 mL/min/1.73     Narrative:      GFR Categories in Chronic  Kidney Disease (CKD)      GFR Category          GFR (mL/min/1.73)    Interpretation  G1                     90 or greater         Normal or high (1)  G2                      60-89                Mild decrease (1)  G3a                   45-59                Mild to moderate decrease  G3b                   30-44                Moderate to severe decrease  G4                    15-29                Severe decrease  G5                    14 or less           Kidney failure          (1)In the absence of evidence of kidney disease, neither GFR category G1 or G2 fulfill the criteria for CKD.    eGFR calculation 2021 CKD-EPI creatinine equation, which does not include race as a factor    CBC (No Diff) [[id number]]  (Abnormal) Collected: 01/31/25 0707    Specimen: Blood Updated: 01/31/25 0721     WBC 11.23 10*3/mm3      RBC 4.75 10*6/mm3      Hemoglobin 14.7 g/dL      Hematocrit 44.1 %      MCV 92.8 fL      MCH 30.9 pg      MCHC 33.3 g/dL      RDW 12.5 %      RDW-SD 43.0 fl      MPV 10.0 fL      Platelets 208 10*3/mm3     Lactic Acid, Plasma [[id number]]  (Normal) Collected: 01/30/25 2232    Specimen: Blood Updated: 01/30/25 2316     Lactate 1.4 mmol/L     Comprehensive Metabolic Panel [[id number]]  (Abnormal) Collected: 01/30/25 2223    Specimen: Blood Updated: 01/30/25 2304     Glucose 150 mg/dL      BUN 6 mg/dL      Creatinine 1.25 mg/dL      Sodium 142 mmol/L      Potassium 3.9 mmol/L      Chloride 102 mmol/L      CO2 26.0 mmol/L      Calcium 9.6 mg/dL      Total Protein 7.2 g/dL      Albumin 4.5 g/dL      ALT (SGPT) 36 U/L      AST (SGOT) 22 U/L      Alkaline Phosphatase 76 U/L      Total Bilirubin 0.7 mg/dL      Globulin 2.7 gm/dL      A/G Ratio 1.7 g/dL      BUN/Creatinine Ratio 4.8     Anion Gap 14.0 mmol/L      eGFR 68.0 mL/min/1.73     Narrative:      GFR Categories in Chronic Kidney Disease (CKD)      GFR Category          GFR (mL/min/1.73)    Interpretation  G1                     90 or greater         Normal or  high (1)  G2                      60-89                Mild decrease (1)  G3a                   45-59                Mild to moderate decrease  G3b                   30-44                Moderate to severe decrease  G4                    15-29                Severe decrease  G5                    14 or less           Kidney failure          (1)In the absence of evidence of kidney disease, neither GFR category G1 or G2 fulfill the criteria for CKD.    eGFR calculation 2021 CKD-EPI creatinine equation, which does not include race as a factor    Lipase [[id number]]  (Normal) Collected: 01/30/25 2223    Specimen: Blood Updated: 01/30/25 2304     Lipase 17 U/L     CBC & Differential [[id number]]  (Abnormal) Collected: 01/30/25 2223    Specimen: Blood Updated: 01/30/25 2302    Narrative:      The following orders were created for panel order CBC & Differential.  Procedure                               Abnormality         Status                     ---------                               -----------         ------                     CBC Auto Differential[[id number]]        Abnormal            Final result                 Please view results for these tests on the individual orders.    CBC Auto Differential [[id number]]  (Abnormal) Collected: 01/30/25 2223    Specimen: Blood Updated: 01/30/25 2302     WBC 14.53 10*3/mm3      RBC 5.41 10*6/mm3      Hemoglobin 17.0 g/dL      Hematocrit 49.7 %      MCV 91.9 fL      MCH 31.4 pg      MCHC 34.2 g/dL      RDW 12.6 %      RDW-SD 42.5 fl      MPV 10.6 fL      Platelets 259 10*3/mm3      Neutrophil % 83.1 %      Lymphocyte % 10.0 %      Monocyte % 6.1 %      Eosinophil % 0.2 %      Basophil % 0.3 %      Immature Grans % 0.3 %      Neutrophils, Absolute 12.06 10*3/mm3      Lymphocytes, Absolute 1.46 10*3/mm3      Monocytes, Absolute 0.89 10*3/mm3      Eosinophils, Absolute 0.03 10*3/mm3      Basophils, Absolute 0.04 10*3/mm3      Immature Grans, Absolute 0.05 10*3/mm3      nRBC 0.0 /100  WBC     [place] Draw [[id number]] Collected: 01/30/25 2223    Specimen: Blood Updated: 01/30/25 2231    Narrative:      The following orders were created for panel order [place] Draw.  Procedure                               Abnormality         Status                     ---------                               -----------         ------                     Green Top (Gel)[[id number]]                                  Final result               Lavender Top[[id number]]                                     Final result               Gold Top - SST[[id number]]                                   Final result               Light Blue Top[[id number]]                                   Final result                 Please view results for these tests on the individual orders.    Green Top (Gel) [[id number]] Collected: 01/30/25 2223    Specimen: Blood Updated: 01/30/25 2231     Extra Tube Hold for add-ons.     Comment: Auto resulted.       Lavender Top [[id number]] Collected: 01/30/25 2223    Specimen: Blood Updated: 01/30/25 2231     Extra Tube hold for add-on     Comment: Auto resulted       Gold Top - SST [[id number]] Collected: 01/30/25 2223    Specimen: Blood Updated: 01/30/25 2231     Extra Tube Hold for add-ons.     Comment: Auto resulted.       Light Blue Top [[id number]] Collected: 01/30/25 2223    Specimen: Blood Updated: 01/30/25 2231     Extra Tube Hold for add-ons.     Comment: Auto resulted               I have reviewed his records, I looked at the CT scan done at 1:00 this morning and ordered by myself around 830 there is some ileus type pattern no contrast in the colon but I do not think the patient has evidence of a bowel obstruction.  I have reviewed this also with the radiologist.    Assessment & Plan       SBO (small bowel obstruction)    Bowel obstruction      The patient was discharged yesterday and was felt to have an ileus most likely from a GLP inhibitor.  He came back to the emergency room last night with  abdominal discomfort and abdominal distention, repeat CT scan this morning without contrast shows that the oral contrast has worked its way into the jejunum but not into the colon yet.    I had a clear detailed and extensive discussion with the hospitalist several times over the phone this morning and of also discussed situation with the patient and the entire family at the bedside.  From a clinical standpoint I do not think the patient has a bowel obstruction, he is passing flatus, he had a bowel movement last night, his abdomen is soft and nontender, he is slightly distended but I would just wait watch and see how he does.  I highly doubt that he is going to require exploratory laparotomy, the family wants the patient to leave his NG tube in place which I think is reasonable and we will reassess him tomorrow.    CT scan shows no evidence of hernia, the patient has no previous history of abdominal surgery, although he does have a history of diverticulitis in the past there is no evidence of diverticulitis at this time.      David Vega MD  01/31/25  13:21 EST

## 2025-01-31 NOTE — PROGRESS NOTES
[facility]    Name:  Dwayne Denver Short   Age:  54 y.o.  Sex:  male  :  1970  MRN:  [id number]   Visit Number:  [id number]  Admission Date:  2025  Date Of Service:  25  Primary Care Physician:  Taniya Moise DO    Patient was seen and examined. Pertinent laboratory and radiology data were reviewed.  Patient with NG tube, just completed repeat CT scan of abdomen pelvis.  States felt well after discharge until about 7 PM.  Denied fever or chills.  Advised Dr. Vega would continue to follow as well.  Otherwise reassuring labs and vitals noted.  Will continue supportive care with NG tube/antiemetics/pain control/IV fluids.  Encouraged ambulation.    Vital signs:    Vital Signs (last 24 hours)          0700   0659  0700   1312   Most Recent      Temp (°F)   98.2      98.6     98.6 (37)  0900    Heart Rate   83       83  2207    Resp   20      12     12  0900    /71 -  137/81      128/70     128/70  0900    SpO2 (%) 93 -  97    91 -  96     94  1028    Flow (L/min) (Oxygen Therapy)   2      2     2  1028            Rosa Maria Diego, APRN  25  13:12 EST    Dictated utilizing Dragon dictation.

## 2025-01-31 NOTE — PAYOR COMM NOTE
"TO:BC  FROM:THIEN MUHAMMAD, RN PHONE [phone]  CLINICALS REF# [id number]    Short, Dwayne Denver (54 y.o. Male)       Date of Birth   1970    Social Security Number       Address   [address] [place] [id number]    Home Phone   [phone]    MRN   [id number]       Confucianist   Mormonism    Marital Status   Single                            Admission Date   25    Admission Type   Emergency    Admitting Provider   Abelardo Tomas DO    Attending Provider   Abelardo Tomas DO    Department, Room/Bed   [facility] EMERGENCY DEPARTMENT,        Discharge Date       Discharge Disposition       Discharge Destination                                 Attending Provider: Abelardo Tomas DO    Allergies: Codeine    Isolation: None   Infection: None   Code Status: CPR    Ht: 185.4 cm (73\")   Wt: 120 kg (265 lb)    Admission Cmt: None   Principal Problem: SBO (small bowel obstruction) [K56.609]                   Active Insurance as of 2025       Primary Coverage       Payor Plan Insurance Group Employer/Plan Group    [employer] BLUE SHIELD PPO [id number]       Payor Plan Address Payor Plan Phone Number Payor Plan Fax Number Effective Dates    [phone]  2025 - None Entered    [place] [address]         Subscriber Name Subscriber Birth Date Member ID       SHORT,DWAYNE DENVER 1970 [id number]                     Emergency Contacts        (Rel.) Home Phone Work Phone Mobile Phone    Petrona Neal (Mother) [phone] -- --                 History & Physical        Abelardo Tomas DO at 25 0618            [facility] HOSPITALIST   HISTORY AND PHYSICAL      Name:  Dwayne Denver Short   Age:  54 y.o.  Sex:  male  :  1970  MRN:  [id number]   Visit Number:  [id number]  Admission Date:  2025  Date Of Service:  25  Primary Care Physician:  Taniya Moise " DO DAMON    Chief Complaint:     Abdominal discomfort    History Of Presenting Illness:        54-year-old male past medical history: Diverticulitis, myocardial infarction, prediabetes.  Recently admitted for concern for ileus versus PSBO.  Was discharged yesterday afternoon after Dr. Vega's outpatient did not think he was having bowel obstruction thought his symptoms were attributable to Mounjaro.  Patient returned with worsening abdominal pain and distention.  Repeat imaging concern for SBO.  Full code.    Pertinent findings: Creatinine 1.25, lactate 1.4, lipase 17, WBC 14.53, CT abdomen suggestive of SBO, increased distention of the small bowel.  Urinalysis 100 mg/dL glucose 15 mg/dL ketones    ED Medications:    Medications   sodium chloride 0.9 % flush 10 mL (has no administration in time range)   sodium chloride 0.9 % flush 10 mL (has no administration in time range)   sodium chloride 0.9 % bolus 1,000 mL (0 mL Intravenous Stopped 1/31/25 0117)   ondansetron (ZOFRAN) injection 8 mg (8 mg Intravenous Given 1/30/25 2308)   morphine injection 4 mg (4 mg Intravenous Given 1/30/25 2308)   iopamidol (ISOVUE-300) 61 % injection 100 mL (100 mL Intravenous Given 1/31/25 0048)   morphine injection 4 mg (4 mg Intravenous Given 1/31/25 0141)       Edited by: Abelardo Tomas DO at 1/31/2025 0514     Review Of Systems:    All systems were reviewed and negative except as mentioned in history of presenting illness, assessment and plan.    Past Medical History: Patient  has a past medical history of Diverticulitis and Myocardial infarction.    Past Surgical History: Patient  has no past surgical history on file.    Social History: Patient  reports that he has never smoked. He has never used smokeless tobacco. He reports current alcohol use. He reports that he does not use drugs.    Family History:  Patient's family history has been reviewed and found to be noncontributory.     Allergies:      Codeine    Home  "Medications:    Prior to Admission Medications       Prescriptions Last Dose Informant Patient Reported? Taking?    ondansetron ODT (ZOFRAN-ODT) 4 MG disintegrating tablet   No No    Place 1 tablet on the tongue 4 (Four) Times a Day As Needed for Nausea.              Vital Signs:  Temp:  [98.2 °F (36.8 °C)] 98.2 °F (36.8 °C)  Heart Rate:  [66-90] 83  Resp:  [13-20] 20  BP: (114-137)/(65-81) 117/71        01/30/25  2207   Weight: 120 kg (265 lb)     Body mass index is 34.96 kg/m².    Physical Exam:     Most recent vital Signs: /71   Pulse 83   Temp 98.2 °F (36.8 °C) (Oral)   Resp 20   Ht 185.4 cm (73\")   Wt 120 kg (265 lb)   SpO2 97%   BMI 34.96 kg/m²     Constitutional: Awake, alert  Eyes: PERRLA, sclerae anicteric, no conjunctival injection  HENT: NCAT, mucous membranes moist  Neck: Supple, no thyromegaly, no lymphadenopathy, trachea midline  Respiratory: Clear to auscultation bilaterally, nonlabored respirations   Cardiovascular: RRR, no murmurs, rubs, or gallops, palpable pedal pulses bilaterally  Gastrointestinal: Positive bowel sounds, soft, moderately tender, mildly distended, NGT in place  Musculoskeletal: No bilateral ankle edema, no clubbing or cyanosis to extremities  Psychiatric: Appropriate affect, cooperative  Neurologic: Oriented x 3, speech clear  Skin: No rashes  Edited by: Abelardo Tomas DO at 1/31/2025 0618      Laboratory data:    I have reviewed the labs done in the emergency room.    Results from last 7 days   Lab Units 01/30/25  2223 01/30/25  0905 01/29/25  2348   SODIUM mmol/L 142 138 133*   POTASSIUM mmol/L 3.9 3.9 3.4*   CHLORIDE mmol/L 102 104 94*   CO2 mmol/L 26.0 24.8 25.1   BUN mg/dL 6 8 9   CREATININE mg/dL 1.25 0.97 1.40*   CALCIUM mg/dL 9.6 8.4* 9.8   BILIRUBIN mg/dL 0.7 0.5 0.9   ALK PHOS U/L 76 70 78   ALT (SGPT) U/L 36 37 47*   AST (SGOT) U/L 22 22 32   GLUCOSE mg/dL 150* 151* 226*     Results from last 7 days   Lab Units 01/30/25 2223 01/30/25  0905 " 01/29/25  2348   WBC 10*3/mm3 14.53* 12.51* 13.13*   HEMOGLOBIN g/dL 17.0 15.2 16.5   HEMATOCRIT % 49.7 43.9 48.4   PLATELETS 10*3/mm3 259 216 252         Results from last 7 days   Lab Units 01/30/25  0046 01/29/25  2348   HSTROP T ng/L 7 7             Results from last 7 days   Lab Units 01/30/25  2223   LIPASE U/L 17         Results from last 7 days   Lab Units 01/30/25  0424   COLOR UA  Yellow   GLUCOSE UA  100 mg/dL (Trace)*   KETONES UA  15 mg/dL (1+)*   BLOOD UA  Negative   LEUKOCYTES UA  Negative   PH, URINE  6.0   BILIRUBIN UA  Negative   UROBILINOGEN UA  1.0 E.U./dL       Pain Management Panel           No data to display                EKG:      NSR RBBB stable    Radiology:    XR Abdomen 1 View    Result Date: 1/31/2025  FINAL REPORT TECHNIQUE: null CLINICAL HISTORY: ng placement COMPARISON: null FINDINGS: Abdomen 1 view Comparison: 01/30/2025. Findings: Supine view of portion of abdomen submitted. Pelvis not included. Nasogastric tube tip projects over left upper quadrant of abdomen in the region of the stomach. Distended small bowel increased.     Impression: 1. NG tube tip projects over stomach. 2. Increased small bowel distention. Authenticated and Electronically Signed by Emerson Escobedo MD on 01/31/2025 05:28:10 AM    CT Abdomen Pelvis With Contrast    Addendum Date: 1/31/2025    ADDENDUM REPORT ADDENDUM: This report was discussed with Edmundo Tinajero MD on Jan 31, 2025 01:15:00 EST. Authenticated and Electronically Signed by Johnnie Stevens MD on 01/31/2025 01:16:37 AM    Result Date: 1/31/2025  FINAL REPORT TECHNIQUE: null CLINICAL HISTORY: Mid-epigastric abd pain, abd distention, possible SBO on CT from last night COMPARISON: null FINDINGS: CT abdomen and pelvis with contrast Comparison: CR - XR ABDOMEN 1 VW - 1/30/25 01:34 EST CT/SR - CT ABDOMEN PELVIS W CONTRAST - 1/30/25 00:07 EST Findings: There is increased bibasilar atelectasis. There is mild hepatic steatosis. New faint hyperdensity layering  within the gallbladder is consistent with vicarious excretion of contrast. Gallbladder is otherwise unremarkable. The pancreas, adrenal glands, and kidneys are unremarkable. There are calcified splenic granulomas. NG tube is been removed. There is slightly increased distention of small bowel in the mid and lower abdomen. There is no completely decompressed small bowel in the right lower quadrant with more abrupt transition consistent with small-bowel obstruction. There is mild mesenteric edema adjacent to dilated small bowel loops. There is mild colonic diverticulosis. The aorta is normal in diameter. IVC is widely patent. There are no enlarged lymph nodes. There is a small fat containing periumbilical hernia. There is no fracture or suspicious lytic or sclerotic lesion.     IMPRESSION: 1. Findings consistent with small-bowel obstruction with transition point in the right lower quadrant and slightly increased distention of small bowel. 2. Chronic findings as above. Authenticated and Electronically Signed by Johnnie Stevens MD on 01/31/2025 01:13:37 AM    XR Abdomen 1 View    Result Date: 1/30/2025  FINAL REPORT TECHNIQUE: null CLINICAL HISTORY: NG tube inserted COMPARISON: null FINDINGS: 1 view abdomen Comparison: CT/SR - CT ABDOMEN PELVIS W CONTRAST - 1/30/25 00:07 EST Findings: NG tube is in the stomach.     Impression: NG tube is in the stomach. Authenticated and Electronically Signed by Johnnie Stevens MD on 01/30/2025 02:34:52 AM    CT Abdomen Pelvis With Contrast    Addendum Date: 1/30/2025    ADDENDUM REPORT ADDENDUM: This report was discussed with Dr. Eduin Faria MD on Jan 30, 2025 00:58:00 EST. Authenticated and Electronically Signed by Johnnie Stevens MD on 01/30/2025 12:59:22 AM    Result Date: 1/30/2025  FINAL REPORT TECHNIQUE: null CLINICAL HISTORY: Epigastric and generalized abdominal pain, history of diverticulitis,, eval fo COMPARISON: null FINDINGS: CT abdomen and pelvis with contrast  Comparison: None Findings: There is mild bibasilar atelectasis. There is mild hepatic steatosis. The gallbladder, pancreas, adrenal glands, and kidneys are unremarkable. There are calcified splenic granulomas. The stomach is mildly distended. The small bowel is mildly distended. There is transition to normal diameter small bowel within the right lower quadrant. The appendix is normal. There is mild colonic diverticulosis. There is no acute diverticulitis. There is no free fluid or free air. The aorta is normal in diameter. IVC is widely patent. There are no enlarged lymph nodes. There is no fracture or suspicious lytic or sclerotic lesion.     IMPRESSION: 1. Mildly distended stomach and small bowel with transition to normal diameter small bowel in the right lower quadrant consistent with partial small bowel obstruction, ileus, or gastroenteritis. 2. Mild colonic diverticulosis. 3. Mild hepatic steatosis. Authenticated and Electronically Signed by Johnnie Stevens MD on 01/30/2025 12:55:13 AM     Assessment/Plan:      SBO (small bowel obstruction)  -- Patient unfortunately with return of symptoms including abdominal distention and nausea after discharge.  Concern was that obstruction may have been caused by GLP-1 use.  -- Active Treatments: N.p.o., IV fluids, antiemetics, NG tube placed in ED, morphine for pain control  -- Pending Results: A.m. labs, consult to Dr. Vega            DVT Prophylaxis: Lovenox  Code Status: Full code  Diet: N.p.o.  Risk assessment: Moderate anticipate less than two night stay            Edited by: Abelardo Tomas DO at 1/31/2025 0618           Advance Care Planning  ACP discussion was held with the patient during this visit. Patient does not have an advance directive, declines further assistance.           Abelardo Tomas DO  01/31/25  06:18 EST    Dictated utilizing Dragon dictation.      Electronically signed by Abelardo Tomas DO at 01/31/25 0621          Emergency  Department Notes        Janis Blum RN at 2518          Dr. Tomas at bedside    Electronically signed by Janis Blum RN at 25       Edmundo Tinajero MD at 25 2239           EMERGENCY DEPARTMENT ENCOUNTER    Pt Name: Dwayne Denver Short  MRN: [id number]  Pt :   1970  Room Number:    Date of encounter:  2025  PCP: Taniya Moise DO  ED Provider: Edmundo Tinajero MD    Historian: Patient      HPI:  Chief Complaint: Abdominal pain, nausea        Context: Dwayne Denver Short is a 54 y.o. male who presents to the ED c/o abdominal pain and nausea.  Patient with past history significant for diabetes, heart disease and diverticulitis.  Patient hospitalized this morning for gastroenteritis versus ileus versus small bowel obstruction.  Patient had NG tube placed.  Patient symptoms thought to be secondary to Mounjaro side effects.  NG tube discontinued and patient discharged with instruction to discontinue Mounjaro as well.  Instructed to follow-up with PCP within 1 week.    Patient returns to the emerged part this evening stating that he is having worsening midepigastric abdominal pain that radiates to the right lower quadrant.  He says he is feeling nauseous and has been belching.  He denies vomiting.  He says his last bowel movement was this evening but it was very small and has been passing flatus.    PAST MEDICAL HISTORY  Past Medical History:   Diagnosis Date    Diverticulitis     Myocardial infarction          PAST SURGICAL HISTORY  No past surgical history on file.      FAMILY HISTORY  No family history on file.      SOCIAL HISTORY  Social History     Socioeconomic History    Marital status: Single   Tobacco Use    Smoking status: Never    Smokeless tobacco: Never   Vaping Use    Vaping status: Never Used   Substance and Sexual Activity    Alcohol use: Yes     Comment: occasionally    Drug use: No    Sexual activity: Defer         ALLERGIES  Codeine        REVIEW OF  SYSTEMS    All systems reviewed and negative except for those discussed in HPI.       PHYSICAL EXAM    I have reviewed the triage vital signs and nursing notes.    ED Triage Vitals [01/30/25 2207]   Temp Heart Rate Resp BP SpO2   98.2 °F (36.8 °C) 83 20 137/81 97 %      Temp src Heart Rate Source Patient Position BP Location FiO2 (%)   Oral Monitor Sitting Left arm --         General: Moderate acute distress  Skin: normal color, warm and dry  Head: normocephalic, atraumatic  Eyes: Pupils equally round and reactive to light.  Nose: normal nasal mucosa, no visible deformity.  Mouth: dry mucous membranes.  Neck: supple.  Chest: no retractions, no visible deformity  Cardiovascular: Regular rate and rhythm.  Lungs: clear to auscultation bilaterally.  Abdomen: soft, tender to palpation in the midepigastrium and right lower quadrant, moderately-distended. No rebound tenderness, no guarding.  No peritonitis.  Reducible umbilical hernia.  Neuro:  alert and oriented x3, no focal neurological deficits.  Psych:  appropriate mood and behavior.        LAB RESULTS  Recent Results (from the past 24 hours)   POC Glucose Once    Collection Time: 01/30/25  6:54 AM    Specimen: Blood   Result Value Ref Range    Glucose 142 (H) 70 - 130 mg/dL   CBC (No Diff)    Collection Time: 01/30/25  9:05 AM    Specimen: Blood   Result Value Ref Range    WBC 12.51 (H) 3.40 - 10.80 10*3/mm3    RBC 4.82 4.14 - 5.80 10*6/mm3    Hemoglobin 15.2 13.0 - 17.7 g/dL    Hematocrit 43.9 37.5 - 51.0 %    MCV 91.1 79.0 - 97.0 fL    MCH 31.5 26.6 - 33.0 pg    MCHC 34.6 31.5 - 35.7 g/dL    RDW 12.2 (L) 12.3 - 15.4 %    RDW-SD 40.9 37.0 - 54.0 fl    MPV 10.2 6.0 - 12.0 fL    Platelets 216 140 - 450 10*3/mm3   Comprehensive Metabolic Panel    Collection Time: 01/30/25  9:05 AM    Specimen: Blood   Result Value Ref Range    Glucose 151 (H) 65 - 99 mg/dL    BUN 8 6 - 20 mg/dL    Creatinine 0.97 0.76 - 1.27 mg/dL    Sodium 138 136 - 145 mmol/L    Potassium 3.9 3.5 -  5.2 mmol/L    Chloride 104 98 - 107 mmol/L    CO2 24.8 22.0 - 29.0 mmol/L    Calcium 8.4 (L) 8.6 - 10.5 mg/dL    Total Protein 6.2 6.0 - 8.5 g/dL    Albumin 4.0 3.5 - 5.2 g/dL    ALT (SGPT) 37 1 - 41 U/L    AST (SGOT) 22 1 - 40 U/L    Alkaline Phosphatase 70 39 - 117 U/L    Total Bilirubin 0.5 0.0 - 1.2 mg/dL    Globulin 2.2 gm/dL    A/G Ratio 1.8 g/dL    BUN/Creatinine Ratio 8.2 7.0 - 25.0    Anion Gap 9.2 5.0 - 15.0 mmol/L    eGFR 92.2 >60.0 mL/min/1.73   Green Top (Gel)    Collection Time: 01/30/25 10:23 PM   Result Value Ref Range    Extra Tube Hold for add-ons.    Lavender Top    Collection Time: 01/30/25 10:23 PM   Result Value Ref Range    Extra Tube hold for add-on    Gold Top - SST    Collection Time: 01/30/25 10:23 PM   Result Value Ref Range    Extra Tube Hold for add-ons.    Light Blue Top    Collection Time: 01/30/25 10:23 PM   Result Value Ref Range    Extra Tube Hold for add-ons.    Comprehensive Metabolic Panel    Collection Time: 01/30/25 10:23 PM    Specimen: Blood   Result Value Ref Range    Glucose 150 (H) 65 - 99 mg/dL    BUN 6 6 - 20 mg/dL    Creatinine 1.25 0.76 - 1.27 mg/dL    Sodium 142 136 - 145 mmol/L    Potassium 3.9 3.5 - 5.2 mmol/L    Chloride 102 98 - 107 mmol/L    CO2 26.0 22.0 - 29.0 mmol/L    Calcium 9.6 8.6 - 10.5 mg/dL    Total Protein 7.2 6.0 - 8.5 g/dL    Albumin 4.5 3.5 - 5.2 g/dL    ALT (SGPT) 36 1 - 41 U/L    AST (SGOT) 22 1 - 40 U/L    Alkaline Phosphatase 76 39 - 117 U/L    Total Bilirubin 0.7 0.0 - 1.2 mg/dL    Globulin 2.7 gm/dL    A/G Ratio 1.7 g/dL    BUN/Creatinine Ratio 4.8 (L) 7.0 - 25.0    Anion Gap 14.0 5.0 - 15.0 mmol/L    eGFR 68.0 >60.0 mL/min/1.73   Lipase    Collection Time: 01/30/25 10:23 PM    Specimen: Blood   Result Value Ref Range    Lipase 17 13 - 60 U/L   CBC Auto Differential    Collection Time: 01/30/25 10:23 PM    Specimen: Blood   Result Value Ref Range    WBC 14.53 (H) 3.40 - 10.80 10*3/mm3    RBC 5.41 4.14 - 5.80 10*6/mm3    Hemoglobin 17.0 13.0 -  17.7 g/dL    Hematocrit 49.7 37.5 - 51.0 %    MCV 91.9 79.0 - 97.0 fL    MCH 31.4 26.6 - 33.0 pg    MCHC 34.2 31.5 - 35.7 g/dL    RDW 12.6 12.3 - 15.4 %    RDW-SD 42.5 37.0 - 54.0 fl    MPV 10.6 6.0 - 12.0 fL    Platelets 259 140 - 450 10*3/mm3    Neutrophil % 83.1 (H) 42.7 - 76.0 %    Lymphocyte % 10.0 (L) 19.6 - 45.3 %    Monocyte % 6.1 5.0 - 12.0 %    Eosinophil % 0.2 (L) 0.3 - 6.2 %    Basophil % 0.3 0.0 - 1.5 %    Immature Grans % 0.3 0.0 - 0.5 %    Neutrophils, Absolute 12.06 (H) 1.70 - 7.00 10*3/mm3    Lymphocytes, Absolute 1.46 0.70 - 3.10 10*3/mm3    Monocytes, Absolute 0.89 0.10 - 0.90 10*3/mm3    Eosinophils, Absolute 0.03 0.00 - 0.40 10*3/mm3    Basophils, Absolute 0.04 0.00 - 0.20 10*3/mm3    Immature Grans, Absolute 0.05 0.00 - 0.05 10*3/mm3    nRBC 0.0 0.0 - 0.2 /100 WBC   Lactic Acid, Plasma    Collection Time: 01/30/25 10:32 PM    Specimen: Blood   Result Value Ref Range    Lactate 1.4 0.5 - 2.0 mmol/L       If labs were ordered, I independently reviewed the results and considered them in treating the patient.  See medical decision making discussion section for my interpretation of lab results.        RADIOLOGY  XR Abdomen 1 View    Result Date: 1/31/2025  FINAL REPORT TECHNIQUE: null CLINICAL HISTORY: ng placement COMPARISON: null FINDINGS: Abdomen 1 view Comparison: 01/30/2025. Findings: Supine view of portion of abdomen submitted. Pelvis not included. Nasogastric tube tip projects over left upper quadrant of abdomen in the region of the stomach. Distended small bowel increased.     Impression: 1. NG tube tip projects over stomach. 2. Increased small bowel distention. Authenticated and Electronically Signed by Emerson Escobedo MD on 01/31/2025 05:28:10 AM    CT Abdomen Pelvis With Contrast    Addendum Date: 1/31/2025    ADDENDUM REPORT ADDENDUM: This report was discussed with Edmundo Tinajero MD on Jan 31, 2025 01:15:00 EST. Authenticated and Electronically Signed by Johnnie Stevens MD on 01/31/2025  01:16:37 AM    Result Date: 1/31/2025  FINAL REPORT TECHNIQUE: null CLINICAL HISTORY: Mid-epigastric abd pain, abd distention, possible SBO on CT from last night COMPARISON: null FINDINGS: CT abdomen and pelvis with contrast Comparison: CR - XR ABDOMEN 1 VW - 1/30/25 01:34 EST CT/SR - CT ABDOMEN PELVIS W CONTRAST - 1/30/25 00:07 EST Findings: There is increased bibasilar atelectasis. There is mild hepatic steatosis. New faint hyperdensity layering within the gallbladder is consistent with vicarious excretion of contrast. Gallbladder is otherwise unremarkable. The pancreas, adrenal glands, and kidneys are unremarkable. There are calcified splenic granulomas. NG tube is been removed. There is slightly increased distention of small bowel in the mid and lower abdomen. There is no completely decompressed small bowel in the right lower quadrant with more abrupt transition consistent with small-bowel obstruction. There is mild mesenteric edema adjacent to dilated small bowel loops. There is mild colonic diverticulosis. The aorta is normal in diameter. IVC is widely patent. There are no enlarged lymph nodes. There is a small fat containing periumbilical hernia. There is no fracture or suspicious lytic or sclerotic lesion.     IMPRESSION: 1. Findings consistent with small-bowel obstruction with transition point in the right lower quadrant and slightly increased distention of small bowel. 2. Chronic findings as above. Authenticated and Electronically Signed by Johnnie Stevens MD on 01/31/2025 01:13:37 AM     I ordered and independently reviewed the above noted radiographic studies.  See radiologist's dictation for official interpretation.            PROCEDURES    Procedures    No orders to display       MEDICATIONS GIVEN IN ER    Medications   sodium chloride 0.9 % flush 10 mL (has no administration in time range)   sodium chloride 0.9 % flush 10 mL (has no administration in time range)   ondansetron ODT (ZOFRAN-ODT)  disintegrating tablet 4 mg (has no administration in time range)   sodium chloride 0.9 % flush 10 mL (has no administration in time range)   sodium chloride 0.9 % flush 10 mL (has no administration in time range)   sodium chloride 0.9 % infusion 40 mL (has no administration in time range)   acetaminophen (TYLENOL) tablet 650 mg (has no administration in time range)   calcium carbonate (TUMS) chewable tablet 500 mg (200 mg elemental) (has no administration in time range)   sennosides-docusate (PERICOLACE) 8.6-50 MG per tablet 2 tablet (has no administration in time range)     And   polyethylene glycol (MIRALAX) packet 17 g (has no administration in time range)     And   bisacodyl (DULCOLAX) EC tablet 5 mg (has no administration in time range)     And   bisacodyl (DULCOLAX) suppository 10 mg (has no administration in time range)   ondansetron ODT (ZOFRAN-ODT) disintegrating tablet 4 mg (has no administration in time range)     Or   ondansetron (ZOFRAN) injection 4 mg (has no administration in time range)   melatonin tablet 5 mg (5 mg Oral Not Given 1/31/25 0549)   Pharmacy to Dose enoxaparin (LOVENOX) (has no administration in time range)   Potassium Replacement - Follow Nurse / BPA Driven Protocol (has no administration in time range)   Magnesium Standard Dose Replacement - Follow Nurse / BPA Driven Protocol (has no administration in time range)   Phosphorus Replacement - Follow Nurse / BPA Driven Protocol (has no administration in time range)   Calcium Replacement - Follow Nurse / BPA Driven Protocol (has no administration in time range)   Enoxaparin Sodium (LOVENOX) syringe 40 mg (40 mg Subcutaneous Given 1/31/25 0558)   Morphine sulfate (PF) injection 2 mg (2 mg Intravenous Given 1/31/25 0557)   dextrose 5 % and sodium chloride 0.45 % 990 mL with potassium chloride 20 mEq infusion ( Intravenous New Bag 1/31/25 0557)   sodium chloride 0.9 % bolus 1,000 mL (0 mL Intravenous Stopped 1/31/25 0117)   ondansetron  (ZOFRAN) injection 8 mg (8 mg Intravenous Given 1/30/25 2308)   morphine injection 4 mg (4 mg Intravenous Given 1/30/25 2308)   iopamidol (ISOVUE-300) 61 % injection 100 mL (100 mL Intravenous Given 1/31/25 0048)   morphine injection 4 mg (4 mg Intravenous Given 1/31/25 0141)         MEDICAL DECISION MAKING, PROGRESS, and CONSULTS    All labs, if obtained, have been independently reviewed by me.  All radiology studies, if obtained, have been reviewed by me and the radiologist dictating the report.  All EKG's, if obtained, have been independently viewed and interpreted by me/my attending physician.      Discussion below represents my analysis of pertinent findings related to patient's condition, differential diagnosis, treatment plan and final disposition.                         Differential diagnosis:    Differential diagnosis for this patient includes hepatitis, cholangitis, cholecystitis, pancreatitis, gastritis, enteritis, colitis, gastroenteritis, appendicitis, volvulus, obstruction, ischemia, torsion, cystitis, pyelonephritis, nephrolithiasis, uretolithiasis, other acute emergency.    Medical Decision Making Discussion:    Vitals reviewed and are normal.    Labs reviewed which show leukocytosis.  Labs otherwise unremarkable.    CT abdomen pelvis with IV and p.o. contrast performed and per radiology is consistent with small bowel obstruction.    NG placed.    Case discussed with Dr. Tomas who accepted the patient for hospitalization.    Additional sources:    - External (non-ED) record review: History and physical from today at 0226 documenting significant past medical history of diverticulitis, heart disease and diabetes.  Presented with abdominal pain and vomiting.  Hospitalized with CT imaging concerning for gastroenteritis versus ileus versus partial small bowel obstruction.    Reviewed discharge summary from today and patient diagnosed with intractable nausea and vomiting with partial small bowel  obstruction, acute kidney injury.  NG tube had been placed.  Patient given IV fluids.  General surgery consulted.  Symptoms thought to be secondary to side effect from GLP-1 inhibitor.  Did not think patient had a bowel obstruction and NG tube discontinued.  Instructed to discontinue Mounjaro.  Patient tolerated p.o.  Discharged with instruction to follow-up outpatient with PCP within 1 week    Shared Decision Making:  After my consideration of clinical presentation and any laboratory/radiology studies obtained, I discussed the findings with the patient/patient representative who is in agreement with the treatment plan and the final disposition.   Risks and benefits of discharge and/or observation/admission were discussed.    Orders placed during this visit:  Orders Placed This Encounter   Procedures    CT Abdomen Pelvis With Contrast    XR Abdomen 1 View    [place] Draw    Comprehensive Metabolic Panel    Lipase    Urinalysis With Microscopic If Indicated (No Culture) - Urine, Clean Catch    Lactic Acid, Plasma    CBC Auto Differential    CBC (No Diff)    Comprehensive Metabolic Panel    NPO Diet NPO Type: Strict NPO, Sips with Meds, Ice Chips    Nasogastric tube insertion    Vital Signs    Intake & Output    Weigh Patient    Oral Care    Saline Lock & Maintain IV Access    Code Status and Medical Interventions: CPR (Attempt to Resuscitate); Full Support    Inpatient General Surgery Consult    Insert peripheral IV    Insert Peripheral IV    Insert Peripheral IV    Initiate Observation Status    ED Bed Request    Green Top (Gel)    Lavender Top    Gold Top - SST    Light Blue Top    CBC & Differential     AS OF 06:16 EST VITALS:    BP - 117/71  HR - 83  TEMP - 98.2 °F (36.8 °C) (Oral)  O2 SATS - 97%                  DIAGNOSIS  Final diagnoses:   Small bowel obstruction         DISPOSITION  Admit      Please note that portions of this document were completed with voice recognition software.        Edmundo Tinajero,  MD  01/31/25 0616      Electronically signed by Edmundo Tinajero MD at 01/31/25 0616       Vital Signs (last day)       Date/Time Temp Temp src Pulse Resp BP Patient Position SpO2    01/31/25 0546 -- -- -- -- -- -- 93    01/31/25 0512 -- -- -- -- 117/71 -- 97    01/30/25 2207 98.2 (36.8) Oral 83 20 137/81 Sitting 97          Current Facility-Administered Medications   Medication Dose Route Frequency Provider Last Rate Last Admin    acetaminophen (TYLENOL) tablet 650 mg  650 mg Oral Q4H PRN Abelardo Tomas DO        sennosides-docusate (PERICOLACE) 8.6-50 MG per tablet 2 tablet  2 tablet Oral BID PRN Abelardo Tomas, DO        And    polyethylene glycol (MIRALAX) packet 17 g  17 g Oral Daily PRN Abelardo Tomas, DO        And    bisacodyl (DULCOLAX) EC tablet 5 mg  5 mg Oral Daily PRN Abelardo Tomas DO        And    bisacodyl (DULCOLAX) suppository 10 mg  10 mg Rectal Daily PRN Abelardo Tomas DO        calcium carbonate (TUMS) chewable tablet 500 mg (200 mg elemental)  2 tablet Oral BID PRN Abelardo Tomas, DO        Calcium Replacement - Follow Nurse / BPA Driven Protocol   Not Applicable PRN Abelardo Tomas, DO        dextrose 5 % and sodium chloride 0.45 % 990 mL with potassium chloride 20 mEq infusion   Intravenous Continuous Abelardo Tomas  mL/hr at 01/31/25 0557 New Bag at 01/31/25 0557    Enoxaparin Sodium (LOVENOX) syringe 40 mg  40 mg Subcutaneous Q24H Abelardo Tomas, DO   40 mg at 01/31/25 0558    Magnesium Standard Dose Replacement - Follow Nurse / BPA Driven Protocol   Not Applicable PRN Abelardo Tomas DO        melatonin tablet 5 mg  5 mg Oral Nightly Abelardo Tomas DO        Morphine sulfate (PF) injection 2 mg  2 mg Intravenous Q2H PRN Abelardo Tomas, DO   2 mg at 01/31/25 0557    ondansetron ODT (ZOFRAN-ODT) disintegrating tablet 4 mg  4 mg Oral Q6H PRN Abelardo Tomas DO        Or    ondansetron (ZOFRAN) injection 4 mg  4 mg  Intravenous Q6H PRN Abelardo Tomas, DO        ondansetron ODT (ZOFRAN-ODT) disintegrating tablet 4 mg  4 mg Translingual 4x Daily PRN Abelardo Tomas,         Pharmacy to Dose enoxaparin (LOVENOX)   Not Applicable Continuous PRN Abelardo Tomas,         Phosphorus Replacement - Follow Nurse / BPA Driven Protocol   Not Applicable PRN Abelardo Tomas,         Potassium Replacement - Follow Nurse / BPA Driven Protocol   Not Applicable PRN Abelardo Tomas, DO        sodium chloride 0.9 % flush 10 mL  10 mL Intravenous PRN Abelardo Tomas, DO        sodium chloride 0.9 % flush 10 mL  10 mL Intravenous PRN Abelardo Tomas, DO        sodium chloride 0.9 % flush 10 mL  10 mL Intravenous Q12H Abelardo Tomas,         sodium chloride 0.9 % flush 10 mL  10 mL Intravenous PRN Abelardo Tomas,         sodium chloride 0.9 % infusion 40 mL  40 mL Intravenous PRN Abelardo Tomas, DO         Current Outpatient Medications   Medication Sig Dispense Refill    ondansetron ODT (ZOFRAN-ODT) 4 MG disintegrating tablet Place 1 tablet on the tongue 4 (Four) Times a Day As Needed for Nausea. 20 tablet 0     Lab Results (last 24 hours)       Procedure Component Value Units Date/Time    Lactic Acid, Plasma [[id number]]  (Normal) Collected: 01/30/25 2232    Specimen: Blood Updated: 01/30/25 2316     Lactate 1.4 mmol/L     Comprehensive Metabolic Panel [[id number]]  (Abnormal) Collected: 01/30/25 2223    Specimen: Blood Updated: 01/30/25 2304     Glucose 150 mg/dL      BUN 6 mg/dL      Creatinine 1.25 mg/dL      Sodium 142 mmol/L      Potassium 3.9 mmol/L      Chloride 102 mmol/L      CO2 26.0 mmol/L      Calcium 9.6 mg/dL      Total Protein 7.2 g/dL      Albumin 4.5 g/dL      ALT (SGPT) 36 U/L      AST (SGOT) 22 U/L      Alkaline Phosphatase 76 U/L      Total Bilirubin 0.7 mg/dL      Globulin 2.7 gm/dL      A/G Ratio 1.7 g/dL      BUN/Creatinine Ratio 4.8     Anion Gap 14.0 mmol/L      eGFR 68.0  mL/min/1.73     Narrative:      GFR Categories in Chronic Kidney Disease (CKD)      GFR Category          GFR (mL/min/1.73)    Interpretation  G1                     90 or greater         Normal or high (1)  G2                      60-89                Mild decrease (1)  G3a                   45-59                Mild to moderate decrease  G3b                   30-44                Moderate to severe decrease  G4                    15-29                Severe decrease  G5                    14 or less           Kidney failure          (1)In the absence of evidence of kidney disease, neither GFR category G1 or G2 fulfill the criteria for CKD.    eGFR calculation 2021 CKD-EPI creatinine equation, which does not include race as a factor    Lipase [[id number]]  (Normal) Collected: 01/30/25 2223    Specimen: Blood Updated: 01/30/25 2304     Lipase 17 U/L     CBC & Differential [[id number]]  (Abnormal) Collected: 01/30/25 2223    Specimen: Blood Updated: 01/30/25 2302    Narrative:      The following orders were created for panel order CBC & Differential.  Procedure                               Abnormality         Status                     ---------                               -----------         ------                     CBC Auto Differential[[id number]]        Abnormal            Final result                 Please view results for these tests on the individual orders.    CBC Auto Differential [[id number]]  (Abnormal) Collected: 01/30/25 2223    Specimen: Blood Updated: 01/30/25 2302     WBC 14.53 10*3/mm3      RBC 5.41 10*6/mm3      Hemoglobin 17.0 g/dL      Hematocrit 49.7 %      MCV 91.9 fL      MCH 31.4 pg      MCHC 34.2 g/dL      RDW 12.6 %      RDW-SD 42.5 fl      MPV 10.6 fL      Platelets 259 10*3/mm3      Neutrophil % 83.1 %      Lymphocyte % 10.0 %      Monocyte % 6.1 %      Eosinophil % 0.2 %      Basophil % 0.3 %      Immature Grans % 0.3 %      Neutrophils, Absolute 12.06 10*3/mm3      Lymphocytes,  Absolute 1.46 10*3/mm3      Monocytes, Absolute 0.89 10*3/mm3      Eosinophils, Absolute 0.03 10*3/mm3      Basophils, Absolute 0.04 10*3/mm3      Immature Grans, Absolute 0.05 10*3/mm3      nRBC 0.0 /100 WBC     [place] Draw [[id number]] Collected: 01/30/25 2223    Specimen: Blood Updated: 01/30/25 2231    Narrative:      The following orders were created for panel order [place] Draw.  Procedure                               Abnormality         Status                     ---------                               -----------         ------                     Green Top (Gel)[[id number]]                                  Final result               Lavender Top[[id number]]                                     Final result               Gold Top - SST[[id number]]                                   Final result               Light Blue Top[[id number]]                                   Final result                 Please view results for these tests on the individual orders.    Green Top (Gel) [[id number]] Collected: 01/30/25 2223    Specimen: Blood Updated: 01/30/25 2231     Extra Tube Hold for add-ons.     Comment: Auto resulted.       Lavender Top [[id number]] Collected: 01/30/25 2223    Specimen: Blood Updated: 01/30/25 2231     Extra Tube hold for add-on     Comment: Auto resulted       Gold Top - SST [[id number]] Collected: 01/30/25 2223    Specimen: Blood Updated: 01/30/25 2231     Extra Tube Hold for add-ons.     Comment: Auto resulted.       Light Blue Top [[id number]] Collected: 01/30/25 2223    Specimen: Blood Updated: 01/30/25 2231     Extra Tube Hold for add-ons.     Comment: Auto resulted             Imaging Results (Last 24 Hours)       Procedure Component Value Units Date/Time    XR Abdomen 1 View [[id number]] Collected: 01/31/25 0528     Updated: 01/31/25 0529    Narrative:      FINAL REPORT    TECHNIQUE:  null    CLINICAL HISTORY:  ng placement    COMPARISON:  null    FINDINGS:  Abdomen 1 view    Comparison:  01/30/2025.    Findings:    Supine view of portion of abdomen submitted. Pelvis not included. Nasogastric tube tip projects over left upper quadrant of abdomen in the region of the stomach. Distended small bowel increased.      Impression:      Impression:    1. NG tube tip projects over stomach.    2. Increased small bowel distention.    Authenticated and Electronically Signed by Emerson Escobedo MD on  01/31/2025 05:28:10 AM    CT Abdomen Pelvis With Contrast [[id number]] Collected: 01/31/25 0113     Updated: 01/31/25 0118    Addenda:        ADDENDUM REPORT    ADDENDUM:  This report was discussed with Edmundo Tinajero MD on Jan 31, 2025 01:15:00   EST.    Authenticated and Electronically Signed by Johnnie Stevens MD on  01/31/2025 01:16:37 AM  Signed: 01/31/25 0116 by Johnnie Stevens MD    Narrative:      FINAL REPORT    TECHNIQUE:  null    CLINICAL HISTORY:  Mid-epigastric abd pain, abd distention, possible SBO on CT from  last night    COMPARISON:  null    FINDINGS:  CT abdomen and pelvis with contrast    Comparison: CR - XR ABDOMEN 1 VW - 1/30/25 01:34 EST    CT/SR - CT ABDOMEN PELVIS W CONTRAST - 1/30/25 00:07 EST    Findings:    There is increased bibasilar atelectasis.    There is mild hepatic steatosis. New faint hyperdensity layering within the gallbladder is consistent with vicarious excretion of contrast. Gallbladder is otherwise unremarkable. The pancreas, adrenal glands, and kidneys are unremarkable. There are   calcified splenic granulomas.    NG tube is been removed. There is slightly increased distention of small bowel in the mid and lower abdomen. There is no completely decompressed small bowel in the right lower quadrant with more abrupt transition consistent with small-bowel obstruction.   There is mild mesenteric edema adjacent to dilated small bowel loops. There is mild colonic diverticulosis.    The aorta is normal in diameter. IVC is widely patent. There are no enlarged lymph nodes. There is a  small fat containing periumbilical hernia.    There is no fracture or suspicious lytic or sclerotic lesion.      Impression:      IMPRESSION:    1. Findings consistent with small-bowel obstruction with transition point in the right lower quadrant and slightly increased distention of small bowel.    2. Chronic findings as above.    Authenticated and Electronically Signed by Johnnie Stevens MD on  01/31/2025 01:13:37 AM          Physician Progress Notes (last 24 hours)  Notes from 01/30/25 0705 through 01/31/25 0705   No notes of this type exist for this encounter.       Consult Notes (last 24 hours)  Notes from 01/30/25 0705 through 01/31/25 0705   No notes of this type exist for this encounter.

## 2025-02-01 ENCOUNTER — APPOINTMENT (OUTPATIENT)
Dept: GENERAL RADIOLOGY | Facility: HOSPITAL | Age: 55
DRG: 390 | End: 2025-02-01
Payer: COMMERCIAL

## 2025-02-01 LAB
ANION GAP SERPL CALCULATED.3IONS-SCNC: 8.2 MMOL/L (ref 5–15)
BASOPHILS # BLD AUTO: 0.05 10*3/MM3 (ref 0–0.2)
BASOPHILS NFR BLD AUTO: 0.6 % (ref 0–1.5)
BUN SERPL-MCNC: 6 MG/DL (ref 6–20)
BUN/CREAT SERPL: 5.9 (ref 7–25)
CALCIUM SPEC-SCNC: 8.4 MG/DL (ref 8.6–10.5)
CHLORIDE SERPL-SCNC: 103 MMOL/L (ref 98–107)
CO2 SERPL-SCNC: 26.8 MMOL/L (ref 22–29)
CREAT SERPL-MCNC: 1.02 MG/DL (ref 0.76–1.27)
DEPRECATED RDW RBC AUTO: 42.9 FL (ref 37–54)
EGFRCR SERPLBLD CKD-EPI 2021: 86.8 ML/MIN/1.73
EOSINOPHIL # BLD AUTO: 0.25 10*3/MM3 (ref 0–0.4)
EOSINOPHIL NFR BLD AUTO: 2.8 % (ref 0.3–6.2)
ERYTHROCYTE [DISTWIDTH] IN BLOOD BY AUTOMATED COUNT: 12.4 % (ref 12.3–15.4)
GLUCOSE SERPL-MCNC: 118 MG/DL (ref 65–99)
HCT VFR BLD AUTO: 44.2 % (ref 37.5–51)
HGB BLD-MCNC: 14.8 G/DL (ref 13–17.7)
IMM GRANULOCYTES # BLD AUTO: 0.02 10*3/MM3 (ref 0–0.05)
IMM GRANULOCYTES NFR BLD AUTO: 0.2 % (ref 0–0.5)
LYMPHOCYTES # BLD AUTO: 2.03 10*3/MM3 (ref 0.7–3.1)
LYMPHOCYTES NFR BLD AUTO: 22.9 % (ref 19.6–45.3)
MCH RBC QN AUTO: 31.2 PG (ref 26.6–33)
MCHC RBC AUTO-ENTMCNC: 33.5 G/DL (ref 31.5–35.7)
MCV RBC AUTO: 93.1 FL (ref 79–97)
MONOCYTES # BLD AUTO: 0.81 10*3/MM3 (ref 0.1–0.9)
MONOCYTES NFR BLD AUTO: 9.1 % (ref 5–12)
NEUTROPHILS NFR BLD AUTO: 5.71 10*3/MM3 (ref 1.7–7)
NEUTROPHILS NFR BLD AUTO: 64.4 % (ref 42.7–76)
NRBC BLD AUTO-RTO: 0 /100 WBC (ref 0–0.2)
PLATELET # BLD AUTO: 202 10*3/MM3 (ref 140–450)
PMV BLD AUTO: 10.2 FL (ref 6–12)
POTASSIUM SERPL-SCNC: 3.9 MMOL/L (ref 3.5–5.2)
RBC # BLD AUTO: 4.75 10*6/MM3 (ref 4.14–5.8)
SODIUM SERPL-SCNC: 138 MMOL/L (ref 136–145)
WBC NRBC COR # BLD AUTO: 8.87 10*3/MM3 (ref 3.4–10.8)

## 2025-02-01 PROCEDURE — 25010000002 ENOXAPARIN PER 10 MG: Performed by: INTERNAL MEDICINE

## 2025-02-01 PROCEDURE — 25010000002 MORPHINE PER 10 MG: Performed by: INTERNAL MEDICINE

## 2025-02-01 PROCEDURE — 99231 SBSQ HOSP IP/OBS SF/LOW 25: CPT | Performed by: INTERNAL MEDICINE

## 2025-02-01 PROCEDURE — 74018 RADEX ABDOMEN 1 VIEW: CPT

## 2025-02-01 PROCEDURE — 80048 BASIC METABOLIC PNL TOTAL CA: CPT | Performed by: NURSE PRACTITIONER

## 2025-02-01 PROCEDURE — 99232 SBSQ HOSP IP/OBS MODERATE 35: CPT | Performed by: SURGERY

## 2025-02-01 PROCEDURE — 85025 COMPLETE CBC W/AUTO DIFF WBC: CPT | Performed by: NURSE PRACTITIONER

## 2025-02-01 RX ADMIN — Medication 10 ML: at 20:57

## 2025-02-01 RX ADMIN — Medication 10 ML: at 05:55

## 2025-02-01 RX ADMIN — ENOXAPARIN SODIUM 40 MG: 100 INJECTION SUBCUTANEOUS at 05:56

## 2025-02-01 RX ADMIN — Medication 10 ML: at 09:00

## 2025-02-01 RX ADMIN — Medication 10 ML: at 23:01

## 2025-02-01 RX ADMIN — MORPHINE SULFATE 2 MG: 2 INJECTION, SOLUTION INTRAMUSCULAR; INTRAVENOUS at 23:00

## 2025-02-01 RX ADMIN — MORPHINE SULFATE 2 MG: 2 INJECTION, SOLUTION INTRAMUSCULAR; INTRAVENOUS at 09:46

## 2025-02-01 RX ADMIN — MORPHINE SULFATE 2 MG: 2 INJECTION, SOLUTION INTRAMUSCULAR; INTRAVENOUS at 17:29

## 2025-02-01 NOTE — PROGRESS NOTES
[facility]  INTERNAL MEDICINE PROGRESS NOTE    Name:  Dwayne Denver Short   Age:  54 y.o.  Sex:  male  :  1970  MRN:  [id number]   Visit Number:  [id number]  Admission Date:  2025  Date Of Service:  25  Primary Care Physician:  Taniya Moise DO     LOS: 1 day :  Patient Care Team:  Taniya Moise DO as PCP - General (Family Medicine):      Subjective / Interval History:     Patient was been readmitted because of possible subacute small bowel obstruction with intractable nausea vomiting.  He is being treated conservatively and has been on NG tube with n.p.o. for the first 2 days and since today he has been started on liquids and has tolerated it well.  He still has the NG tube which has been clamped.  He denies any today after eating.  He did have a small bowel movement today      Vital Signs:    Temp:  [97.3 °F (36.3 °C)-99.2 °F (37.3 °C)] 97.3 °F (36.3 °C)  Heart Rate:  [60-72] 63  Resp:  [14-18] 18  BP: (117-139)/(68-74) 135/68    Intake and output:    I/O last 3 completed shifts:  In: 3105.9 [I.V.:2105.9; IV Piggyback:1000]  Out: 900 [Emesis/NG output:900]  No intake/output data recorded.    Physical Examination:    General Appearance:    Alert and cooperative, not in any acute distress.   Head:    Atraumatic and normocephalic, without obvious abnormality.   Eyes:            PERRLA,  No pallor. Extraocular movements are within normal limits.   Neck:   Supple,  No lymph glands, no bruit.   Lungs:     Chest shape is normal. Breath sounds heard bilaterally equally.  No crackles or wheezing.     Heart:    Normal S1 and S2, no murmur, no JVD.   Abdomen:     bowel sounds are sluggish, no masses, no organomegaly. Soft     nontender, no guarding, no rebound tenderness.  Abdominal distention noted   Extremities:   Moves all extremities well, no edema, no cyanosis,    Skin:   No  bruising or rash.   Neurologic:   Grossly nonfocal and moves all extremities.       Laboratory results:  Results from last 7 days   Lab Units 02/01/25  0547 01/31/25  0707 01/30/25  2223 01/30/25  0905   SODIUM mmol/L 138 141 142 138   POTASSIUM mmol/L 3.9 4.4 3.9 3.9   CHLORIDE mmol/L 103 105 102 104   CO2 mmol/L 26.8 28.0 26.0 24.8   BUN mg/dL 6 5* 6 8   CREATININE mg/dL 1.02 1.12 1.25 0.97   CALCIUM mg/dL 8.4* 8.5* 9.6 8.4*   BILIRUBIN mg/dL  --  0.5 0.7 0.5   ALK PHOS U/L  --  61 76 70   ALT (SGPT) U/L  --  27 36 37   AST (SGOT) U/L  --  16 22 22   GLUCOSE mg/dL 118* 137* 150* 151*     Results from last 7 days   Lab Units 02/01/25  0547 01/31/25  0707 01/30/25  2223   WBC 10*3/mm3 8.87 11.23* 14.53*   HEMOGLOBIN g/dL 14.8 14.7 17.0   HEMATOCRIT % 44.2 44.1 49.7   PLATELETS 10*3/mm3 202 208 259         Results from last 7 days   Lab Units 01/30/25  0046 01/29/25  2348   HSTROP T ng/L 7 7     Results from last 7 days   Lab Units 01/30/25  0048 01/30/25  0046   BLOODCX  No growth at 2 days No growth at 2 days       Radiology results:    Imaging Results (Last 24 Hours)       Procedure Component Value Units Date/Time    XR Abdomen KUB [[id number]] Collected: 02/01/25 0744     Updated: 02/01/25 0750    Narrative:      PROCEDURE: XR ABDOMEN KUB-     HISTORY: History of possible bowel obstruction; K56.609-Unspecified  intestinal obstruction, unspecified as to partial versus complete  obstruction     COMPARISON: January 31, 2025.     FINDINGS: An AP view of the abdomen and pelvis demonstrates NG tube  beneath the diaphragm. There are multiple prominent loops of small bowel  similar to the previous exam, correlate for ileus versus small bowel  obstruction. The colon appears fairly decompressed. There are no acute  osseous changes.           Impression:      Multiple dilated loops of small bowel worrisome for small  bowel obstruction versus ileus. Consider small bowel follow-through.                 This report was signed and finalized on 2/1/2025 7:47 AM by Bernardo Houser DO.               I have  reviewed the patient's radiology reports.    Medication Review:     I have reviewed the patient's active and prn medications.     Assessment:      SBO (small bowel obstruction)    Bowel obstruction  Partial small bowel obstruction versus ileus        Plan:    Patient is being treated conservatively at present and NGT will be still on but feeding has been started.  Will advance diet as tolerated Catherine been readmitted.  Goals of treatment plan of care has been addressed and see rest as per orders    Emmanuel Waite MD  02/01/25  15:01 EST      Please note that portions of this note were completed with a voice recognition program.

## 2025-02-01 NOTE — PLAN OF CARE
Goal Outcome Evaluation:                 No acute events during shift. VSS on room air. A&O X4.

## 2025-02-01 NOTE — SIGNIFICANT NOTE
Dr. Tomas notified patient stated his SpO2 decreases after receiving pain medication.  Dr. Tomas informed patient has received pain medication and requesting order be placed for O2 Therapy - Nasal cannula Titrate 1-6 LPM per SpO2 90-95%.  Per Dr. Tomas, order to be placed.  See orders.

## 2025-02-01 NOTE — PROGRESS NOTES
LOS: 1 day   Patient Care Team:  Taniya Moise DO as PCP - General (Family Medicine)       Chief Complaint: Abdominal pain    HPI: Patient states that overall he is feeling much better.  Still having some intermittent crampy abdominal pain.  No nausea or vomiting.  He admits to passing flatus this morning and had a small bowel movement    ROS:    Vital Signs  Temp:  [97.3 °F (36.3 °C)-99.2 °F (37.3 °C)] 97.3 °F (36.3 °C)  Heart Rate:  [60-72] 63  Resp:  [14-18] 18  BP: (117-139)/(68-74) 135/68    Physical Exam:     General Appearance:  Alert and cooperative, not in any acute distress.   Cardiovascular/Heart:  Normal S1 and S2,   GI/Abdomen:   Soft with no significant distention.  No tenderness                 Results Review:       Lab Results (last 24 hours)       Procedure Component Value Units Date/Time    Basic Metabolic Panel [[id number]]  (Abnormal) Collected: 02/01/25 0547    Specimen: Blood Updated: 02/01/25 0652     Glucose 118 mg/dL      BUN 6 mg/dL      Creatinine 1.02 mg/dL      Sodium 138 mmol/L      Potassium 3.9 mmol/L      Chloride 103 mmol/L      CO2 26.8 mmol/L      Calcium 8.4 mg/dL      BUN/Creatinine Ratio 5.9     Anion Gap 8.2 mmol/L      eGFR 86.8 mL/min/1.73     Narrative:      GFR Categories in Chronic Kidney Disease (CKD)      GFR Category          GFR (mL/min/1.73)    Interpretation  G1                     90 or greater         Normal or high (1)  G2                      60-89                Mild decrease (1)  G3a                   45-59                Mild to moderate decrease  G3b                   30-44                Moderate to severe decrease  G4                    15-29                Severe decrease  G5                    14 or less           Kidney failure          (1)In the absence of evidence of kidney disease, neither GFR category G1 or G2 fulfill the criteria for CKD.    eGFR calculation 2021 CKD-EPI creatinine equation, which does not include race as a factor     CBC & Differential [[id number]]  (Normal) Collected: 02/01/25 0547    Specimen: Blood Updated: 02/01/25 0609    Narrative:      The following orders were created for panel order CBC & Differential.  Procedure                               Abnormality         Status                     ---------                               -----------         ------                     CBC Auto Differential[[id number]]        Normal              Final result                 Please view results for these tests on the individual orders.    CBC Auto Differential [[id number]]  (Normal) Collected: 02/01/25 0547    Specimen: Blood Updated: 02/01/25 0609     WBC 8.87 10*3/mm3      RBC 4.75 10*6/mm3      Hemoglobin 14.8 g/dL      Hematocrit 44.2 %      MCV 93.1 fL      MCH 31.2 pg      MCHC 33.5 g/dL      RDW 12.4 %      RDW-SD 42.9 fl      MPV 10.2 fL      Platelets 202 10*3/mm3      Neutrophil % 64.4 %      Lymphocyte % 22.9 %      Monocyte % 9.1 %      Eosinophil % 2.8 %      Basophil % 0.6 %      Immature Grans % 0.2 %      Neutrophils, Absolute 5.71 10*3/mm3      Lymphocytes, Absolute 2.03 10*3/mm3      Monocytes, Absolute 0.81 10*3/mm3      Eosinophils, Absolute 0.25 10*3/mm3      Basophils, Absolute 0.05 10*3/mm3      Immature Grans, Absolute 0.02 10*3/mm3      nRBC 0.0 /100 WBC     Urinalysis With Microscopic If Indicated (No Culture) - Urine, Clean Catch [[id number]]  (Abnormal) Collected: 01/31/25 1336    Specimen: Urine, Clean Catch Updated: 01/31/25 1501     Color, UA Yellow     Appearance, UA Clear     pH, UA 6.0     Specific Gravity, UA 1.026     Glucose, UA Negative     Ketones, UA 15 mg/dL (1+)     Bilirubin, UA Negative     Blood, UA Negative     Protein, UA Negative     Leuk Esterase, UA Negative     Nitrite, UA Negative     Urobilinogen, UA 0.2 E.U./dL    Narrative:      Urine microscopic not indicated.        KUB was reviewed including films and I agree with the interpretation        Assessment & Plan       SBO (small bowel  obstruction)    Bowel obstruction    Patient continues to have small bowel distention on x-ray although he is beginning to have some flatus and had a small bowel movement this morning.  I am still not convinced that he has a bowel obstruction although this is in the differential.  I would like to try him on clears today and see how he progresses.  No obvious acute surgical indication at this time    Ronal Banks MD  02/01/25  12:34 EST

## 2025-02-01 NOTE — PLAN OF CARE
Goal Outcome Evaluation:           Progress: no change  Outcome Evaluation: Alert and oriented x4.  VSS on room air while awake and 2L O2 via nasal cannula while sleeping after PRN pain medication (see MAR).  Ambulated to toilet; tolerated well.  Intake and output monitored.  No acute events noted during shift.  Continue with plan of care.

## 2025-02-02 ENCOUNTER — APPOINTMENT (OUTPATIENT)
Dept: GENERAL RADIOLOGY | Facility: HOSPITAL | Age: 55
DRG: 390 | End: 2025-02-02
Payer: COMMERCIAL

## 2025-02-02 LAB
ALBUMIN SERPL-MCNC: 4.1 G/DL (ref 3.5–5.2)
ALBUMIN/GLOB SERPL: 1.8 G/DL
ALP SERPL-CCNC: 62 U/L (ref 39–117)
ALT SERPL W P-5'-P-CCNC: 24 U/L (ref 1–41)
ANION GAP SERPL CALCULATED.3IONS-SCNC: 11.3 MMOL/L (ref 5–15)
AST SERPL-CCNC: 19 U/L (ref 1–40)
BASOPHILS # BLD AUTO: 0.03 10*3/MM3 (ref 0–0.2)
BASOPHILS NFR BLD AUTO: 0.2 % (ref 0–1.5)
BILIRUB SERPL-MCNC: 0.7 MG/DL (ref 0–1.2)
BUN SERPL-MCNC: 8 MG/DL (ref 6–20)
BUN/CREAT SERPL: 8 (ref 7–25)
CALCIUM SPEC-SCNC: 8.7 MG/DL (ref 8.6–10.5)
CHLORIDE SERPL-SCNC: 103 MMOL/L (ref 98–107)
CO2 SERPL-SCNC: 25.7 MMOL/L (ref 22–29)
CREAT SERPL-MCNC: 1 MG/DL (ref 0.76–1.27)
DEPRECATED RDW RBC AUTO: 41.6 FL (ref 37–54)
EGFRCR SERPLBLD CKD-EPI 2021: 88.9 ML/MIN/1.73
EOSINOPHIL # BLD AUTO: 0.14 10*3/MM3 (ref 0–0.4)
EOSINOPHIL NFR BLD AUTO: 1.1 % (ref 0.3–6.2)
ERYTHROCYTE [DISTWIDTH] IN BLOOD BY AUTOMATED COUNT: 12.2 % (ref 12.3–15.4)
GLOBULIN UR ELPH-MCNC: 2.3 GM/DL
GLUCOSE SERPL-MCNC: 112 MG/DL (ref 65–99)
HCT VFR BLD AUTO: 44.9 % (ref 37.5–51)
HGB BLD-MCNC: 14.9 G/DL (ref 13–17.7)
IMM GRANULOCYTES # BLD AUTO: 0.03 10*3/MM3 (ref 0–0.05)
IMM GRANULOCYTES NFR BLD AUTO: 0.2 % (ref 0–0.5)
LYMPHOCYTES # BLD AUTO: 1.43 10*3/MM3 (ref 0.7–3.1)
LYMPHOCYTES NFR BLD AUTO: 11.3 % (ref 19.6–45.3)
MCH RBC QN AUTO: 30.5 PG (ref 26.6–33)
MCHC RBC AUTO-ENTMCNC: 33.2 G/DL (ref 31.5–35.7)
MCV RBC AUTO: 92 FL (ref 79–97)
MONOCYTES # BLD AUTO: 1.09 10*3/MM3 (ref 0.1–0.9)
MONOCYTES NFR BLD AUTO: 8.6 % (ref 5–12)
NEUTROPHILS NFR BLD AUTO: 78.6 % (ref 42.7–76)
NEUTROPHILS NFR BLD AUTO: 9.91 10*3/MM3 (ref 1.7–7)
NRBC BLD AUTO-RTO: 0 /100 WBC (ref 0–0.2)
PLATELET # BLD AUTO: 206 10*3/MM3 (ref 140–450)
PMV BLD AUTO: 10.1 FL (ref 6–12)
POTASSIUM SERPL-SCNC: 4 MMOL/L (ref 3.5–5.2)
PROT SERPL-MCNC: 6.4 G/DL (ref 6–8.5)
RBC # BLD AUTO: 4.88 10*6/MM3 (ref 4.14–5.8)
SODIUM SERPL-SCNC: 140 MMOL/L (ref 136–145)
WBC NRBC COR # BLD AUTO: 12.63 10*3/MM3 (ref 3.4–10.8)

## 2025-02-02 PROCEDURE — 99232 SBSQ HOSP IP/OBS MODERATE 35: CPT | Performed by: SURGERY

## 2025-02-02 PROCEDURE — 25010000002 MORPHINE PER 10 MG: Performed by: INTERNAL MEDICINE

## 2025-02-02 PROCEDURE — 99231 SBSQ HOSP IP/OBS SF/LOW 25: CPT | Performed by: INTERNAL MEDICINE

## 2025-02-02 PROCEDURE — 85025 COMPLETE CBC W/AUTO DIFF WBC: CPT | Performed by: INTERNAL MEDICINE

## 2025-02-02 PROCEDURE — 74018 RADEX ABDOMEN 1 VIEW: CPT

## 2025-02-02 PROCEDURE — 80053 COMPREHEN METABOLIC PANEL: CPT | Performed by: INTERNAL MEDICINE

## 2025-02-02 PROCEDURE — 25010000002 ENOXAPARIN PER 10 MG: Performed by: INTERNAL MEDICINE

## 2025-02-02 RX ADMIN — Medication 10 ML: at 09:11

## 2025-02-02 RX ADMIN — ENOXAPARIN SODIUM 40 MG: 100 INJECTION SUBCUTANEOUS at 06:14

## 2025-02-02 RX ADMIN — MORPHINE SULFATE 2 MG: 2 INJECTION, SOLUTION INTRAMUSCULAR; INTRAVENOUS at 22:24

## 2025-02-02 RX ADMIN — MORPHINE SULFATE 2 MG: 2 INJECTION, SOLUTION INTRAMUSCULAR; INTRAVENOUS at 13:17

## 2025-02-02 RX ADMIN — Medication 10 ML: at 22:25

## 2025-02-02 NOTE — PROGRESS NOTES
[facility]  INTERNAL MEDICINE PROGRESS NOTE    Name:  Dwayne Denver Short   Age:  54 y.o.  Sex:  male  :  1970  MRN:  [id number]   Visit Number:  [id number]  Admission Date:  2025  Date Of Service:  25  Primary Care Physician:  Taniya Moise DO     LOS: 2 days :  Patient Care Team:  Taniya Moise DO as PCP - General (Family Medicine):      Subjective / Interval History:     Patient was been readmitted because of possible subacute small bowel obstruction with intractable nausea vomiting.  He is being treated conservatively and has been on NG tube with n.p.o. for the first 2 days and since t yesterday he has been started on liquids    Patient seen on  and he has been advanced to the full liquid diet and has tolerated that well.  He did have been passing flatus and that has been a bowel movement.  He still does have cramps when he eats but no vomiting    Vital Signs:    Temp:  [97.9 °F (36.6 °C)-99.4 °F (37.4 °C)] 98.1 °F (36.7 °C)  Heart Rate:  [67-71] 70  Resp:  [18] 18  BP: (117-135)/(69-76) 117/72    Intake and output:    I/O last 3 completed shifts:  In: 2325.9 [P.O.:220; I.V.:2105.9]  Out: -   I/O this shift:  In: 980 [P.O.:980]  Out: -     Physical Examination:    General Appearance:    Alert and cooperative, not in any acute distress.   Head:    Atraumatic and normocephalic, without obvious abnormality.   Eyes:            PERRLA,  No pallor. Extraocular movements are within normal limits.   Neck:   Supple,  No lymph glands, no bruit.   Lungs:     Chest shape is normal. Breath sounds heard bilaterally equally.  No crackles or wheezing.     Heart:    Normal S1 and S2, no murmur, no JVD.   Abdomen:     bowel sounds are sluggish, no masses, no organomegaly. Soft     nontender, no guarding, no rebound tenderness.  Abdominal distention noted   Extremities:   Moves all extremities well, no edema, no cyanosis,    Skin:   No  bruising or rash.    Neurologic:   Grossly nonfocal and moves all extremities.      Laboratory results:  Results from last 7 days   Lab Units 02/02/25  0556 02/01/25  0547 01/31/25  0707 01/30/25  2223   SODIUM mmol/L 140 138 141 142   POTASSIUM mmol/L 4.0 3.9 4.4 3.9   CHLORIDE mmol/L 103 103 105 102   CO2 mmol/L 25.7 26.8 28.0 26.0   BUN mg/dL 8 6 5* 6   CREATININE mg/dL 1.00 1.02 1.12 1.25   CALCIUM mg/dL 8.7 8.4* 8.5* 9.6   BILIRUBIN mg/dL 0.7  --  0.5 0.7   ALK PHOS U/L 62  --  61 76   ALT (SGPT) U/L 24  --  27 36   AST (SGOT) U/L 19  --  16 22   GLUCOSE mg/dL 112* 118* 137* 150*     Results from last 7 days   Lab Units 02/02/25  0556 02/01/25  0547 01/31/25  0707   WBC 10*3/mm3 12.63* 8.87 11.23*   HEMOGLOBIN g/dL 14.9 14.8 14.7   HEMATOCRIT % 44.9 44.2 44.1   PLATELETS 10*3/mm3 206 202 208         Results from last 7 days   Lab Units 01/30/25  0046 01/29/25  2348   HSTROP T ng/L 7 7     Results from last 7 days   Lab Units 01/30/25  0048 01/30/25  0046   BLOODCX  No growth at 3 days No growth at 3 days       Radiology results:    Imaging Results (Last 24 Hours)       Procedure Component Value Units Date/Time    XR Abdomen KUB [[id number]] Collected: 02/02/25 0851     Updated: 02/02/25 0855    Narrative:      PROCEDURE: XR ABDOMEN KUB-     HISTORY: Bowel obstruction; K56.609-Unspecified intestinal obstruction,  unspecified as to partial versus complete obstruction     COMPARISON: February 1, 2025.     FINDINGS: An AP view of the abdomen and pelvis demonstrates NG tube  beneath the diaphragm in the stomach. Again are seen multiple dilated  loops of small bowel, not significantly changed from prior, correlate  for obstruction versus ileus. No acute osseous changes. No radiopaque  stones are seen overlying the renal shadows.       Impression:      No significant change from prior. Continued follow-up  recommended, consider small bowel follow-through.                 This report was signed and finalized on 2/2/2025 8:53 AM by Bernardo  DO Mik.               I have reviewed the patient's radiology reports.    Medication Review:     I have reviewed the patient's active and prn medications.     Assessment:      SBO (small bowel obstruction)    Bowel obstruction  Partial small bowel obstruction versus ileus        Plan:    Patient is being treated conservatively at present his NG tube has been removed today he will be advanced on the diet to full liquid and if he tolerates it well most likely could be discharged tomorrow  Goals of treatment plan of care has been addressed and see rest as per orders    Emmanuel Waite MD  02/02/25  13:44 EST      Please note that portions of this note were completed with a voice recognition program.

## 2025-02-02 NOTE — PLAN OF CARE
Problem: Adult Inpatient Plan of Care  Goal: Plan of Care Review  Outcome: Progressing  Goal: Patient-Specific Goal (Individualized)  Outcome: Progressing  Goal: Absence of Hospital-Acquired Illness or Injury  Outcome: Progressing  Intervention: Identify and Manage Fall Risk  Recent Flowsheet Documentation  Taken 2/2/2025 1600 by Logan oWodard RN  Safety Promotion/Fall Prevention:   safety round/check completed   nonskid shoes/slippers when out of bed   assistive device/personal items within reach  Taken 2/2/2025 1400 by Logan Woodard RN  Safety Promotion/Fall Prevention:   safety round/check completed   assistive device/personal items within reach   nonskid shoes/slippers when out of bed  Taken 2/2/2025 1200 by Logan Woodard RN  Safety Promotion/Fall Prevention:   safety round/check completed   nonskid shoes/slippers when out of bed   assistive device/personal items within reach  Taken 2/2/2025 1025 by Logan Woodard RN  Safety Promotion/Fall Prevention:   safety round/check completed   assistive device/personal items within reach   nonskid shoes/slippers when out of bed  Taken 2/2/2025 0800 by Logan Woodard RN  Safety Promotion/Fall Prevention: safety round/check completed  Intervention: Prevent Skin Injury  Recent Flowsheet Documentation  Taken 2/2/2025 1600 by Logan Woodard RN  Body Position: position changed independently  Taken 2/2/2025 1400 by Logan Woodard RN  Body Position: position changed independently  Taken 2/2/2025 1200 by Logan Woodard RN  Body Position: position changed independently  Taken 2/2/2025 1025 by Logan Woodard RN  Body Position: position changed independently  Taken 2/2/2025 0800 by Logan Woodard RN  Body Position: position changed independently  Skin Protection: pulse oximeter probe site changed  Goal: Optimal Comfort and Wellbeing  Outcome: Progressing  Intervention: Monitor Pain and Promote Comfort  Recent Flowsheet Documentation  Taken 2/2/2025 1317 by Logan Woodard  Deepa and Edson are here today for a genetic counceling appointment and comprehensive ultrasound today at [facility]. They do not want to know sex of baby today. Medications and primary OB clinic reviewed. Denies questions or concerns before ultrasound. Education performed on today's ultrasound. SBAR given to [facility] MD, see their note in Epic.   RN  Pain Management Interventions: pain medication given  Intervention: Provide Person-Centered Care  Recent Flowsheet Documentation  Taken 2/2/2025 0800 by Logan Woodard RN  Trust Relationship/Rapport:   care explained   choices provided   emotional support provided   empathic listening provided   questions answered   questions encouraged   reassurance provided   thoughts/feelings acknowledged  Goal: Readiness for Transition of Care  Outcome: Progressing   Goal Outcome Evaluation:

## 2025-02-02 NOTE — PLAN OF CARE
Goal Outcome Evaluation:           Progress: improving  Outcome Evaluation: VSS on room air while awake and 2L O2 via nasal cannula while sleeping after receiving IV pain medication.  Ordered PRN medication administered (see MAR) for complaint of pain.  Oriented x4.  Ambulated in severino; tolerated well.  No acute events noted during shift.  Continue with plan of care.

## 2025-02-02 NOTE — PROGRESS NOTES
LOS: 2 days   Patient Care Team:  Taniya Moise DO as PCP - General (Family Medicine)       Chief Complaint: Abdominal pain    HPI: Patient currently without abdominal pain.  States that he is passing more flatus.  No nausea or vomiting    ROS:    Vital Signs  Temp:  [97.3 °F (36.3 °C)-99.4 °F (37.4 °C)] 98.1 °F (36.7 °C)  Heart Rate:  [63-71] 70  Resp:  [18] 18  BP: (123-135)/(68-76) 124/70    Physical Exam:     General Appearance:  Alert and cooperative, not in any acute distress.   Cardiovascular/Heart:  Normal S1 and S2,    GI/Abdomen:   Soft and nondistended.  No tenderness             Results Review:       Lab Results (last 24 hours)       Procedure Component Value Units Date/Time    Comprehensive Metabolic Panel [[id number]]  (Abnormal) Collected: 02/02/25 0556    Specimen: Blood Updated: 02/02/25 0641     Glucose 112 mg/dL      BUN 8 mg/dL      Creatinine 1.00 mg/dL      Sodium 140 mmol/L      Potassium 4.0 mmol/L      Chloride 103 mmol/L      CO2 25.7 mmol/L      Calcium 8.7 mg/dL      Total Protein 6.4 g/dL      Albumin 4.1 g/dL      ALT (SGPT) 24 U/L      AST (SGOT) 19 U/L      Alkaline Phosphatase 62 U/L      Total Bilirubin 0.7 mg/dL      Globulin 2.3 gm/dL      A/G Ratio 1.8 g/dL      BUN/Creatinine Ratio 8.0     Anion Gap 11.3 mmol/L      eGFR 88.9 mL/min/1.73     Narrative:      GFR Categories in Chronic Kidney Disease (CKD)      GFR Category          GFR (mL/min/1.73)    Interpretation  G1                     90 or greater         Normal or high (1)  G2                      60-89                Mild decrease (1)  G3a                   45-59                Mild to moderate decrease  G3b                   30-44                Moderate to severe decrease  G4                    15-29                Severe decrease  G5                    14 or less           Kidney failure          (1)In the absence of evidence of kidney disease, neither GFR category G1 or G2 fulfill the criteria for  CKD.    eGFR calculation 2021 CKD-EPI creatinine equation, which does not include race as a factor    CBC & Differential [[id number]]  (Abnormal) Collected: 02/02/25 0556    Specimen: Blood Updated: 02/02/25 0620    Narrative:      The following orders were created for panel order CBC & Differential.  Procedure                               Abnormality         Status                     ---------                               -----------         ------                     CBC Auto Differential[[id number]]        Abnormal            Final result                 Please view results for these tests on the individual orders.    CBC Auto Differential [[id number]]  (Abnormal) Collected: 02/02/25 0556    Specimen: Blood Updated: 02/02/25 0620     WBC 12.63 10*3/mm3      RBC 4.88 10*6/mm3      Hemoglobin 14.9 g/dL      Hematocrit 44.9 %      MCV 92.0 fL      MCH 30.5 pg      MCHC 33.2 g/dL      RDW 12.2 %      RDW-SD 41.6 fl      MPV 10.1 fL      Platelets 206 10*3/mm3      Neutrophil % 78.6 %      Lymphocyte % 11.3 %      Monocyte % 8.6 %      Eosinophil % 1.1 %      Basophil % 0.2 %      Immature Grans % 0.2 %      Neutrophils, Absolute 9.91 10*3/mm3      Lymphocytes, Absolute 1.43 10*3/mm3      Monocytes, Absolute 1.09 10*3/mm3      Eosinophils, Absolute 0.14 10*3/mm3      Basophils, Absolute 0.03 10*3/mm3      Immature Grans, Absolute 0.03 10*3/mm3      nRBC 0.0 /100 WBC                 Assessment & Plan       SBO (small bowel obstruction)    Bowel obstruction    Patient with evidence of bowel obstruction on CT scan.  Patient with no prior surgeries and recently began a GLP.  I suspect it is related.  His bowel function is returning and has a reassuring exam.  KUB with increased gas within the colon and has been tolerating liquids without issue.  I will remove the NG and advance to full liquid diet.  No acute indication at this time    Ronal Banks MD  02/02/25  10:31 EST

## 2025-02-03 ENCOUNTER — READMISSION MANAGEMENT (OUTPATIENT)
Dept: CALL CENTER | Facility: HOSPITAL | Age: 55
End: 2025-02-03
Payer: COMMERCIAL

## 2025-02-03 VITALS
OXYGEN SATURATION: 93 % | HEIGHT: 73 IN | SYSTOLIC BLOOD PRESSURE: 118 MMHG | TEMPERATURE: 98.5 F | HEART RATE: 67 BPM | RESPIRATION RATE: 16 BRPM | BODY MASS INDEX: 34.68 KG/M2 | WEIGHT: 261.69 LBS | DIASTOLIC BLOOD PRESSURE: 64 MMHG

## 2025-02-03 LAB
ALBUMIN SERPL-MCNC: 3.8 G/DL (ref 3.5–5.2)
ALBUMIN/GLOB SERPL: 1.6 G/DL
ALP SERPL-CCNC: 65 U/L (ref 39–117)
ALT SERPL W P-5'-P-CCNC: 27 U/L (ref 1–41)
ANION GAP SERPL CALCULATED.3IONS-SCNC: 10.4 MMOL/L (ref 5–15)
AST SERPL-CCNC: 25 U/L (ref 1–40)
BASOPHILS # BLD AUTO: 0.02 10*3/MM3 (ref 0–0.2)
BASOPHILS NFR BLD AUTO: 0.2 % (ref 0–1.5)
BILIRUB SERPL-MCNC: 0.6 MG/DL (ref 0–1.2)
BUN SERPL-MCNC: 8 MG/DL (ref 6–20)
BUN/CREAT SERPL: 8.2 (ref 7–25)
CALCIUM SPEC-SCNC: 8.6 MG/DL (ref 8.6–10.5)
CHLORIDE SERPL-SCNC: 103 MMOL/L (ref 98–107)
CO2 SERPL-SCNC: 26.6 MMOL/L (ref 22–29)
CREAT SERPL-MCNC: 0.98 MG/DL (ref 0.76–1.27)
DEPRECATED RDW RBC AUTO: 40.8 FL (ref 37–54)
EGFRCR SERPLBLD CKD-EPI 2021: 91.1 ML/MIN/1.73
EOSINOPHIL # BLD AUTO: 0.22 10*3/MM3 (ref 0–0.4)
EOSINOPHIL NFR BLD AUTO: 2.3 % (ref 0.3–6.2)
ERYTHROCYTE [DISTWIDTH] IN BLOOD BY AUTOMATED COUNT: 12.3 % (ref 12.3–15.4)
GLOBULIN UR ELPH-MCNC: 2.4 GM/DL
GLUCOSE SERPL-MCNC: 97 MG/DL (ref 65–99)
HCT VFR BLD AUTO: 43.2 % (ref 37.5–51)
HGB BLD-MCNC: 14.8 G/DL (ref 13–17.7)
IMM GRANULOCYTES # BLD AUTO: 0.02 10*3/MM3 (ref 0–0.05)
IMM GRANULOCYTES NFR BLD AUTO: 0.2 % (ref 0–0.5)
LYMPHOCYTES # BLD AUTO: 1.43 10*3/MM3 (ref 0.7–3.1)
LYMPHOCYTES NFR BLD AUTO: 15.1 % (ref 19.6–45.3)
MCH RBC QN AUTO: 31.2 PG (ref 26.6–33)
MCHC RBC AUTO-ENTMCNC: 34.3 G/DL (ref 31.5–35.7)
MCV RBC AUTO: 90.9 FL (ref 79–97)
MONOCYTES # BLD AUTO: 0.86 10*3/MM3 (ref 0.1–0.9)
MONOCYTES NFR BLD AUTO: 9.1 % (ref 5–12)
NEUTROPHILS NFR BLD AUTO: 6.91 10*3/MM3 (ref 1.7–7)
NEUTROPHILS NFR BLD AUTO: 73.1 % (ref 42.7–76)
NRBC BLD AUTO-RTO: 0 /100 WBC (ref 0–0.2)
PLATELET # BLD AUTO: 201 10*3/MM3 (ref 140–450)
PMV BLD AUTO: 10.6 FL (ref 6–12)
POTASSIUM SERPL-SCNC: 3.8 MMOL/L (ref 3.5–5.2)
PROT SERPL-MCNC: 6.2 G/DL (ref 6–8.5)
RBC # BLD AUTO: 4.75 10*6/MM3 (ref 4.14–5.8)
SODIUM SERPL-SCNC: 140 MMOL/L (ref 136–145)
WBC NRBC COR # BLD AUTO: 9.46 10*3/MM3 (ref 3.4–10.8)

## 2025-02-03 PROCEDURE — 80053 COMPREHEN METABOLIC PANEL: CPT | Performed by: INTERNAL MEDICINE

## 2025-02-03 PROCEDURE — 85025 COMPLETE CBC W/AUTO DIFF WBC: CPT | Performed by: INTERNAL MEDICINE

## 2025-02-03 PROCEDURE — 99232 SBSQ HOSP IP/OBS MODERATE 35: CPT | Performed by: SURGERY

## 2025-02-03 RX ADMIN — Medication 10 ML: at 08:38

## 2025-02-03 NOTE — PROGRESS NOTES
Patient: Dwayne Denver Short    YOB: 1970    Date: 02/05/2025    Primary Care Provider: Taniya Moise DO    Chief Complaint   Patient presents with    Hospital Follow Up Visit     Small bowel obstruction        SUBJECTIVE:    History of present illness:  Patient is here for follow-up recent [facility] stay secondary to small bowel obstruction.  He still has diarrhea, he is no longer taking his GLP inhibitor.    The following portions of the patient's history were reviewed and updated as appropriate: allergies, current medications, past family history, past medical history, past social history, past surgical history and problem list.      Review of Systems   Constitutional:  Negative for chills, fever and unexpected weight change.   HENT:  Negative for trouble swallowing and voice change.    Eyes:  Negative for visual disturbance.   Respiratory:  Negative for apnea, cough, chest tightness, shortness of breath and wheezing.    Cardiovascular:  Negative for chest pain, palpitations and leg swelling.   Gastrointestinal:  Negative for abdominal distention, abdominal pain, anal bleeding, blood in stool, constipation, diarrhea, nausea, rectal pain and vomiting.   Endocrine: Negative for cold intolerance and heat intolerance.   Genitourinary:  Negative for difficulty urinating, dysuria, flank pain, scrotal swelling and testicular pain.   Musculoskeletal:  Negative for back pain, gait problem and joint swelling.   Skin:  Negative for color change, rash and wound.   Neurological:  Negative for dizziness, syncope, speech difficulty, weakness, numbness and headaches.   Hematological:  Negative for adenopathy. Does not bruise/bleed easily.   Psychiatric/Behavioral:  Negative for confusion. The patient is not nervous/anxious.        Allergies:  Allergies   Allergen Reactions    Codeine Other (See Comments)     headache       Medications:    Current Outpatient Medications:      "eszopiclone (LUNESTA) 3 MG tablet, Take 1 tablet by mouth every night at bedtime., Disp: , Rfl:     gabapentin (NEURONTIN) 100 MG capsule, Take 3 capsules by mouth 2 (Two) Times a Day., Disp: , Rfl:     LORazepam (ATIVAN) 0.5 MG tablet, TAKE 1 TABLET BY MOUTH ONCE DAILY AS NEEDED FOR 30 DAYS, Disp: , Rfl:     probiotic (CULTURELLE) capsule capsule, Daily., Disp: , Rfl:     diclofenac (VOLTAREN) 75 MG EC tablet, Take 1 tablet twice a day by oral route for 30 days. (Patient not taking: Reported on 2/5/2025), Disp: , Rfl:     lidocaine (LIDODERM) 5 %, Apply by topical route for 90 days. (Patient not taking: Reported on 2/5/2025), Disp: , Rfl:     meclizine (ANTIVERT) 25 MG tablet, Take 1 tablet by mouth 3 (Three) Times a Day. (Patient not taking: Reported on 2/5/2025), Disp: , Rfl:     Mounjaro 2.5 MG/0.5ML solution auto-injector, Inject by subcutaneous route for 84 days. (Patient not taking: Reported on 2/5/2025), Disp: , Rfl:     ondansetron ODT (ZOFRAN-ODT) 4 MG disintegrating tablet, Place 1 tablet on the tongue 4 (Four) Times a Day As Needed for Nausea. (Patient not taking: Reported on 2/5/2025), Disp: 20 tablet, Rfl: 0    Polyethylene Glycol 3350 powder, polyethylene glycol 3350 17 gram/dose oral powder  MIX 238GM WITH 64OZ OF CLEAR LIQUID AS DIRECTED (Patient not taking: Reported on 2/5/2025), Disp: , Rfl:     History:  Past Medical History:   Diagnosis Date    Diverticulitis     Myocardial infarction        History reviewed. No pertinent surgical history.    History reviewed. No pertinent family history.    Social History     Tobacco Use    Smoking status: Never    Smokeless tobacco: Never   Vaping Use    Vaping status: Never Used   Substance Use Topics    Alcohol use: Yes     Comment: occasionally    Drug use: No        OBJECTIVE:    Vital Signs:   Vitals:    02/05/25 1458   BP: 110/68   Pulse: (!) 132   Temp: 98.2 °F (36.8 °C)   SpO2: 97%   Weight: 118 kg (260 lb)   Height: 185.4 cm (72.99\")       Physical " Exam:   General Appearance:    Alert, cooperative, in no acute distress   Head:    Normocephalic, without obvious abnormality, atraumatic   Eyes:            Lids and lashes normal, conjunctivae and sclerae normal, no   icterus, no pallor, corneas clear, PERRLA   Ears:    Ears appear intact with no abnormalities noted   Throat:   No oral lesions, no thrush, oral mucosa moist   Neck:   No adenopathy, supple, trachea midline, no thyromegaly, no   carotid bruit, no JVD   Lungs:     Clear to auscultation,respirations regular, even and                  unlabored    Heart:    Regular rhythm and normal rate, normal S1 and S2, no            murmur, no gallop, no rub, no click   Chest Wall:    No abnormalities observed   Abdomen:     Normal bowel sounds, no masses, no organomegaly, soft        non-tender, non-distended, no guarding, no rebound                tenderness   Extremities:   Moves all extremities well, no edema, no cyanosis, no             redness   Pulses:   Pulses palpable and equal bilaterally   Skin:   No bleeding, bruising or rash   Lymph nodes:   No palpable adenopathy   Neurologic:   Cranial nerves 2 - 12 grossly intact, sensation intact, DTR       present and equal bilaterally       Results Review:   I reviewed the patient's new clinical results.  I reviewed the patient's new imaging results and agree with the interpretation.  I reviewed the patient's other test results and agree with the interpretation    Review of Systems was reviewed and confirmed as accurate as documented by the MA.    ASSESSMENT/PLAN:    1. SBO (small bowel obstruction)        In short, I did have a detailed and extensive discussion with the patient in the office today and I would like to pursue a conservative treatment course for the next 2 weeks and just allow the patient to recover a bit from his recent GLP inhibitor which I think may have been the cause of his severe ileus.  He may need future upper and lower endoscopy and possible  small bowel series, I do not want to perform these at this time since he is just now starting to get better.  I want to see him back in the office in 2 weeks, he clearly knows to see me sooner if he has any further problems.    I discussed the patients findings and my recommendations with patient        Electronically signed by David Vega MD  02/06/25

## 2025-02-03 NOTE — DISCHARGE SUMMARY
[facility] HOSPITALIST   DISCHARGE SUMMARY      Name:  Dwayne Denver Short   Age:  54 y.o.  Sex:  male  :  1970  MRN:  [id number]   Visit Number:  [id number]    Admission Date:  2025  Date of Discharge:  2/3/2025  Primary Care Physician:  Taniya Moise DO      Discharge Diagnoses:     Small bowel obstruction    Problem List:     Active Hospital Problems    Diagnosis  POA    **SBO (small bowel obstruction) [K56.609]  Yes    Bowel obstruction [K56.609]  Yes      Resolved Hospital Problems   No resolved problems to display.     Presenting Problem:    Chief Complaint   Patient presents with    Abdominal Pain      Consults:     Consulting Physician(s)         Provider   Role Specialty     Emmanuel Waite MD      Consulting Physician Internal Medicine          Procedures Performed:        History of presenting illness/Hospital Course:    Patient is a 54-year-old male admitted to the hospitalist service due to intractable nausea and vomiting suspect secondary to subacute small bowel obstruction.  Dr. Vega general surgery consulted on admission.  Patient had NG tube placed upon admission for decompression.  SBO managed conservatively.  Patient had significant improvement, NG tube removed.  Diet advanced slowly and tolerated well.  Patient able to pass flatus and had a bowel movement prior to discharge.  Outpatient follow-up recommended.  Strict return instructions given.    Vital Signs:    Temp:  [98.1 °F (36.7 °C)-99.3 °F (37.4 °C)] 98.5 °F (36.9 °C)  Heart Rate:  [63-67] 67  Resp:  [16-18] 16  BP: (108-118)/(60-64) 118/64    Physical Exam:    General Appearance:  Alert and cooperative.    Head:  Atraumatic and normocephalic.   Eyes: Conjunctivae and sclerae normal, no icterus. No pallor.   Ears:  Ears with no abnormalities noted.   Throat: No oral lesions, no thrush, oral mucosa moist.   Neck: Supple, trachea midline, no thyromegaly.   Back:   No kyphoscoliosis present. No  tenderness to palpation.   Lungs:   Breath sounds heard bilaterally equally.  No crackles or wheezing. No Pleural rub or bronchial breathing.   Heart:  Normal S1 and S2, no murmur, no gallop, no rub. No JVD.   Abdomen:   Normal bowel sounds, no masses, no organomegaly. Soft, nontender, nondistended, no rebound tenderness.   Extremities: Supple, no edema, no cyanosis, no clubbing.   Pulses: Pulses palpable bilaterally.   Skin: No bleeding or rash.   Neurologic: Alert and oriented x 3. No facial asymmetry. Moves all four limbs. No tremors.     Pertinent Lab Results:     Results from last 7 days   Lab Units 02/03/25  0508 02/02/25  0556 02/01/25  0547 01/31/25  0707   SODIUM mmol/L 140 140 138 141   POTASSIUM mmol/L 3.8 4.0 3.9 4.4   CHLORIDE mmol/L 103 103 103 105   CO2 mmol/L 26.6 25.7 26.8 28.0   BUN mg/dL 8 8 6 5*   CREATININE mg/dL 0.98 1.00 1.02 1.12   CALCIUM mg/dL 8.6 8.7 8.4* 8.5*   BILIRUBIN mg/dL 0.6 0.7  --  0.5   ALK PHOS U/L 65 62  --  61   ALT (SGPT) U/L 27 24  --  27   AST (SGOT) U/L 25 19  --  16   GLUCOSE mg/dL 97 112* 118* 137*     Results from last 7 days   Lab Units 02/03/25  0508 02/02/25  0556 02/01/25  0547   WBC 10*3/mm3 9.46 12.63* 8.87   HEMOGLOBIN g/dL 14.8 14.9 14.8   HEMATOCRIT % 43.2 44.9 44.2   PLATELETS 10*3/mm3 201 206 202         Results from last 7 days   Lab Units 01/30/25  0046 01/29/25  2348   HSTROP T ng/L 7 7             Results from last 7 days   Lab Units 01/30/25  2223   LIPASE U/L 17         Results from last 7 days   Lab Units 01/30/25  0048 01/30/25  0046   BLOODCX  No growth at 4 days No growth at 4 days       Pertinent Radiology Results:    Imaging Results (All)       Procedure Component Value Units Date/Time    XR Abdomen KUB [[id number]] Collected: 02/02/25 0851     Updated: 02/02/25 0855    Narrative:      PROCEDURE: XR ABDOMEN KUB-     HISTORY: Bowel obstruction; K56.609-Unspecified intestinal obstruction,  unspecified as to partial versus complete obstruction      COMPARISON: February 1, 2025.     FINDINGS: An AP view of the abdomen and pelvis demonstrates NG tube  beneath the diaphragm in the stomach. Again are seen multiple dilated  loops of small bowel, not significantly changed from prior, correlate  for obstruction versus ileus. No acute osseous changes. No radiopaque  stones are seen overlying the renal shadows.       Impression:      No significant change from prior. Continued follow-up  recommended, consider small bowel follow-through.                 This report was signed and finalized on 2/2/2025 8:53 AM by Bernardo Houser DO.       XR Abdomen KUB [[id number]] Collected: 02/01/25 0744     Updated: 02/01/25 0750    Narrative:      PROCEDURE: XR ABDOMEN KUB-     HISTORY: History of possible bowel obstruction; K56.609-Unspecified  intestinal obstruction, unspecified as to partial versus complete  obstruction     COMPARISON: January 31, 2025.     FINDINGS: An AP view of the abdomen and pelvis demonstrates NG tube  beneath the diaphragm. There are multiple prominent loops of small bowel  similar to the previous exam, correlate for ileus versus small bowel  obstruction. The colon appears fairly decompressed. There are no acute  osseous changes.           Impression:      Multiple dilated loops of small bowel worrisome for small  bowel obstruction versus ileus. Consider small bowel follow-through.                 This report was signed and finalized on 2/1/2025 7:47 AM by Bernardo Houser DO.       CT Abdomen Pelvis Without Contrast [[id number]] Collected: 01/31/25 0909     Updated: 01/31/25 0921    Narrative:      PROCEDURE: CT ABDOMEN PELVIS WO CONTRAST-     HISTORY: sbo; K56.609-Unspecified intestinal obstruction, unspecified as  to partial versus complete obstruction     COMPARISON: 8 hours prior.     PROCEDURE: Axial images were obtained from the lung bases to the pubic  symphysis by computed tomography. This study was performed with  techniques to keep radiation doses as  low as reasonably achievable,  (ALARA). Individualized dose reduction techniques using automated  exposure control or adjustment of mA and/or kV according to the patient  size were employed.     FINDINGS:     ABDOMEN: Since the prior exam NG tube has been placed with the tip in  the body of the stomach. Oral contrast was administered. There is a  small amount remaining in the stomach. Oral contrast has progressed to  mildly dilated jejunal loops in the left side of the abdomen. Which are  stable to slightly improved compared to prior. Remainder of the small  bowel and colon appear decompressed. Bilateral linear lower lobe  densities noted right greater than left, likely atelectasis. Gallbladder  present and contains dependent material of increased attenuation new  from prior, likely vicarious excretion of the previously administered  intravenous contrast. The heart is proper size. The limited non-contrast  images of the liver are unremarkable. The spleen is unremarkable. No  adrenal masses are seen. The aorta is normal in caliber. There is no  significant free fluid or adenopathy.  There is no nephrolithiasis.  There is no hydronephrosis. Small amount of contrast is identified in  the renal collecting systems bilaterally.     PELVIS: The GI tract demonstrate no obstruction. The appendix is  identified and appears normal. The urinary bladder is partially filled  and contains contrast likely from prior study. This is not identified on  the prior exam. Prostate is normal in size and contains calcifications,  stable from prior. There is diverticulosis without evidence of  diverticulitis.. No free air identified. There is no fluid or  adenopathy.        Impression:      Interval placement of NG tube and administration of oral  contrast with progression of contrast into the jejunum in the left side  of the abdomen; mildly dilated jejunal loops noted with the remainder of  the bowel decompressed in this patient who  received a recent injection  of Mounjaro, suspect decreased motility secondary to medication.  Follow-up may be helpful.     Mild dilatation of the jejunum, similar to slightly improved compared to  prior.        CTDI: 12.85 mGy  DLP:737.63 mGy.cm     This report was signed and finalized on 1/31/2025 9:19 AM by Lazara Rodriguez MD.       XR Abdomen 1 View [[id number]] Collected: 01/31/25 0528     Updated: 01/31/25 0529    Narrative:      FINAL REPORT    TECHNIQUE:  null    CLINICAL HISTORY:  ng placement    COMPARISON:  null    FINDINGS:  Abdomen 1 view    Comparison: 01/30/2025.    Findings:    Supine view of portion of abdomen submitted. Pelvis not included. Nasogastric tube tip projects over left upper quadrant of abdomen in the region of the stomach. Distended small bowel increased.      Impression:      Impression:    1. NG tube tip projects over stomach.    2. Increased small bowel distention.    Authenticated and Electronically Signed by Emerson Escobedo MD on  01/31/2025 05:28:10 AM    CT Abdomen Pelvis With Contrast [[id number]] Collected: 01/31/25 0113     Updated: 01/31/25 0118    Addenda:        ADDENDUM REPORT    ADDENDUM:  This report was discussed with Edmundo Tinajero MD on Jan 31, 2025 01:15:00   EST.    Authenticated and Electronically Signed by Johnnie Stevens MD on  01/31/2025 01:16:37 AM  Signed: 01/31/25 0116 by Johnnie Stevens MD    Narrative:      FINAL REPORT    TECHNIQUE:  null    CLINICAL HISTORY:  Mid-epigastric abd pain, abd distention, possible SBO on CT from  last night    COMPARISON:  null    FINDINGS:  CT abdomen and pelvis with contrast    Comparison: CR - XR ABDOMEN 1 VW - 1/30/25 01:34 EST    CT/SR - CT ABDOMEN PELVIS W CONTRAST - 1/30/25 00:07 EST    Findings:    There is increased bibasilar atelectasis.    There is mild hepatic steatosis. New faint hyperdensity layering within the gallbladder is consistent with vicarious excretion of contrast. Gallbladder is otherwise unremarkable. The  pancreas, adrenal glands, and kidneys are unremarkable. There are   calcified splenic granulomas.    NG tube is been removed. There is slightly increased distention of small bowel in the mid and lower abdomen. There is no completely decompressed small bowel in the right lower quadrant with more abrupt transition consistent with small-bowel obstruction.   There is mild mesenteric edema adjacent to dilated small bowel loops. There is mild colonic diverticulosis.    The aorta is normal in diameter. IVC is widely patent. There are no enlarged lymph nodes. There is a small fat containing periumbilical hernia.    There is no fracture or suspicious lytic or sclerotic lesion.      Impression:      IMPRESSION:    1. Findings consistent with small-bowel obstruction with transition point in the right lower quadrant and slightly increased distention of small bowel.    2. Chronic findings as above.    Authenticated and Electronically Signed by Johnnie Stevens MD on  01/31/2025 01:13:37 AM            Echo:      Condition on Discharge:      Stable.    Code status during the hospital stay:    Code Status and Medical Interventions: CPR (Attempt to Resuscitate); Full Support   Ordered at: 01/31/25 0520     Code Status (Patient has no pulse and is not breathing):    CPR (Attempt to Resuscitate)     Medical Interventions (Patient has pulse or is breathing):    Full Support     Discharge Disposition:    Home or Self Care    Discharge Medications:       Discharge Medications        Continue These Medications        Instructions Start Date   ondansetron ODT 4 MG disintegrating tablet  Commonly known as: ZOFRAN-ODT   4 mg, Translingual, 4 Times Daily PRN             Discharge Diet:     Diet Instructions       Diet: Liquid Diets; Full Liquid; Regular (IDDSI 7); Thin (IDDSI 0)      Discharge Diet: Liquid Diets    Liquid Diet: Full Liquid    Texture: Regular (IDDSI 7)    Fluid Consistency: Thin (IDDSI 0)          Activity at Discharge:      Activity Instructions       Activity as Tolerated            Follow-up Appointments:    Additional Instructions for the Follow-ups that You Need to Schedule       Discharge Follow-up with PCP   As directed       Currently Documented PCP:    Taniya Moise DO    PCP Phone Number:    [phone]     Follow Up Details: 1 week        Discharge Follow-up with Specified Provider: Dr Vega -  02/05/25; 2 Days   As directed      To: Dr Vega -  02/05/25   Follow Up: 2 Days               Follow-up Information       Taniya Moise DO. Go on 2/7/2025.    Specialty: Family Medicine  Why: @ 10:00am  Contact information:  [address] [facility] [address]  [phone]               Taniya Moise DO .    Specialty: Family Medicine  Contact information:  [address] [facility] [address]  [phone]                           Future Appointments   Date Time Provider Department Center   2/5/2025  3:00 PM David Vega MD [facility]     Test Results Pending at Discharge:    Pending Results       None                 Raudel Sosa DO  02/03/25  17:08 EST    Time: I spent >30 minutes on this discharge activity which included: face-to-face encounter with the patient, reviewing the data in the system, coordination of the care with the nursing staff as well as consultants, documentation, and entering orders.     Dictated utilizing Dragon dictation.

## 2025-02-03 NOTE — CASE MANAGEMENT/SOCIAL WORK
Case Management Discharge Note      Final Note: DC home with family. Denies any needs.    Provided Post Acute Provider List?: N/A  N/A Provider List Comment: Patient to return home; no new needs  Provided Post Acute Provider Quality & Resource List?: N/A  N/A Quality & Resource List Comment: Patient to return home; no new needs    Selected Continued Care - Discharged on 2/3/2025 Admission date: 1/30/2025 - Discharge disposition: Home or Self Care      Destination    No services have been selected for the patient.                Durable Medical Equipment    No services have been selected for the patient.                Dialysis/Infusion    No services have been selected for the patient.                Home Medical Care    No services have been selected for the patient.                Therapy    No services have been selected for the patient.                Community Resources    No services have been selected for the patient.                Community & DME    No services have been selected for the patient.                    Transportation Services  Private: Car    Final Discharge Disposition Code: 01 - home or self-care

## 2025-02-03 NOTE — PROGRESS NOTES
LOS: 3 days   Patient Care Team:  Taniya Moise DO as PCP - General (Family Medicine)      Chief Complaint: patient feels better, passing flatus and moving bowels      Interval History:     Patient Complaints: See Above, passing flatus, passing stool    History taken from: patient RN    Vital Signs  Temp:  [98.1 °F (36.7 °C)-99.3 °F (37.4 °C)] 98.5 °F (36.9 °C)  Heart Rate:  [63-71] 67  Resp:  [16-18] 16  BP: (108-120)/(60-72) 118/64    Physical Exam:     General Appearance:    Alert, cooperative, in no acute distress   Head:    Normocephalic, without obvious abnormality, atraumatic   Lungs:     Clear to auscultation,respirations regular, even and                  unlabored    Heart:    Regular rhythm and normal rate, normal S1 and S2, no            murmur, no gallop, no rub, no click   Abdomen:     Normal bowel sounds, no masses, no organomegaly, soft        non-tender, non-distended, no guarding, no rebound                tenderness   Extremities:   Moves all extremities well, no edema, no cyanosis, no             redness   Pulses:   Pulses palpable and equal bilaterally   Skin:   No bleeding, bruising or rash        Results Review:       Lab Results (last 24 hours)       Procedure Component Value Units Date/Time    Comprehensive Metabolic Panel [[id number]] Collected: 02/03/25 0508    Specimen: Blood Updated: 02/03/25 0638     Glucose 97 mg/dL      BUN 8 mg/dL      Creatinine 0.98 mg/dL      Sodium 140 mmol/L      Potassium 3.8 mmol/L      Chloride 103 mmol/L      CO2 26.6 mmol/L      Calcium 8.6 mg/dL      Total Protein 6.2 g/dL      Albumin 3.8 g/dL      ALT (SGPT) 27 U/L      AST (SGOT) 25 U/L      Alkaline Phosphatase 65 U/L      Total Bilirubin 0.6 mg/dL      Globulin 2.4 gm/dL      A/G Ratio 1.6 g/dL      BUN/Creatinine Ratio 8.2     Anion Gap 10.4 mmol/L      eGFR 91.1 mL/min/1.73     Narrative:      GFR Categories in Chronic Kidney Disease (CKD)      GFR Category          GFR (mL/min/1.73)     Interpretation  G1                     90 or greater         Normal or high (1)  G2                      60-89                Mild decrease (1)  G3a                   45-59                Mild to moderate decrease  G3b                   30-44                Moderate to severe decrease  G4                    15-29                Severe decrease  G5                    14 or less           Kidney failure          (1)In the absence of evidence of kidney disease, neither GFR category G1 or G2 fulfill the criteria for CKD.    eGFR calculation 2021 CKD-EPI creatinine equation, which does not include race as a factor    CBC & Differential [[id number]]  (Abnormal) Collected: 02/03/25 0508    Specimen: Blood Updated: 02/03/25 0620    Narrative:      The following orders were created for panel order CBC & Differential.  Procedure                               Abnormality         Status                     ---------                               -----------         ------                     CBC Auto Differential[[id number]]        Abnormal            Final result                 Please view results for these tests on the individual orders.    CBC Auto Differential [[id number]]  (Abnormal) Collected: 02/03/25 0508    Specimen: Blood Updated: 02/03/25 0620     WBC 9.46 10*3/mm3      RBC 4.75 10*6/mm3      Hemoglobin 14.8 g/dL      Hematocrit 43.2 %      MCV 90.9 fL      MCH 31.2 pg      MCHC 34.3 g/dL      RDW 12.3 %      RDW-SD 40.8 fl      MPV 10.6 fL      Platelets 201 10*3/mm3      Neutrophil % 73.1 %      Lymphocyte % 15.1 %      Monocyte % 9.1 %      Eosinophil % 2.3 %      Basophil % 0.2 %      Immature Grans % 0.2 %      Neutrophils, Absolute 6.91 10*3/mm3      Lymphocytes, Absolute 1.43 10*3/mm3      Monocytes, Absolute 0.86 10*3/mm3      Eosinophils, Absolute 0.22 10*3/mm3      Basophils, Absolute 0.02 10*3/mm3      Immature Grans, Absolute 0.02 10*3/mm3      nRBC 0.0 /100 WBC                 Assessment & Plan       SBO  (small bowel obstruction)    Bowel obstruction      Stable 54-year-old white male has had an ileus of unknown etiology and may be related to his GLP inhibitor.  He is passing flatus and moving his bowels, he is tolerating a liquid diet, I think he can go home today and I want him to stay on a liquid diet.  I want to see him back in my office this coming Wednesday he may need future further workup including possible small bowel series versus endoscopy.      David Vega MD  02/03/25  08:11 EST

## 2025-02-03 NOTE — OUTREACH NOTE
Prep Survey      Flowsheet Row Responses   [facility] facility patient discharged from? [facility]   Is LACE score < 7 ? No   Eligibility Readm Mgmt   Discharge diagnosis SBO (small bowel obstruction)   Does the patient have one of the following disease processes/diagnoses(primary or secondary)? Other   Does the patient have Home health ordered? No   Is there a DME ordered? No   Prep survey completed? Yes            Cha LUNDBERG - Registered Nurse

## 2025-02-04 LAB
BACTERIA SPEC AEROBE CULT: NORMAL
BACTERIA SPEC AEROBE CULT: NORMAL

## 2025-02-04 NOTE — PAYOR COMM NOTE
"To:  Monica  From: Saima Morin RN  Phone: [phone]  Fax: [phone]  NPI: [id number]  TIN: [id number]  Member ID: [id number]   MRN: [id number]    Short, Dwayne Denver (54 y.o. Male)       Date of Birth   1970    Social Security Number       Address   [address] [facility] [address]    Home Phone   [phone]    MRN   [id number]       Jew   Faith    Marital Status   Single                            Admission Date   1/30/25    Admission Type   Emergency    Admitting Provider   Abelardo Tomas DO    Attending Provider       Department, Room/Bed   [facility] TELEMETRY 4, 430/1       Discharge Date   2/3/2025    Discharge Disposition   Home or Self Care    Discharge Destination                                 Attending Provider: (none)   Allergies: Codeine    Isolation: None   Infection: None   Code Status: Prior    Ht: 185.4 cm (73\")   Wt: 119 kg (261 lb 11 oz)    Admission Cmt: None   Principal Problem: SBO (small bowel obstruction) [K56.609]                   Active Insurance as of 1/30/2025       Primary Coverage       Payor Plan Insurance Group Employer/Plan Group    [employer] PPO [id number]       Payor Plan Address Payor Plan Phone Number Payor Plan Fax Number Effective Dates    [address] [phone]  1/1/2025 - None Entered    [place] [address]         Subscriber Name Subscriber Birth Date Member ID       SHORT,DWAYNE DENVER 1970 [id number]                     Emergency Contacts        (Rel.) Home Phone Work Phone Mobile Phone    Petrona Neal (Mother) [phone] -- --              Discharge Summary    No notes of this type exist for this encounter.           Raudel Sosa DO   Physician  Hospitalist     Discharge Summary     Incomplete     Date of Service: 02/03/25 1029  Creation Time: 02/03/25 1708     Incomplete              [facility] HOSPITALIST   DISCHARGE SUMMARY        Name: "  Dwayne Denver Short             Age:  54 y.o.  Sex:  male  :  1970  MRN:  [id number]   Visit Number:  [id number]     Admission Date:  2025  Date of Discharge:  2/3/2025  Primary Care Physician:  Taniya Moise DO     Important issues to note:          Discharge Diagnoses:           Problem List:            Active Hospital Problems     Diagnosis   POA    **SBO (small bowel obstruction) [K56.609]   Yes    Bowel obstruction [K56.609]   Yes       Resolved Hospital Problems   No resolved problems to display.      Presenting Problem:         Chief Complaint   Patient presents with    Abdominal Pain                 Consults:      Consulting Physician(s)           Provider   Role Specialty      Emmanuel Waite MD       Consulting Physician Internal Medicine             Procedures Performed:           History of presenting illness/Hospital Course:          Vital Signs:     Temp:  [98.1 °F (36.7 °C)-99.3 °F (37.4 °C)] 98.5 °F (36.9 °C)  Heart Rate:  [63-67] 67  Resp:  [16-18] 16  BP: (108-118)/(60-64) 118/64     Physical Exam:     General Appearance:  Alert and cooperative.    Head:  Atraumatic and normocephalic.   Eyes: Conjunctivae and sclerae normal, no icterus. No pallor.   Ears:  Ears with no abnormalities noted.   Throat: No oral lesions, no thrush, oral mucosa moist.   Neck: Supple, trachea midline, no thyromegaly.   Back:   No kyphoscoliosis present. No tenderness to palpation.   Lungs:   Breath sounds heard bilaterally equally.  No crackles or wheezing. No Pleural rub or bronchial breathing.   Heart:  Normal S1 and S2, no murmur, no gallop, no rub. No JVD.   Abdomen:   Normal bowel sounds, no masses, no organomegaly. Soft, nontender, nondistended, no rebound tenderness.   Extremities: Supple, no edema, no cyanosis, no clubbing.   Pulses: Pulses palpable bilaterally.   Skin: No bleeding or rash.   Neurologic: Alert and oriented x 3. No facial asymmetry. Moves all four limbs. No tremors.       Pertinent Lab Results:              Results from last 7 days   Lab Units 02/03/25  0508 02/02/25  0556 02/01/25  0547 01/31/25  0707   SODIUM mmol/L 140 140 138 141   POTASSIUM mmol/L 3.8 4.0 3.9 4.4   CHLORIDE mmol/L 103 103 103 105   CO2 mmol/L 26.6 25.7 26.8 28.0   BUN mg/dL 8 8 6 5*   CREATININE mg/dL 0.98 1.00 1.02 1.12   CALCIUM mg/dL 8.6 8.7 8.4* 8.5*   BILIRUBIN mg/dL 0.6 0.7  --  0.5   ALK PHOS U/L 65 62  --  61   ALT (SGPT) U/L 27 24  --  27   AST (SGOT) U/L 25 19  --  16   GLUCOSE mg/dL 97 112* 118* 137*             Results from last 7 days   Lab Units 02/03/25  0508 02/02/25  0556 02/01/25  0547   WBC 10*3/mm3 9.46 12.63* 8.87   HEMOGLOBIN g/dL 14.8 14.9 14.8   HEMATOCRIT % 43.2 44.9 44.2   PLATELETS 10*3/mm3 201 206 202                Results from last 7 days   Lab Units 01/30/25  0046 01/29/25  2348   HSTROP T ng/L 7 7                   Results from last 7 days   Lab Units 01/30/25  2223   LIPASE U/L 17                Results from last 7 days   Lab Units 01/30/25  0048 01/30/25  0046   BLOODCX   No growth at 4 days No growth at 4 days         Pertinent Radiology Results:     Imaging Results (All)         Procedure Component Value Units Date/Time     XR Abdomen KUB [[id number]] Collected: 02/02/25 0851       Updated: 02/02/25 0855     Narrative:       PROCEDURE: XR ABDOMEN KUB-     HISTORY: Bowel obstruction; K56.609-Unspecified intestinal obstruction,  unspecified as to partial versus complete obstruction     COMPARISON: February 1, 2025.     FINDINGS: An AP view of the abdomen and pelvis demonstrates NG tube  beneath the diaphragm in the stomach. Again are seen multiple dilated  loops of small bowel, not significantly changed from prior, correlate  for obstruction versus ileus. No acute osseous changes. No radiopaque  stones are seen overlying the renal shadows.        Impression:       No significant change from prior. Continued follow-up  recommended, consider small bowel follow-through.                  This report was signed and finalized on 2/2/2025 8:53 AM by Bernardo Houser DO.        XR Abdomen KUB [[id number]] Collected: 02/01/25 0744       Updated: 02/01/25 0750     Narrative:       PROCEDURE: XR ABDOMEN KUB-     HISTORY: History of possible bowel obstruction; K56.609-Unspecified  intestinal obstruction, unspecified as to partial versus complete  obstruction     COMPARISON: January 31, 2025.     FINDINGS: An AP view of the abdomen and pelvis demonstrates NG tube  beneath the diaphragm. There are multiple prominent loops of small bowel  similar to the previous exam, correlate for ileus versus small bowel  obstruction. The colon appears fairly decompressed. There are no acute  osseous changes.            Impression:       Multiple dilated loops of small bowel worrisome for small  bowel obstruction versus ileus. Consider small bowel follow-through.                 This report was signed and finalized on 2/1/2025 7:47 AM by Bernardo Houser DO.        CT Abdomen Pelvis Without Contrast [[id number]] Collected: 01/31/25 0909       Updated: 01/31/25 0921     Narrative:       PROCEDURE: CT ABDOMEN PELVIS WO CONTRAST-     HISTORY: sbo; K56.609-Unspecified intestinal obstruction, unspecified as  to partial versus complete obstruction     COMPARISON: 8 hours prior.     PROCEDURE: Axial images were obtained from the lung bases to the pubic  symphysis by computed tomography. This study was performed with  techniques to keep radiation doses as low as reasonably achievable,  (ALARA). Individualized dose reduction techniques using automated  exposure control or adjustment of mA and/or kV according to the patient  size were employed.     FINDINGS:     ABDOMEN: Since the prior exam NG tube has been placed with the tip in  the body of the stomach. Oral contrast was administered. There is a  small amount remaining in the stomach. Oral contrast has progressed to  mildly dilated jejunal loops in the left side of the abdomen. Which  are  stable to slightly improved compared to prior. Remainder of the small  bowel and colon appear decompressed. Bilateral linear lower lobe  densities noted right greater than left, likely atelectasis. Gallbladder  present and contains dependent material of increased attenuation new  from prior, likely vicarious excretion of the previously administered  intravenous contrast. The heart is proper size. The limited non-contrast  images of the liver are unremarkable. The spleen is unremarkable. No  adrenal masses are seen. The aorta is normal in caliber. There is no  significant free fluid or adenopathy.  There is no nephrolithiasis.  There is no hydronephrosis. Small amount of contrast is identified in  the renal collecting systems bilaterally.     PELVIS: The GI tract demonstrate no obstruction. The appendix is  identified and appears normal. The urinary bladder is partially filled  and contains contrast likely from prior study. This is not identified on  the prior exam. Prostate is normal in size and contains calcifications,  stable from prior. There is diverticulosis without evidence of  diverticulitis.. No free air identified. There is no fluid or  adenopathy.         Impression:       Interval placement of NG tube and administration of oral  contrast with progression of contrast into the jejunum in the left side  of the abdomen; mildly dilated jejunal loops noted with the remainder of  the bowel decompressed in this patient who received a recent injection  of Mounjaro, suspect decreased motility secondary to medication.  Follow-up may be helpful.     Mild dilatation of the jejunum, similar to slightly improved compared to  prior.        CTDI: 12.85 mGy  DLP:737.63 mGy.cm     This report was signed and finalized on 1/31/2025 9:19 AM by Lazara Rodriguez MD.        XR Abdomen 1 View [[id number]] Collected: 01/31/25 0528       Updated: 01/31/25 0529     Narrative:       FINAL REPORT     TECHNIQUE:  null     CLINICAL  HISTORY:  ng placement     COMPARISON:  null     FINDINGS:  Abdomen 1 view     Comparison: 01/30/2025.     Findings:     Supine view of portion of abdomen submitted. Pelvis not included. Nasogastric tube tip projects over left upper quadrant of abdomen in the region of the stomach. Distended small bowel increased.        Impression:       Impression:     1. NG tube tip projects over stomach.     2. Increased small bowel distention.     Authenticated and Electronically Signed by Emerson Escobedo MD on  01/31/2025 05:28:10 AM     CT Abdomen Pelvis With Contrast [[id number]] Collected: 01/31/25 0113       Updated: 01/31/25 0118     Addenda:             ADDENDUM REPORT    ADDENDUM:  This report was discussed with Edmundo Tinajero MD on Jan 31, 2025 01:15:00   EST.    Authenticated and Electronically Signed by Johnnie Stevens MD on  01/31/2025 01:16:37 AM  Signed: 01/31/25 0116 by Johnnie Stevens MD     Narrative:       FINAL REPORT     TECHNIQUE:  null     CLINICAL HISTORY:  Mid-epigastric abd pain, abd distention, possible SBO on CT from  last night     COMPARISON:  null     FINDINGS:  CT abdomen and pelvis with contrast     Comparison: CR - XR ABDOMEN 1 VW - 1/30/25 01:34 EST     CT/SR - CT ABDOMEN PELVIS W CONTRAST - 1/30/25 00:07 EST     Findings:     There is increased bibasilar atelectasis.     There is mild hepatic steatosis. New faint hyperdensity layering within the gallbladder is consistent with vicarious excretion of contrast. Gallbladder is otherwise unremarkable. The pancreas, adrenal glands, and kidneys are unremarkable. There are   calcified splenic granulomas.     NG tube is been removed. There is slightly increased distention of small bowel in the mid and lower abdomen. There is no completely decompressed small bowel in the right lower quadrant with more abrupt transition consistent with small-bowel obstruction.   There is mild mesenteric edema adjacent to dilated small bowel loops. There is mild colonic  diverticulosis.     The aorta is normal in diameter. IVC is widely patent. There are no enlarged lymph nodes. There is a small fat containing periumbilical hernia.     There is no fracture or suspicious lytic or sclerotic lesion.        Impression:       IMPRESSION:     1. Findings consistent with small-bowel obstruction with transition point in the right lower quadrant and slightly increased distention of small bowel.     2. Chronic findings as above.     Authenticated and Electronically Signed by Johnnie Stevens MD on  01/31/2025 01:13:37 AM                Echo:        Condition on Discharge:       Stable.     Code status during the hospital stay:         Code Status and Medical Interventions: CPR (Attempt to Resuscitate); Full Support   Ordered at: 01/31/25 0520     Code Status (Patient has no pulse and is not breathing):     CPR (Attempt to Resuscitate)     Medical Interventions (Patient has pulse or is breathing):     Full Support      Discharge Disposition:     Home or Self Care     Discharge Medications:         Discharge Medications          Continue These Medications         Instructions Start Date   ondansetron ODT 4 MG disintegrating tablet  Commonly known as: ZOFRAN-ODT    4 mg, Translingual, 4 Times Daily PRN                  Discharge Diet:      Diet Instructions         Diet: Liquid Diets; Full Liquid; Regular (IDDSI 7); Thin (IDDSI 0)       Discharge Diet: Liquid Diets     Liquid Diet: Full Liquid     Texture: Regular (IDDSI 7)     Fluid Consistency: Thin (IDDSI 0)             Activity at Discharge:      Activity Instructions         Activity as Tolerated               Follow-up Appointments:     Additional Instructions for the Follow-ups that You Need to Schedule         Discharge Follow-up with PCP   As directed         Currently Documented PCP:    Taniya Moise DO    PCP Phone Number:    [phone]      Follow Up Details: 1 week           Discharge Follow-up with Specified Provider:   Gary -  02/05/25; 2 Days   As directed        To: Dr Vega -  02/05/25   Follow Up: 2 Days                     Follow-up Information         Taniya Moise DO. Go on 2/7/2025.    Specialty: Family Medicine  Why: @ 10:00am  Contact information:  [address] [facility] [address]  [phone]                    Taniya Moise DO .    Specialty: Family Medicine  Contact information:  [address] [facility] [address]  [phone]                                          Future Appointments   Date Time Provider Department Center   2/5/2025  3:00 PM David Vega MD [facility]      Test Results Pending at Discharge:     Pending Results         None                      Raudel Sosa DO  02/03/25  17:08 EST     Time: I spent >30 minutes on this discharge activity which included: face-to-face encounter with the patient, reviewing the data in the system, coordination of the care with the nursing staff as well as consultants, documentation, and entering orders.      Dictated utilizing Dragon dictation.

## 2025-02-05 ENCOUNTER — OFFICE VISIT (OUTPATIENT)
Dept: SURGERY | Facility: CLINIC | Age: 55
End: 2025-02-05
Payer: COMMERCIAL

## 2025-02-05 VITALS
SYSTOLIC BLOOD PRESSURE: 110 MMHG | BODY MASS INDEX: 34.46 KG/M2 | WEIGHT: 260 LBS | HEART RATE: 132 BPM | OXYGEN SATURATION: 97 % | TEMPERATURE: 98.2 F | HEIGHT: 73 IN | DIASTOLIC BLOOD PRESSURE: 68 MMHG

## 2025-02-05 DIAGNOSIS — K56.609 SBO (SMALL BOWEL OBSTRUCTION): Primary | ICD-10-CM

## 2025-02-05 PROCEDURE — 99213 OFFICE O/P EST LOW 20 MIN: CPT | Performed by: SURGERY

## 2025-02-05 RX ORDER — TIRZEPATIDE 2.5 MG/.5ML
INJECTION, SOLUTION SUBCUTANEOUS
COMMUNITY
Start: 2024-11-18

## 2025-02-05 RX ORDER — LACTOBACILLUS RHAMNOSUS GG 10B CELL
CAPSULE ORAL EVERY 24 HOURS
COMMUNITY

## 2025-02-05 RX ORDER — GABAPENTIN 100 MG/1
3 CAPSULE ORAL 2 TIMES DAILY
COMMUNITY

## 2025-02-05 RX ORDER — POLYETHYLENE GLYCOL 3350
POWDER (GRAM) MISCELLANEOUS
COMMUNITY

## 2025-02-05 RX ORDER — LIDOCAINE 50 MG/G
PATCH TOPICAL
COMMUNITY
Start: 2024-05-07

## 2025-02-05 RX ORDER — MECLIZINE HYDROCHLORIDE 25 MG/1
1 TABLET ORAL 3 TIMES DAILY
COMMUNITY

## 2025-02-05 RX ORDER — ESZOPICLONE 3 MG/1
1 TABLET, FILM COATED ORAL
COMMUNITY

## 2025-02-05 RX ORDER — LORAZEPAM 0.5 MG/1
TABLET ORAL
COMMUNITY

## 2025-02-05 RX ORDER — DICLOFENAC SODIUM 75 MG/1
TABLET, DELAYED RELEASE ORAL
COMMUNITY

## 2025-02-07 ENCOUNTER — READMISSION MANAGEMENT (OUTPATIENT)
Dept: CALL CENTER | Facility: HOSPITAL | Age: 55
End: 2025-02-07
Payer: COMMERCIAL

## 2025-02-07 NOTE — OUTREACH NOTE
Medical Week 1 Survey      Flowsheet Row Responses   [facility] patient discharged from? [facility]   Does the patient have one of the following disease processes/diagnoses(primary or secondary)? Other   Week 1 attempt successful? Yes   Call start time 1053   Call end time 1055   Discharge diagnosis SBO (small bowel obstruction)   Meds reviewed with patient/caregiver? Yes   Is the patient having any side effects they believe may be caused by any medication additions or changes? No   Does the patient have all medications ordered at discharge? N/A   Is the patient taking all medications as directed (includes completed medication regime)? Yes   Does the patient have a primary care provider?  Yes   Does the patient have an appointment with their PCP within 7 days of discharge? Yes   Has the patient kept scheduled appointments due by today? Yes   Has home health visited the patient within 72 hours of discharge? N/A   Psychosocial issues? No   Did the patient receive a copy of their discharge instructions? Yes   Nursing interventions Reviewed instructions with patient   What is the patient's perception of their health status since discharge? Improving   Is the patient/caregiver able to teach back signs and symptoms related to disease process for when to call PCP? Yes   Is the patient/caregiver able to teach back signs and symptoms related to disease process for when to call 911? Yes   Is the patient/caregiver able to teach back the hierarchy of who to call/visit for symptoms/problems? PCP, Specialist, Home health nurse, Urgent Care, ED, 911 Yes   If the patient is a current smoker, are they able to teach back resources for cessation? Not a smoker   Week 1 call completed? Yes   Would this patient benefit from a Referral to Amb Social Work? No   Is the patient interested in additional calls from an ambulatory ? No   Wrap up additional comments Pt. reports had hospital f/u with PCP this am. He reports had  Post Op appt. He has graduated from full liquid diet to soft diet, activity as tolerated. Advised any worsening symtpoms go to ED, any questions contact surgeon or PCP, v/u.  No c/v at this time.   Call end time [phone]            Tsering CORTES - Registered Nurse

## 2025-02-17 NOTE — PROGRESS NOTES
Patient: Dwayne Denver Short    YOB: 1970    Date: 02/19/2025    Primary Care Provider: Taniya Moise DO    Chief Complaint   Patient presents with    Follow-up       SUBJECTIVE:    History of present illness:  Patient was recently admitted to [facility] secondary to small bowel obstruction, he is here for follow-up.    He states he does feel better although he does have some aspects of lower quadrant abdominal discomfort occasionally.    The following portions of the patient's history were reviewed and updated as appropriate: allergies, current medications, past family history, past medical history, past social history, past surgical history and problem list.      Review of Systems   Constitutional:  Negative for chills, fever and unexpected weight change.   HENT:  Negative for trouble swallowing and voice change.    Eyes:  Negative for visual disturbance.   Respiratory:  Negative for apnea, cough, chest tightness, shortness of breath and wheezing.    Cardiovascular:  Negative for chest pain, palpitations and leg swelling.   Gastrointestinal:  Negative for abdominal distention, abdominal pain, anal bleeding, blood in stool, constipation, diarrhea, nausea, rectal pain and vomiting.   Endocrine: Negative for cold intolerance and heat intolerance.   Genitourinary:  Negative for difficulty urinating, dysuria, flank pain, scrotal swelling and testicular pain.   Musculoskeletal:  Negative for back pain, gait problem and joint swelling.   Skin:  Negative for color change, rash and wound.   Neurological:  Negative for dizziness, syncope, speech difficulty, weakness, numbness and headaches.   Hematological:  Negative for adenopathy. Does not bruise/bleed easily.   Psychiatric/Behavioral:  Negative for confusion. The patient is not nervous/anxious.        Allergies:  Allergies   Allergen Reactions    Codeine Other (See Comments)     headache       Medications:    Current  "Outpatient Medications:     eszopiclone (LUNESTA) 3 MG tablet, Take 1 tablet by mouth every night at bedtime., Disp: , Rfl:     gabapentin (NEURONTIN) 100 MG capsule, Take 3 capsules by mouth 2 (Two) Times a Day., Disp: , Rfl:     LORazepam (ATIVAN) 0.5 MG tablet, TAKE 1 TABLET BY MOUTH ONCE DAILY AS NEEDED FOR 30 DAYS, Disp: , Rfl:     probiotic (CULTURELLE) capsule capsule, Daily., Disp: , Rfl:     diclofenac (VOLTAREN) 75 MG EC tablet, Take 1 tablet twice a day by oral route for 30 days. (Patient not taking: Reported on 2/19/2025), Disp: , Rfl:     lidocaine (LIDODERM) 5 %, Apply by topical route for 90 days. (Patient not taking: Reported on 2/19/2025), Disp: , Rfl:     meclizine (ANTIVERT) 25 MG tablet, Take 1 tablet by mouth 3 (Three) Times a Day. (Patient not taking: Reported on 2/19/2025), Disp: , Rfl:     Mounjaro 2.5 MG/0.5ML solution auto-injector, Inject by subcutaneous route for 84 days. (Patient not taking: Reported on 2/19/2025), Disp: , Rfl:     ondansetron ODT (ZOFRAN-ODT) 4 MG disintegrating tablet, Place 1 tablet on the tongue 4 (Four) Times a Day As Needed for Nausea. (Patient not taking: Reported on 2/19/2025), Disp: 20 tablet, Rfl: 0    Polyethylene Glycol 3350 powder, polyethylene glycol 3350 17 gram/dose oral powder  MIX 238GM WITH 64OZ OF CLEAR LIQUID AS DIRECTED (Patient not taking: Reported on 2/19/2025), Disp: , Rfl:     History:  Past Medical History:   Diagnosis Date    Diverticulitis     Myocardial infarction        History reviewed. No pertinent surgical history.    History reviewed. No pertinent family history.    Social History     Tobacco Use    Smoking status: Never    Smokeless tobacco: Never   Vaping Use    Vaping status: Never Used   Substance Use Topics    Alcohol use: Yes     Comment: occasionally    Drug use: No        OBJECTIVE:    Vital Signs:   Vitals:    02/19/25 1406   Pulse: 70   Temp: 98 °F (36.7 °C)   SpO2: 96%   Weight: 118 kg (260 lb)   Height: 185.4 cm (72.99\") "       Physical Exam:       Physical Exam:     General : no acute distress  Chest : clear bilaterally  COR : regular rate and rhythm  ABD :  soft nontender, all incisions healed nicely    Results Review:   I reviewed the patient's new clinical results.  I reviewed the patient's new imaging results and agree with the interpretation.  I reviewed the patient's other test results and agree with the interpretation    Review of Systems was reviewed and confirmed as accurate as documented by the MA.    ASSESSMENT/PLAN:    1. SBO (small bowel obstruction)        I think the patient needs to get a upper GI and small bowel series and then I would like to see him back in the office in follow-up, he did have a colonoscopy 1-2 years ago, he may not need any further intervention.  This may have been all related to his recent injection of GLP inhibitor.    I discussed the patients findings and my recommendations with patient        Electronically signed by David Vega MD  02/20/25

## 2025-02-19 ENCOUNTER — OFFICE VISIT (OUTPATIENT)
Dept: SURGERY | Facility: CLINIC | Age: 55
End: 2025-02-19
Payer: COMMERCIAL

## 2025-02-19 VITALS
HEIGHT: 73 IN | HEART RATE: 70 BPM | TEMPERATURE: 98 F | WEIGHT: 260 LBS | OXYGEN SATURATION: 96 % | BODY MASS INDEX: 34.46 KG/M2

## 2025-02-19 DIAGNOSIS — K56.609 SBO (SMALL BOWEL OBSTRUCTION): Primary | ICD-10-CM

## 2025-02-19 PROCEDURE — 99212 OFFICE O/P EST SF 10 MIN: CPT | Performed by: SURGERY

## 2025-03-05 NOTE — PROGRESS NOTES
Patient: Dwayne Denver Short    YOB: 1970    Date: 03/10/2025    Primary Care Provider: Taniya Moise DO    Chief Complaint   Patient presents with    Follow-up     small bowel obstruction       SUBJECTIVE:    History of present illness:  Patient is here for follow-up small bowel obstruction.  Patient had upper GI performed 03/06/2025 which showed mild esophageal dysmotility, moderate gastroesophageal reflux to the level of the thoracic inlet, a small hiatal sliding-type hiatal hernia and duodenal diverticulum.    He denies a history significant for reflux but his upper GI was fairly significant for reflux up to the thoracic inlet.    The following portions of the patient's history were reviewed and updated as appropriate: allergies, current medications, past family history, past medical history, past social history, past surgical history and problem list.        Review of Systems:  Constitutional:  Negative for chills, fever, and unexpected weight change.  HENT: Negative for trouble swallowing and voice change.  Eyes:  Negative for visual disturbance.  Respiratory:  Negative for apnea, cough, chest tightness, shortness of breath, and wheezing.  Cardiovascular:  Negative for chest pain, palpitations, and leg swelling.  Gastrointestinal:  Negative for abdominal distention, abdominal pain, anal bleeding, blood in stool, constipation, diarrhea, nausea, rectal pain, and vomiting.  Musculoskeletal:  Negative for back pain, gait problem, and joint swelling.  Skin:  Negative for color change, rash, and wound  Neurological:  Negative for dizziness, syncope, speech difficulty, weakness, numbness, and headaches.  Hematological:  Negative for adenopathy.  Does not bruise/bleed easily.  Psychiatric/Behavioral:  Negative for confusion.  The patient is not nervous/anxious.          Allergies:  Allergies   Allergen Reactions    Codeine Other (See Comments)     headache       Medications:    Current  "Outpatient Medications:     eszopiclone (LUNESTA) 3 MG tablet, Take 1 tablet by mouth every night at bedtime., Disp: , Rfl:     gabapentin (NEURONTIN) 100 MG capsule, Take 3 capsules by mouth 2 (Two) Times a Day., Disp: , Rfl:     diclofenac (VOLTAREN) 75 MG EC tablet, , Disp: , Rfl:     lidocaine (LIDODERM) 5 %, , Disp: , Rfl:     LORazepam (ATIVAN) 0.5 MG tablet, TAKE 1 TABLET BY MOUTH ONCE DAILY AS NEEDED FOR 30 DAYS (Patient not taking: Reported on 3/10/2025), Disp: , Rfl:     meclizine (ANTIVERT) 25 MG tablet, Take 1 tablet by mouth 3 (Three) Times a Day. (Patient not taking: Reported on 3/10/2025), Disp: , Rfl:     Mounjaro 2.5 MG/0.5ML solution auto-injector, , Disp: , Rfl:     ondansetron ODT (ZOFRAN-ODT) 4 MG disintegrating tablet, Place 1 tablet on the tongue 4 (Four) Times a Day As Needed for Nausea. (Patient not taking: Reported on 3/10/2025), Disp: 20 tablet, Rfl: 0    Polyethylene Glycol 3350 powder, , Disp: , Rfl:     probiotic (CULTURELLE) capsule capsule, Daily. (Patient not taking: Reported on 3/10/2025), Disp: , Rfl:     History:  Past Medical History:   Diagnosis Date    Diverticulitis     Myocardial infarction        History reviewed. No pertinent surgical history.    History reviewed. No pertinent family history.    Social History     Tobacco Use    Smoking status: Never    Smokeless tobacco: Never   Vaping Use    Vaping status: Never Used   Substance Use Topics    Alcohol use: Yes     Comment: occasionally    Drug use: No        OBJECTIVE:    Vital Signs:   Vitals:    03/10/25 1034   BP: 138/82   Pulse: 98   Resp: 18   Temp: 98.4 °F (36.9 °C)   TempSrc: Temporal   SpO2: 94%   Weight: 116 kg (255 lb 9.6 oz)   Height: 185.4 cm (73\")     Physical Exam:     General Appearance:    Alert, cooperative, in no acute distress   Head:    Normocephalic, without obvious abnormality, atraumatic   Eyes:            Lids and lashes normal, conjunctivae and sclerae normal, no   icterus, no pallor, corneas " clear, PERRLA   Ears:    Ears appear intact with no abnormalities noted   Throat:   No oral lesions, no thrush, oral mucosa moist   Neck:   No adenopathy, supple, trachea midline, no thyromegaly, no   carotid bruit, no JVD   Back:     No kyphosis present, no scoliosis present, no skin lesions,      erythema or scars, no tenderness to percussion or                   palpation,   range of motion normal   Lungs:     Clear to auscultation,respirations regular, even and                  unlabored    Heart:    Regular rhythm and normal rate, normal S1 and S2, no            murmur, no gallop, no rub, no click   Chest Wall:    No abnormalities observed   Abdomen:     Normal bowel sounds, no masses, no organomegaly, soft        non-tender, non-distended, no guarding,    Extremities:   Moves all extremities well, no edema, no cyanosis, no             redness   Pulses:   Pulses palpable and equal bilaterally   Skin:   No bleeding, bruising or rash   Lymph nodes:   No palpable adenopathy   Neurologic:   Cranial nerves 2 - 12 grossly intact, sensation intact, DTR       present and equal bilaterally         Results Review:   I reviewed the patient's new clinical results.  I reviewed the patient's new imaging results and agree with the interpretation.  I reviewed the patient's other test results and agree with the interpretation    Review of Systems was reviewed and confirmed as accurate as documented by the MA.    ASSESSMENT/PLAN:    1. Gastroesophageal reflux disease with esophagitis without hemorrhage        I did have a detailed and extensive discussion with the patient in the office today.  The full risks and benefits of operative versus nonoperative intervention were discussed with the paient, they understand, agree, and wish to proceed with the surgical treatment plan of EGD.    Given the amount of reflux the patient has on barium swallow he would benefit from upper endoscopy.    I discussed the patients findings and my  recommendations with patient        Electronically signed by David Vega MD  03/11/25

## 2025-03-06 ENCOUNTER — HOSPITAL ENCOUNTER (OUTPATIENT)
Dept: GENERAL RADIOLOGY | Facility: HOSPITAL | Age: 55
Discharge: HOME | End: 2025-03-06
Admitting: SURGERY
Payer: COMMERCIAL

## 2025-03-06 DIAGNOSIS — K56.609 SBO (SMALL BOWEL OBSTRUCTION): ICD-10-CM

## 2025-03-06 PROCEDURE — 74246 X-RAY XM UPR GI TRC 2CNTRST: CPT

## 2025-03-06 PROCEDURE — 74248 X-RAY SM INT F-THRU STD: CPT

## 2025-03-06 RX ADMIN — BARIUM SULFATE 336 ML: 960 POWDER, FOR SUSPENSION ORAL at 10:02

## 2025-03-10 ENCOUNTER — PREP FOR SURGERY (OUTPATIENT)
Dept: OTHER | Facility: HOSPITAL | Age: 55
End: 2025-03-10
Payer: COMMERCIAL

## 2025-03-10 ENCOUNTER — OFFICE VISIT (OUTPATIENT)
Dept: SURGERY | Facility: CLINIC | Age: 55
End: 2025-03-10
Payer: COMMERCIAL

## 2025-03-10 VITALS
RESPIRATION RATE: 18 BRPM | WEIGHT: 255.6 LBS | HEIGHT: 73 IN | BODY MASS INDEX: 33.88 KG/M2 | SYSTOLIC BLOOD PRESSURE: 138 MMHG | HEART RATE: 98 BPM | DIASTOLIC BLOOD PRESSURE: 82 MMHG | TEMPERATURE: 98.4 F | OXYGEN SATURATION: 94 %

## 2025-03-10 DIAGNOSIS — K21.00 GASTROESOPHAGEAL REFLUX DISEASE WITH ESOPHAGITIS WITHOUT HEMORRHAGE: Primary | ICD-10-CM

## 2025-03-10 PROCEDURE — 99213 OFFICE O/P EST LOW 20 MIN: CPT | Performed by: SURGERY

## 2025-03-18 ENCOUNTER — OUTSIDE FACILITY SERVICE (OUTPATIENT)
Dept: SURGERY | Facility: CLINIC | Age: 55
End: 2025-03-18
Payer: COMMERCIAL

## 2025-04-17 ENCOUNTER — PATIENT ROUNDING (BHMG ONLY) (OUTPATIENT)
Dept: URGENT CARE | Facility: CLINIC | Age: 55
End: 2025-04-17
Payer: COMMERCIAL

## 2025-05-23 NOTE — SIGNIFICANT NOTE
Dr. Tomas notified patient stated his SpO2 decreases after receiving pain medication.  Dr. Tomas informed patient has received pain medication and requesting order be placed for O2 Therapy - Nasal cannula Titrate 1-6 LPM per SpO2 90-95%.  Waiting for response.   23